# Patient Record
Sex: FEMALE | Race: WHITE | NOT HISPANIC OR LATINO | Employment: FULL TIME | URBAN - METROPOLITAN AREA
[De-identification: names, ages, dates, MRNs, and addresses within clinical notes are randomized per-mention and may not be internally consistent; named-entity substitution may affect disease eponyms.]

---

## 2023-09-24 PROBLEM — M75.102 LEFT ROTATOR CUFF TEAR: Status: ACTIVE | Noted: 2023-09-24

## 2023-09-24 PROBLEM — M54.16 CHRONIC LUMBAR RADICULOPATHY: Status: ACTIVE | Noted: 2023-09-24

## 2023-09-24 PROBLEM — M75.102 ROTATOR CUFF TEAR, LEFT: Status: ACTIVE | Noted: 2023-09-24

## 2023-09-24 PROBLEM — E78.5 HYPERLIPIDEMIA: Status: ACTIVE | Noted: 2023-09-24

## 2023-09-24 PROBLEM — L30.4 INTERTRIGO: Status: ACTIVE | Noted: 2023-09-24

## 2023-09-24 PROBLEM — M96.1 LUMBAR POSTLAMINECTOMY SYNDROME: Status: ACTIVE | Noted: 2023-09-24

## 2023-09-24 PROBLEM — E11.9 DIABETES MELLITUS (MULTI): Status: ACTIVE | Noted: 2023-09-24

## 2023-09-24 PROBLEM — E04.2 NONTOXIC MULTINODULAR GOITER: Status: ACTIVE | Noted: 2023-09-24

## 2023-09-24 RX ORDER — CARVEDILOL 6.25 MG/1
6.25 TABLET ORAL 2 TIMES DAILY
COMMUNITY
End: 2023-12-04

## 2023-09-24 RX ORDER — MELOXICAM 15 MG/1
TABLET ORAL
COMMUNITY
Start: 2021-02-04 | End: 2023-10-24 | Stop reason: WASHOUT

## 2023-09-24 RX ORDER — ROSUVASTATIN CALCIUM 20 MG/1
1 TABLET, COATED ORAL DAILY
COMMUNITY
Start: 2019-08-16 | End: 2024-05-19 | Stop reason: SDUPTHER

## 2023-09-24 RX ORDER — DOCUSATE SODIUM 100 MG/1
CAPSULE, LIQUID FILLED ORAL 2 TIMES DAILY
COMMUNITY
Start: 2021-02-04

## 2023-09-24 RX ORDER — ACETAMINOPHEN 500 MG
500 TABLET ORAL EVERY 8 HOURS
COMMUNITY
Start: 2021-02-04

## 2023-09-24 RX ORDER — NAPROXEN 500 MG/1
500 TABLET ORAL EVERY 12 HOURS
COMMUNITY
Start: 2022-04-13

## 2023-09-24 RX ORDER — NYSTATIN 100000 [USP'U]/G
POWDER TOPICAL
COMMUNITY
Start: 2022-01-26

## 2023-09-24 RX ORDER — SEMAGLUTIDE 1.34 MG/ML
INJECTION, SOLUTION SUBCUTANEOUS
COMMUNITY
End: 2023-10-24 | Stop reason: WASHOUT

## 2023-09-24 RX ORDER — ESCITALOPRAM OXALATE 20 MG/1
TABLET ORAL
COMMUNITY

## 2023-09-24 RX ORDER — BLOOD SUGAR DIAGNOSTIC
STRIP MISCELLANEOUS
COMMUNITY
Start: 2019-05-07 | End: 2023-10-24 | Stop reason: ALTCHOICE

## 2023-10-06 ENCOUNTER — TELEPHONE (OUTPATIENT)
Dept: PRIMARY CARE | Facility: CLINIC | Age: 63
End: 2023-10-06
Payer: COMMERCIAL

## 2023-10-06 DIAGNOSIS — M54.50 ACUTE LOW BACK PAIN, UNSPECIFIED BACK PAIN LATERALITY, UNSPECIFIED WHETHER SCIATICA PRESENT: Primary | ICD-10-CM

## 2023-10-06 RX ORDER — METHYLPREDNISOLONE 4 MG/1
TABLET ORAL
Qty: 21 TABLET | Refills: 0 | Status: SHIPPED | OUTPATIENT
Start: 2023-10-06 | End: 2023-10-24 | Stop reason: WASHOUT

## 2023-10-06 NOTE — TELEPHONE ENCOUNTER
Was given Dosepak last week for back was helping woke up Tuesday and it was out again.  Can she get refill on Dosepak.    [facility] [place]

## 2023-10-11 ENCOUNTER — ANCILLARY PROCEDURE (OUTPATIENT)
Dept: RADIOLOGY | Facility: HOSPITAL | Age: 63
End: 2023-10-11
Payer: COMMERCIAL

## 2023-10-11 DIAGNOSIS — E04.2 NONTOXIC MULTINODULAR GOITER: ICD-10-CM

## 2023-10-11 PROCEDURE — 76536 US EXAM OF HEAD AND NECK: CPT | Performed by: RADIOLOGY

## 2023-10-11 PROCEDURE — 76536 US EXAM OF HEAD AND NECK: CPT

## 2023-10-16 ENCOUNTER — LAB (OUTPATIENT)
Dept: LAB | Facility: LAB | Age: 63
End: 2023-10-16
Payer: COMMERCIAL

## 2023-10-16 DIAGNOSIS — E11.9 TYPE 2 DIABETES MELLITUS WITHOUT COMPLICATIONS (MULTI): Primary | ICD-10-CM

## 2023-10-16 DIAGNOSIS — E04.2 NONTOXIC MULTINODULAR GOITER: ICD-10-CM

## 2023-10-16 LAB
ALBUMIN SERPL BCP-MCNC: 3.8 G/DL (ref 3.4–5)
ALP SERPL-CCNC: 74 U/L (ref 33–136)
ALT SERPL W P-5'-P-CCNC: 18 U/L (ref 7–45)
ANION GAP SERPL CALC-SCNC: 13 MMOL/L (ref 10–20)
AST SERPL W P-5'-P-CCNC: 16 U/L (ref 9–39)
BILIRUB SERPL-MCNC: 0.6 MG/DL (ref 0–1.2)
BUN SERPL-MCNC: 17 MG/DL (ref 6–23)
CALCIUM SERPL-MCNC: 9 MG/DL (ref 8.6–10.3)
CHLORIDE SERPL-SCNC: 104 MMOL/L (ref 98–107)
CHOLEST SERPL-MCNC: 153 MG/DL (ref 0–199)
CHOLESTEROL/HDL RATIO: 4.6
CO2 SERPL-SCNC: 28 MMOL/L (ref 21–32)
CREAT SERPL-MCNC: 0.83 MG/DL (ref 0.5–1.05)
CREAT UR-MCNC: 125.9 MG/DL (ref 20–320)
ERYTHROCYTE [DISTWIDTH] IN BLOOD BY AUTOMATED COUNT: 13.9 % (ref 11.5–14.5)
EST. AVERAGE GLUCOSE BLD GHB EST-MCNC: 143 MG/DL
GFR SERPL CREATININE-BSD FRML MDRD: 79 ML/MIN/1.73M*2
GLUCOSE SERPL-MCNC: 192 MG/DL (ref 74–99)
HBA1C MFR BLD: 6.6 %
HCT VFR BLD AUTO: 44.9 % (ref 36–46)
HDLC SERPL-MCNC: 33.5 MG/DL
HGB BLD-MCNC: 13.5 G/DL (ref 12–16)
LDLC SERPL CALC-MCNC: 96 MG/DL
MCH RBC QN AUTO: 26.7 PG (ref 26–34)
MCHC RBC AUTO-ENTMCNC: 30.1 G/DL (ref 32–36)
MCV RBC AUTO: 89 FL (ref 80–100)
MICROALBUMIN UR-MCNC: <7 MG/L
MICROALBUMIN/CREAT UR: NORMAL MG/G{CREAT}
NON HDL CHOLESTEROL: 120 MG/DL (ref 0–149)
NRBC BLD-RTO: 0 /100 WBCS (ref 0–0)
PLATELET # BLD AUTO: 207 X10*3/UL (ref 150–450)
PMV BLD AUTO: 11.5 FL (ref 7.5–11.5)
POTASSIUM SERPL-SCNC: 4.7 MMOL/L (ref 3.5–5.3)
PROT SERPL-MCNC: 6.6 G/DL (ref 6.4–8.2)
RBC # BLD AUTO: 5.06 X10*6/UL (ref 4–5.2)
SODIUM SERPL-SCNC: 140 MMOL/L (ref 136–145)
TRIGL SERPL-MCNC: 118 MG/DL (ref 0–149)
TSH SERPL-ACNC: 0.99 MIU/L (ref 0.44–3.98)
VLDL: 24 MG/DL (ref 0–40)
WBC # BLD AUTO: 5.8 X10*3/UL (ref 4.4–11.3)

## 2023-10-16 PROCEDURE — 80053 COMPREHEN METABOLIC PANEL: CPT

## 2023-10-16 PROCEDURE — 80061 LIPID PANEL: CPT

## 2023-10-16 PROCEDURE — 82043 UR ALBUMIN QUANTITATIVE: CPT

## 2023-10-16 PROCEDURE — 36415 COLL VENOUS BLD VENIPUNCTURE: CPT

## 2023-10-16 PROCEDURE — 84443 ASSAY THYROID STIM HORMONE: CPT

## 2023-10-16 PROCEDURE — 83036 HEMOGLOBIN GLYCOSYLATED A1C: CPT

## 2023-10-16 PROCEDURE — 82570 ASSAY OF URINE CREATININE: CPT

## 2023-10-16 PROCEDURE — 85027 COMPLETE CBC AUTOMATED: CPT

## 2023-10-24 ENCOUNTER — OFFICE VISIT (OUTPATIENT)
Dept: ENDOCRINOLOGY | Facility: CLINIC | Age: 63
End: 2023-10-24
Payer: COMMERCIAL

## 2023-10-24 VITALS
DIASTOLIC BLOOD PRESSURE: 87 MMHG | HEART RATE: 76 BPM | SYSTOLIC BLOOD PRESSURE: 143 MMHG | WEIGHT: 258 LBS | BODY MASS INDEX: 39.23 KG/M2

## 2023-10-24 DIAGNOSIS — E11.9 TYPE 2 DIABETES MELLITUS WITHOUT COMPLICATION, WITHOUT LONG-TERM CURRENT USE OF INSULIN (MULTI): Primary | ICD-10-CM

## 2023-10-24 PROCEDURE — 99214 OFFICE O/P EST MOD 30 MIN: CPT | Performed by: INTERNAL MEDICINE

## 2023-10-24 PROCEDURE — 3079F DIAST BP 80-89 MM HG: CPT | Performed by: INTERNAL MEDICINE

## 2023-10-24 PROCEDURE — 3044F HG A1C LEVEL LT 7.0%: CPT | Performed by: INTERNAL MEDICINE

## 2023-10-24 PROCEDURE — 3062F POS MACROALBUMINURIA REV: CPT | Performed by: INTERNAL MEDICINE

## 2023-10-24 PROCEDURE — 3048F LDL-C <100 MG/DL: CPT | Performed by: INTERNAL MEDICINE

## 2023-10-24 PROCEDURE — 3077F SYST BP >= 140 MM HG: CPT | Performed by: INTERNAL MEDICINE

## 2023-10-24 RX ORDER — TIRZEPATIDE 2.5 MG/.5ML
2.5 INJECTION, SOLUTION SUBCUTANEOUS
Qty: 2 ML | Refills: 0 | Status: SHIPPED | OUTPATIENT
Start: 2023-10-24 | End: 2023-11-23

## 2023-10-24 RX ORDER — BLOOD-GLUCOSE METER
EACH MISCELLANEOUS
Qty: 100 STRIP | Refills: 3 | Status: SHIPPED | OUTPATIENT
Start: 2023-10-24 | End: 2024-04-25 | Stop reason: SDUPTHER

## 2023-10-24 RX ORDER — LANCETS 33 GAUGE
EACH MISCELLANEOUS
Qty: 100 EACH | Refills: 3 | Status: SHIPPED | OUTPATIENT
Start: 2023-10-24

## 2023-10-24 RX ORDER — DEXTROSE 4 G
TABLET,CHEWABLE ORAL
Qty: 1 EACH | Refills: 0 | Status: SHIPPED | OUTPATIENT
Start: 2023-10-24

## 2023-10-24 NOTE — PATIENT INSTRUCTIONS
RECOMMENDATIONS  Trial of Mounjaro, inject 2.5 mg once every 7 days for 4 weeks.  After 4 weeks, increase to 5 mg every 7 days.  Discussed GI side effects.  Discussed association with pancreatitis and, in rodents, with thyroid c-cell tumors.    Follow up 6 months  A1c at next appointment if not already done.

## 2023-10-24 NOTE — PROGRESS NOTES
History Of Present Illness  Brigida Phillips is a 63 y.o. female     Duration of type 2 diabetes mellitus:  5 years  Complications:  none    Back pain, recent steroid treatment    Ozempic stopped due to pruritus and retching.     Metformin discontinued due to diarrhea.  She does not recall taking Jardiance    Patient is testing glucose 1 time daily  Records reviewed, on file    Last eye exam:  2021    Multinodular goiter  Right lobe nodule FNAB 9/24/20  Bilateral nodule FNAB 11/1/22    Past Medical History  She has a past medical history of Encounter for full-term uncomplicated delivery and Personal history of other infectious and parasitic diseases.    Surgical History  She has a past surgical history that includes Knee arthroscopy w/ debridement (08/14/2014); Cataract extraction (08/14/2014); Back surgery (08/14/2014); Other surgical history (08/14/2014); Other surgical history (05/07/2019); and Other surgical history (05/07/2019).     Social History  She reports that she has quit smoking. Her smoking use included cigarettes. She does not have any smokeless tobacco history on file. No history on file for alcohol use and drug use.    Family History  Family History   Problem Relation Name Age of Onset    Diabetes Mother      Hypertension Mother      Cervical cancer Mother      Uterine cancer Mother      Kidney failure Mother      Hyperlipidemia Mother      Diabetes Father      Leukemia Father      Prostate cancer Father      Hyperlipidemia Father         Allergies  Patient has no known allergies.    Review of Systems   Constitutional:  Negative for unexpected weight change.   Gastrointestinal:  Negative for diarrhea, nausea and vomiting.   Endocrine:        As above   Musculoskeletal:  Positive for back pain.         Last Recorded Vitals  Blood pressure 143/87, pulse 76, weight 117 kg (258 lb).    Physical Exam  Neurological:      Mental Status: She is alert.          Relevant Results  Glucose (mg/dL)   Date Value  "  10/16/2023 192 (H)   10/04/2022 81   09/14/2021 110 (H)   01/28/2021 77     Hemoglobin A1C (%)   Date Value   10/16/2023 6.6 (H)   10/04/2022 5.3   09/14/2021 5.2   08/06/2020 5.5     Bicarbonate (mmol/L)   Date Value   10/16/2023 28   10/04/2022 31   09/14/2021 30   01/28/2021 32     Urea Nitrogen (mg/dL)   Date Value   10/16/2023 17   10/04/2022 21   09/14/2021 12   01/28/2021 15     Creatinine (mg/dL)   Date Value   10/16/2023 0.83   10/04/2022 0.81   09/14/2021 0.74   01/28/2021 0.82     No components found for: \"[id number]\"  Lab Results   Component Value Date    CHOL 153 10/16/2023    CHOL 135 10/04/2022    CHOL 146 09/14/2021     Lab Results   Component Value Date    HDL 33.5 10/16/2023    HDL 21.8 (A) 10/04/2022    HDL 23.8 (A) 09/14/2021     Lab Results   Component Value Date    LDLCALC 96 10/16/2023     Lab Results   Component Value Date    TRIG 118 10/16/2023    TRIG 152 (H) 10/04/2022    TRIG 184 (H) 09/14/2021     No components found for: \"CHOLHDL\"   Lab Results   Component Value Date    TSH 0.99 10/16/2023     US THYROID (10/11/23)  RIGHT LOBE:  5.9 x 1.7 x 2 cm. Nodule with internal calcifications in the mid zone  measuring 11 x 15 x 11 mm, previously 13 x 15 X 13 mm, similar in  size when compared to prior exam allowing for technical differences  in measurement variability. Additional subcentimeter nodule  incidentally noted.      LEFT LOBE:  4.9 x 1.8 x 2.1 cm. Midzone upper pole nodule measuring 14 x 9 x 14  mm, previously 19 x 9 x 16 mm, slightly decreased in size when  compared to prior exam.      ISTHMUS:  0.4 cm.      IMPRESSION:  Stable bilateral thyroid nodules. Continued follow-up is recommended  based on morphology. Correlation with interval pathology results of  the left-sided thyroid nodule is recommended.      IMPRESSION  TYPE 2 DIABETES MELLITUS    MULTINODULAR GOITER    RECOMMENDATIONS  Trial of Mounjaro, inject 2.5 mg once every 7 days for 4 weeks.  After 4 weeks, increase to 5 " mg every 7 days.  Discussed GI side effects.  Discussed association with pancreatitis and, in rodents, with thyroid c-cell tumors.    Follow up 6 months  A1c at next appointment if not already done.

## 2023-11-02 ASSESSMENT — ENCOUNTER SYMPTOMS
BACK PAIN: 1
VOMITING: 0
UNEXPECTED WEIGHT CHANGE: 0
ENDOCRINE COMMENTS: AS ABOVE
DIARRHEA: 0
NAUSEA: 0

## 2023-11-08 ENCOUNTER — OFFICE VISIT (OUTPATIENT)
Dept: ORTHOPEDIC SURGERY | Facility: CLINIC | Age: 63
End: 2023-11-08
Payer: COMMERCIAL

## 2023-11-08 ENCOUNTER — HOSPITAL ENCOUNTER (OUTPATIENT)
Dept: RADIOLOGY | Facility: HOSPITAL | Age: 63
Discharge: HOME | End: 2023-11-08
Payer: COMMERCIAL

## 2023-11-08 DIAGNOSIS — M77.11 RIGHT LATERAL EPICONDYLITIS: ICD-10-CM

## 2023-11-08 DIAGNOSIS — M25.511 RIGHT SHOULDER PAIN, UNSPECIFIED CHRONICITY: ICD-10-CM

## 2023-11-08 DIAGNOSIS — M25.811 IMPINGEMENT OF RIGHT SHOULDER: ICD-10-CM

## 2023-11-08 DIAGNOSIS — M75.21 BICEPS TENDINITIS OF RIGHT SHOULDER: ICD-10-CM

## 2023-11-08 DIAGNOSIS — M75.121 COMPLETE TEAR OF RIGHT ROTATOR CUFF, UNSPECIFIED WHETHER TRAUMATIC: Primary | ICD-10-CM

## 2023-11-08 PROCEDURE — 3048F LDL-C <100 MG/DL: CPT | Performed by: ORTHOPAEDIC SURGERY

## 2023-11-08 PROCEDURE — 73030 X-RAY EXAM OF SHOULDER: CPT | Mod: RIGHT SIDE | Performed by: RADIOLOGY

## 2023-11-08 PROCEDURE — 20610 DRAIN/INJ JOINT/BURSA W/O US: CPT | Performed by: ORTHOPAEDIC SURGERY

## 2023-11-08 PROCEDURE — 3079F DIAST BP 80-89 MM HG: CPT | Performed by: ORTHOPAEDIC SURGERY

## 2023-11-08 PROCEDURE — 3062F POS MACROALBUMINURIA REV: CPT | Performed by: ORTHOPAEDIC SURGERY

## 2023-11-08 PROCEDURE — 99214 OFFICE O/P EST MOD 30 MIN: CPT | Performed by: ORTHOPAEDIC SURGERY

## 2023-11-08 PROCEDURE — 3044F HG A1C LEVEL LT 7.0%: CPT | Performed by: ORTHOPAEDIC SURGERY

## 2023-11-08 PROCEDURE — 20605 DRAIN/INJ JOINT/BURSA W/O US: CPT | Performed by: ORTHOPAEDIC SURGERY

## 2023-11-08 PROCEDURE — 3077F SYST BP >= 140 MM HG: CPT | Performed by: ORTHOPAEDIC SURGERY

## 2023-11-08 PROCEDURE — 73030 X-RAY EXAM OF SHOULDER: CPT | Mod: RT,FY

## 2023-11-08 RX ADMIN — TRIAMCINOLONE ACETONIDE 40 MG: 40 INJECTION, SUSPENSION INTRA-ARTICULAR; INTRAMUSCULAR at 21:24

## 2023-11-08 RX ADMIN — LIDOCAINE HYDROCHLORIDE 3 ML: 10 INJECTION INFILTRATION; PERINEURAL at 21:24

## 2023-11-08 RX ADMIN — LIDOCAINE HYDROCHLORIDE 4 ML: 5 INJECTION, SOLUTION INFILTRATION; PERINEURAL at 21:24

## 2023-11-08 ASSESSMENT — PAIN - FUNCTIONAL ASSESSMENT: PAIN_FUNCTIONAL_ASSESSMENT: NO/DENIES PAIN

## 2023-11-09 NOTE — PROGRESS NOTES
Patient ID: Brigida Phillips is a 63 y.o. female.    L Inj/Asp: R subacromial bursa on 11/8/2023 9:24 PM  Indications: pain  Details: 22 G needle, posterior approach  Medications: 40 mg triamcinolone acetonide 40 mg/mL; 4 mL lidocaine 5 mg/mL (0.5 %)  Outcome: tolerated well, no immediate complications  Procedure, treatment alternatives, risks and benefits explained, specific risks discussed. Consent was given by the patient. Immediately prior to procedure a time out was called to verify the correct patient, procedure, equipment, support staff and site/side marked as required. Patient was prepped and draped in the usual sterile fashion.       M Inj/Asp: R elbow on 11/8/2023 9:24 PM  Indications: pain  Details: 22 G needle, lateral approach  Medications: 40 mg triamcinolone acetonide 10 mg/mL; 3 mL lidocaine 10 mg/mL (1 %)  Outcome: tolerated well, no immediate complications  Procedure, treatment alternatives, risks and benefits explained, specific risks discussed. Consent was given by the patient. Immediately prior to procedure a time out was called to verify the correct patient, procedure, equipment, support staff and site/side marked as required. Patient was prepped and draped in the usual sterile fashion.     63-year-old female with continued right shoulder and right elbow pain.  Patient states her right elbow continues hurting over the lateral aspect the elbow.  Steroid injections have helped in the past but the pain keeps coming back again.  Her right shoulder is also hurting worse.  She gets pain primarily over the anterior and lateral aspect of the right shoulder.  Worse with activity better with rest    Patients' self reported past medical history, medications, allergies, surgical history, family and social history as well as a 10 point review of systems has been documented in the new patient intake form and scanned into the patient's electronic medical record.  The intake form was reviewed by Dr Sanchez during  the office visit and signed by Dr. Sanchez and the patient.  Pertinent findings are documented in the HPI.    General Multi-System Physical Exam:  Constitutional  General appearance:  Alert, oriented, and in no acute distress.  Well developed, well nourished.  Head and Face  Head and face:  Normocephalic and atraumatic.  Ears, Nose, Mouth, and Throat  External inspection of ears and nose: Normal.  Eyes:  Pupils are equal and round.  Neck  Neck:  no neck mass was observed.  Pulmonary  Respiratory effort:  no respiratory distress.  Cardiovascular  Intact distal pulses.  Lymphatic  Palpation of lymph nodes in the affected extremity:  Normal.  Skin  Skin and subcutaneous tissue:  Normal skin color and pigmentation.  Normal skin turgor.  No rashes.  Neurologic  Sensation:  normal to light touch.  Psychiatric  Judgement and insight:  Intact.  Mood and affect:  Normal.  Musculoskeletal  Right shoulder is 120 degrees of abduction forward flexion 50 degrees of external rotation internal rotates to L5 she has positive biceps tenderness positive Neer positive Jacob positive Jobes with pain and weakness neurovascular tact right upper extremity  Right elbow is tender over the lateral epicondyle pain with resisted extension of the wrist neurovascular tact right upper extremity    X-rays of the patient were ordered by Dr Sanchez and obtained today.  Dr Sanchez personally reviewed the results of the x-rays.    In addition, Dr Sanchez independently interpreted the patient's x-rays (performed by the Radiology department) by viewing the x-ray images and this is Dr. Sanchez's personal interpretation:     Normal x-rays right shoulder    I explained to the patient that there are some rare risks of steroid injections.      Rare risks of steroid injections into the shoulder include pain at the site of the injection, local swelling, irritation from the injection or the skin spray, local discoloration of the skin, risk of bleeding, and a risk  of shoulder infection.  If the patient does have a flareup of pain in the evening following the injection, they should ice the shoulder 15 minutes at a time 3 times a day for up to 3 days.  If the pain gets too severe, they should go to a local Emergency Room right away.      The patient decided to proceed with the injection.  After sterilely prepping the posterior aspect of the shoulder joint with Betadyne, 5 mL of 0.25% Marcaine and 1 mL of Kenalog 40 mg were injected into the subacromial bursa from a posterior approach.  There were no complications.  A bandage was applied over the site of the injection. The patient tolerated the procedure well.        We discussed the diagnosis of tennis elbow (aka lateral epicondylitis) and the benefit of steroid injections.  We discussed the risks of these injections, including but not limited to, a risk of bleeding or infection and local skin irritation from the freeze spray, needle stick or steroid injection.  We discussed the risks of skin discoloration as well as possibility of fatty atrophy.  We also discussed the fact that this injection might not help take away the pain.  The patient understood all these and wanted to proceed with the injection.  We did explain the fact that if the patient had any history of diabetes, they should check their sugars carefully after the injection due to the fact that steroid can sometimes affect glucose levels.  We sterilely prepped the lateral aspect of the elbow with Betadyne and injected the common extensor origin on the lateral epicondyle in a peppering technique with 2 mL of 0.25% Marcaine and 1mL of Kenalog (40 mg).  The patient tolerated the procedure well.  A bandage was applied over the site of the injection.  There were no complications.      We talked about her shoulder.  I am concerned the patient may have rotator cuff tearing.  She wants another steroid injection which we provided for her today.  In regards the elbow she also  wants a steroid injection there which we provided for her.  If the patient continues having pain in the right shoulder I would then need to get an MRI to evaluate for rotator cuff tear.  She is already failed extensive conservative treatment with anti-inflammatories physical therapy and steroid injections.        This patient has had longstanding pain and weakness in their affected extremity which has gotten worse over the last few months.  Non-operative treatment has failed to help this patient and the pain is worsening.  That would classify this problem as a chronic illness with exacerbation and progression.    Due to this patient's condition, they are at a moderate risk of morbidity from additional diagnostic testing / treatment.      To help them with their pain, I wrote them a prescription for prescription strength anti-inflammatories.  The patient was informed that there are risks of using nonsteroidal antiinflammatory (NSAID) medications.    Risks of NSAIDS include, but are not limited to, upset stomach, ulcers in the stomach and other places in the gastrointestinal tract, and a mild increase in cardiovascular risk as a result of the antiinflammatory medications.  In addition, there is an increased risk in bleeding as a result of the medications.    The patient was advised to stop taking the NSAIDs if they cause them to have an upset stomach.  NSAIDs are not supposed to be taken every day for more than a few weeks.  If they have any questions or problems with the antiinflammatory medications, they should stop taking the medication immediately and call the office.

## 2023-11-14 ENCOUNTER — TELEMEDICINE (OUTPATIENT)
Dept: PRIMARY CARE | Facility: CLINIC | Age: 63
End: 2023-11-14
Payer: COMMERCIAL

## 2023-11-14 DIAGNOSIS — J06.9 UPPER RESPIRATORY TRACT INFECTION, UNSPECIFIED TYPE: Primary | ICD-10-CM

## 2023-11-14 DIAGNOSIS — R05.9 COUGH, UNSPECIFIED TYPE: ICD-10-CM

## 2023-11-14 PROCEDURE — 99213 OFFICE O/P EST LOW 20 MIN: CPT | Performed by: FAMILY MEDICINE

## 2023-11-14 RX ORDER — FLUTICASONE PROPIONATE 50 MCG
1 SPRAY, SUSPENSION (ML) NASAL DAILY
Qty: 16 G | Refills: 11 | Status: SHIPPED | OUTPATIENT
Start: 2023-11-14 | End: 2024-11-13

## 2023-11-14 RX ORDER — BENZONATATE 100 MG/1
100 CAPSULE ORAL 3 TIMES DAILY PRN
Qty: 42 CAPSULE | Refills: 0 | Status: SHIPPED | OUTPATIENT
Start: 2023-11-14 | End: 2023-12-14

## 2023-11-14 RX ORDER — DOXYCYCLINE 100 MG/1
100 CAPSULE ORAL 2 TIMES DAILY
Qty: 14 CAPSULE | Refills: 0 | Status: SHIPPED | OUTPATIENT
Start: 2023-11-14 | End: 2023-11-21

## 2023-11-14 ASSESSMENT — ENCOUNTER SYMPTOMS
HEADACHES: 1
RHINORRHEA: 0
SORE THROAT: 1
HEMOPTYSIS: 0
SWEATS: 0
FEVER: 0
COUGH: 1
SHORTNESS OF BREATH: 0
WHEEZING: 1
MYALGIAS: 0
CHILLS: 0
WEIGHT LOSS: 0
HEARTBURN: 0

## 2023-11-14 NOTE — PROGRESS NOTES
Subjective   Patient ID: Brigida Phillips is a 63 y.o. female who presents for No chief complaint on file..      Cough  This is a new problem. The current episode started 1 to 4 weeks ago. The problem has been waxing and waning. The problem occurs every few minutes. The cough is Productive of sputum. Associated symptoms include headaches, nasal congestion, postnasal drip, a sore throat and wheezing. Pertinent negatives include no chest pain, chills, ear congestion, ear pain, fever, heartburn, hemoptysis, myalgias, rash, rhinorrhea, shortness of breath, sweats or weight loss. The symptoms are aggravated by lying down.     Patient endorses a cough, chest and sinus congestion, fatigue, chills that have lasted for 2 weeks  Patient denies sore throat, fever, shortness of breath  Patient has tried Mucinex and Robitussin.  Her house has been has also been sick.    Denies new onset headaches, fever, chills, n/v/d, chest pain, SOB, abdominal pain, urinary symptoms, and lower extremity edema.     Review of Systems   Constitutional:  Negative for chills, fever and weight loss.   HENT:  Positive for postnasal drip and sore throat. Negative for ear pain and rhinorrhea.    Respiratory:  Positive for cough and wheezing. Negative for hemoptysis and shortness of breath.    Cardiovascular:  Negative for chest pain.   Gastrointestinal:  Negative for heartburn.   Musculoskeletal:  Negative for myalgias.   Skin:  Negative for rash.   Neurological:  Positive for headaches.     All other systems have been reviewed and are negative.    Visit Vitals  Smoking Status Former   No vitals taken due to virtual nature of appointment    Objective   Physical Exam -PE Limited due to virtual nature of appointment  CONSTITUTIONAL - well nourished, well developed, looks like stated age, in no acute distress, not ill-appearing, and not tired appearing  SKIN - normal skin color and pigmentation, normal skin turgor without rash, lesions, or nodules  visualized  HEAD - no trauma, normocephalic  EYES - normal external exam  CHEST -no distressed breathing, good effort  PSYCHIATRIC - alert, pleasant and cordial, age-appropriate      Assessment/Plan   Uri/cough  Tessalon Perles  Doxycycline 100 mg twice daily for 7 days  Flonase nasal spray    No red flags. Follow up in 5 days if your symptoms worsen or do not resolve.    Problem List Items Addressed This Visit    None  Visit Diagnoses       Upper respiratory tract infection, unspecified type    -  Primary    Relevant Medications    doxycycline (Vibramycin) 100 mg capsule    benzonatate (Tessalon) 100 mg capsule    fluticasone (Flonase) 50 mcg/actuation nasal spray    Cough, unspecified type        Relevant Medications    doxycycline (Vibramycin) 100 mg capsule    benzonatate (Tessalon) 100 mg capsule    fluticasone (Flonase) 50 mcg/actuation nasal spray            I have personally reviewed all available pertinent labs, imaging, and consult notes with the patient.     All questions and concerns were addressed. Patient verbalizes understanding instructions and agrees with established plan of care.     Staffed with Dr. Campbell Whitaker MD MPH  Family Medicine PGY-1  [facility]    Answers submitted by the patient for this visit:  Cough Questionnaire (Submitted on 11/14/2023)  Chief Complaint: Cough  Chronicity: new  Onset: 1 to 4 weeks ago  Progression since onset: waxing and waning  Frequency: every few minutes  Cough characteristics: productive of sputum  chest pain: No  chills: No  ear congestion: No  ear pain: No  fever: No  headaches: Yes  heartburn: No  hemoptysis: No  myalgias: No  nasal congestion: Yes  postnasal drip: Yes  rash: No  rhinorrhea: No  shortness of breath: No  sore throat: Yes  sweats: No  weight loss: No  wheezing: Yes  Aggravated by: lying down

## 2023-12-03 DIAGNOSIS — I10 HYPERTENSION, UNSPECIFIED TYPE: Primary | ICD-10-CM

## 2023-12-04 RX ORDER — CARVEDILOL 6.25 MG/1
6.25 TABLET ORAL 2 TIMES DAILY
Qty: 60 TABLET | Refills: 2 | Status: SHIPPED | OUTPATIENT
Start: 2023-12-04 | End: 2024-05-20 | Stop reason: SDUPTHER

## 2023-12-12 RX ORDER — TRIAMCINOLONE ACETONIDE 40 MG/ML
40 INJECTION, SUSPENSION INTRA-ARTICULAR; INTRAMUSCULAR
Status: COMPLETED | OUTPATIENT
Start: 2023-11-08 | End: 2023-11-08

## 2023-12-12 RX ORDER — LIDOCAINE HYDROCHLORIDE 10 MG/ML
3 INJECTION INFILTRATION; PERINEURAL
Status: COMPLETED | OUTPATIENT
Start: 2023-11-08 | End: 2023-11-08

## 2023-12-12 RX ORDER — LIDOCAINE HYDROCHLORIDE 5 MG/ML
4 INJECTION, SOLUTION INFILTRATION; PERINEURAL
Status: COMPLETED | OUTPATIENT
Start: 2023-11-08 | End: 2023-11-08

## 2024-01-10 ENCOUNTER — APPOINTMENT (OUTPATIENT)
Dept: ORTHOPEDIC SURGERY | Facility: CLINIC | Age: 64
End: 2024-01-10
Payer: COMMERCIAL

## 2024-04-25 ENCOUNTER — OFFICE VISIT (OUTPATIENT)
Dept: ENDOCRINOLOGY | Facility: CLINIC | Age: 64
End: 2024-04-25
Payer: COMMERCIAL

## 2024-04-25 VITALS
BODY MASS INDEX: 39.23 KG/M2 | HEART RATE: 75 BPM | SYSTOLIC BLOOD PRESSURE: 112 MMHG | DIASTOLIC BLOOD PRESSURE: 70 MMHG | WEIGHT: 258 LBS

## 2024-04-25 DIAGNOSIS — E11.65 TYPE 2 DIABETES MELLITUS WITH HYPERGLYCEMIA, WITHOUT LONG-TERM CURRENT USE OF INSULIN (MULTI): Primary | ICD-10-CM

## 2024-04-25 LAB — POC HEMOGLOBIN A1C: 8.8 % (ref 4.2–6.5)

## 2024-04-25 PROCEDURE — 83036 HEMOGLOBIN GLYCOSYLATED A1C: CPT | Performed by: INTERNAL MEDICINE

## 2024-04-25 PROCEDURE — 3074F SYST BP LT 130 MM HG: CPT | Performed by: INTERNAL MEDICINE

## 2024-04-25 PROCEDURE — 99214 OFFICE O/P EST MOD 30 MIN: CPT | Performed by: INTERNAL MEDICINE

## 2024-04-25 PROCEDURE — 3078F DIAST BP <80 MM HG: CPT | Performed by: INTERNAL MEDICINE

## 2024-04-25 RX ORDER — INSULIN GLARGINE 100 [IU]/ML
12 INJECTION, SOLUTION SUBCUTANEOUS NIGHTLY
Qty: 15 ML | Refills: 1 | Status: SHIPPED | OUTPATIENT
Start: 2024-04-25

## 2024-04-25 RX ORDER — PEN NEEDLE, DIABETIC 32GX 5/32"
NEEDLE, DISPOSABLE MISCELLANEOUS
Qty: 100 EACH | Refills: 1 | Status: SHIPPED | OUTPATIENT
Start: 2024-04-25

## 2024-04-25 RX ORDER — BLOOD-GLUCOSE METER
EACH MISCELLANEOUS
Qty: 300 STRIP | Refills: 3 | Status: SHIPPED | OUTPATIENT
Start: 2024-04-25

## 2024-04-25 RX ORDER — TIRZEPATIDE 2.5 MG/.5ML
2.5 INJECTION, SOLUTION SUBCUTANEOUS
Qty: 2 ML | Refills: 0 | Status: SHIPPED | OUTPATIENT
Start: 2024-04-25 | End: 2024-05-24 | Stop reason: DRUGHIGH

## 2024-04-25 ASSESSMENT — ENCOUNTER SYMPTOMS
FREQUENCY: 1
POLYDIPSIA: 0
APPETITE CHANGE: 0
NAUSEA: 0
VOMITING: 0
ABDOMINAL PAIN: 0
UNEXPECTED WEIGHT CHANGE: 1
DIARRHEA: 0
DIZZINESS: 1
HEADACHES: 0
SHORTNESS OF BREATH: 0
FEVER: 0
TROUBLE SWALLOWING: 1
CONSTIPATION: 0
COUGH: 0

## 2024-04-25 NOTE — PROGRESS NOTES
History Of Present Illness  Brigida Phillips is a 63 y.o. female     Duration of type 2 diabetes mellitus:  5 years  Complications:  none    Dizziness in the past week, veering to the left.   History of vertigo.     Excess thirst  Increased fatigue, need for sleep    COVID-19 infection 3 months ago, treated with Paxlovid.   Upper respiratory infection 2 months ago, treated with Augmentin.     Patient is testing glucose 1 times daily  Records reviewed, on file    Last eye exam:      Past Medical History  She has a past medical history of Encounter for full-term uncomplicated delivery ([facility]) and Personal history of other infectious and parasitic diseases.    Surgical History  She has a past surgical history that includes Knee arthroscopy w/ debridement (08/14/2014); Cataract extraction (08/14/2014); Back surgery (08/14/2014); Other surgical history (08/14/2014); Other surgical history (05/07/2019); and Other surgical history (05/07/2019).     Social History  She reports that she has quit smoking. Her smoking use included cigarettes. She does not have any smokeless tobacco history on file. No history on file for alcohol use and drug use.    Family History  Family History   Problem Relation Name Age of Onset    Diabetes Mother      Hypertension Mother      Cervical cancer Mother      Uterine cancer Mother      Kidney failure Mother      Hyperlipidemia Mother      Diabetes Father      Leukemia Father      Prostate cancer Father      Hyperlipidemia Father         Medications  Current Outpatient Medications   Medication Instructions    acetaminophen (TYLENOL) 500 mg, oral, Every 8 hours    blood sugar diagnostic (OneTouch Verio test strips) strip For glucose testing once daily    blood-glucose meter (OneTouch Verio Flex meter) misc For glucose testing once daily    carvedilol (COREG) 6.25 mg, oral, 2 times daily    docusate sodium (Colace) 100 mg capsule oral, 2 times daily    escitalopram (Lexapro) 20 mg tablet oral     fluticasone (Flonase) 50 mcg/actuation nasal spray 1 spray, Each Nostril, Daily, Shake gently. Before first use, prime pump. After use, clean tip and replace cap.    lancets (OneTouch Delica Plus Lancet) 33 gauge misc For glucose testing once daily    naproxen (NAPROSYN) 500 mg, oral, Every 12 hours    nystatin (Mycostatin) 100,000 unit/gram powder Apply to affected area 2-3 times a day as needed    rosuvastatin (Crestor) 20 mg tablet 1 tablet, oral, Daily       Allergies  Ozempic [semaglutide]    Review of Systems   Constitutional:  Positive for unexpected weight change (gain). Negative for appetite change and fever.   HENT:  Positive for trouble swallowing.         Dry mouth   Eyes:  Negative for visual disturbance.   Respiratory:  Negative for cough and shortness of breath.    Cardiovascular:  Negative for chest pain.   Gastrointestinal:  Negative for abdominal pain, constipation, diarrhea, nausea and vomiting.   Endocrine: Negative for polydipsia and polyuria.   Genitourinary:  Positive for frequency.   Neurological:  Positive for dizziness. Negative for headaches.         Last Recorded Vitals  Blood pressure 112/70, pulse 75, weight 117 kg (258 lb).    Physical Exam  Constitutional:       General: She is not in acute distress.  HENT:      Head: Normocephalic.      Mouth/Throat:      Mouth: Mucous membranes are moist.   Eyes:      Extraocular Movements: Extraocular movements intact.   Neck:      Thyroid: No thyromegaly.   Cardiovascular:      Pulses:           Radial pulses are 2+ on the right side and 2+ on the left side.        Dorsalis pedis pulses are 2+ on the right side and 2+ on the left side.   Musculoskeletal:      Right lower leg: No edema.      Left lower leg: No edema.   Feet:      Comments: Flat feet  Lymphadenopathy:      Cervical: No cervical adenopathy.   Skin:     Comments: No foot sores   Neurological:      Mental Status: She is alert.      Motor: No tremor.   Psychiatric:         Mood and  Affect: Affect normal.          Relevant Results  Glucose (mg/dL)   Date Value   10/16/2023 192 (H)   10/04/2022 81   09/14/2021 110 (H)   01/28/2021 77     Hemoglobin A1C (%)   Date Value   10/16/2023 6.6 (H)   10/04/2022 5.3   09/14/2021 5.2   08/06/2020 5.5     Bicarbonate (mmol/L)   Date Value   10/16/2023 28   10/04/2022 31   09/14/2021 30   01/28/2021 32     Urea Nitrogen (mg/dL)   Date Value   10/16/2023 17   10/04/2022 21   09/14/2021 12   01/28/2021 15     Creatinine (mg/dL)   Date Value   10/16/2023 0.83   10/04/2022 0.81   09/14/2021 0.74   01/28/2021 0.82     Lab Results   Component Value Date    CHOL 153 10/16/2023    CHOL 135 10/04/2022    CHOL 146 09/14/2021     Lab Results   Component Value Date    HDL 33.5 10/16/2023    HDL 21.8 (A) 10/04/2022    HDL 23.8 (A) 09/14/2021     Lab Results   Component Value Date    LDLCALC 96 10/16/2023     Lab Results   Component Value Date    TRIG 118 10/16/2023    TRIG 152 (H) 10/04/2022    TRIG 184 (H) 09/14/2021     Lab Results   Component Value Date    TSH 0.99 10/16/2023       IMPRESSION  TYPE 2 DIABETES MELLITUS WITH HYPERGLYCEMIA  Rapid A1c 8.8%  Loss of glucose control  Polyuria likely due to hyperglycemia  Dizziness is more reminiscent of vertigo, which she has had in the past  Intolerant to metformin  History of side effects on Ozempic  Mounjaro not provided despite last Rx  Prior use of insulin, stopped 5 years ago      RECOMMENDATIONS  Lantus 12 units at bedtime    Mounjaro, inject 2.5 mg once every 7 days for 4 weeks.  After 4 weeks, increase to 5 mg every 7 days.  Discussed GI side effects.  Discussed association with pancreatitis and, in rodents, with thyroid c-cell tumors.    Follow up 6-8 weeks  Bring written glucose record (2 weeks' worth) to all appointments.   Test glucose 3 times daily

## 2024-04-25 NOTE — LETTER
April 25, 2024     Jean Paul Hightower DO  [address] [place] [id number]    Patient: Brigida Phillips   YOB: 1960   Date of Visit: 4/25/2024       Dear Dr. Jean Paul Hightower DO:    Thank you for referring Brigida Phillips to me for evaluation. Below are my notes for this consultation.  If you have questions, please do not hesitate to call me. I look forward to following your patient along with you.       Sincerely,     Darwin Gutierres MD      CC: No Recipients  ______________________________________________________________________________________    History Of Present Illness  Brigida Phillips is a 63 y.o. female     Duration of type 2 diabetes mellitus:  5 years  Complications:  none    Dizziness in the past week, veering to the left.   History of vertigo.     Excess thirst  Increased fatigue, need for sleep    COVID-19 infection 3 months ago, treated with Paxlovid.   Upper respiratory infection 2 months ago, treated with Augmentin.     Patient is testing glucose 1 times daily  Records reviewed, on file    Last eye exam:      Past Medical History  She has a past medical history of Encounter for full-term uncomplicated delivery ([facility]) and Personal history of other infectious and parasitic diseases.    Surgical History  She has a past surgical history that includes Knee arthroscopy w/ debridement (08/14/2014); Cataract extraction (08/14/2014); Back surgery (08/14/2014); Other surgical history (08/14/2014); Other surgical history (05/07/2019); and Other surgical history (05/07/2019).     Social History  She reports that she has quit smoking. Her smoking use included cigarettes. She does not have any smokeless tobacco history on file. No history on file for alcohol use and drug use.    Family History  Family History   Problem Relation Name Age of Onset   • Diabetes Mother     • Hypertension Mother     • Cervical cancer Mother     • Uterine cancer Mother     • Kidney failure Mother     • Hyperlipidemia  Mother     • Diabetes Father     • Leukemia Father     • Prostate cancer Father     • Hyperlipidemia Father         Medications  Current Outpatient Medications   Medication Instructions   • acetaminophen (TYLENOL) 500 mg, oral, Every 8 hours   • blood sugar diagnostic (OneTouch Verio test strips) strip For glucose testing once daily   • blood-glucose meter (OneTouch Verio Flex meter) misc For glucose testing once daily   • carvedilol (COREG) 6.25 mg, oral, 2 times daily   • docusate sodium (Colace) 100 mg capsule oral, 2 times daily   • escitalopram (Lexapro) 20 mg tablet oral   • fluticasone (Flonase) 50 mcg/actuation nasal spray 1 spray, Each Nostril, Daily, Shake gently. Before first use, prime pump. After use, clean tip and replace cap.   • lancets (OneTouch Delica Plus Lancet) 33 gauge misc For glucose testing once daily   • naproxen (NAPROSYN) 500 mg, oral, Every 12 hours   • nystatin (Mycostatin) 100,000 unit/gram powder Apply to affected area 2-3 times a day as needed   • rosuvastatin (Crestor) 20 mg tablet 1 tablet, oral, Daily       Allergies  Ozempic [semaglutide]    Review of Systems   Constitutional:  Positive for unexpected weight change (gain). Negative for appetite change and fever.   HENT:  Positive for trouble swallowing.         Dry mouth   Eyes:  Negative for visual disturbance.   Respiratory:  Negative for cough and shortness of breath.    Cardiovascular:  Negative for chest pain.   Gastrointestinal:  Negative for abdominal pain, constipation, diarrhea, nausea and vomiting.   Endocrine: Negative for polydipsia and polyuria.   Genitourinary:  Positive for frequency.   Neurological:  Positive for dizziness. Negative for headaches.         Last Recorded Vitals  Blood pressure 112/70, pulse 75, weight 117 kg (258 lb).    Physical Exam  Constitutional:       General: She is not in acute distress.  HENT:      Head: Normocephalic.      Mouth/Throat:      Mouth: Mucous membranes are moist.   Eyes:       Extraocular Movements: Extraocular movements intact.   Neck:      Thyroid: No thyromegaly.   Cardiovascular:      Pulses:           Radial pulses are 2+ on the right side and 2+ on the left side.        Dorsalis pedis pulses are 2+ on the right side and 2+ on the left side.   Musculoskeletal:      Right lower leg: No edema.      Left lower leg: No edema.   Feet:      Comments: Flat feet  Lymphadenopathy:      Cervical: No cervical adenopathy.   Skin:     Comments: No foot sores   Neurological:      Mental Status: She is alert.      Motor: No tremor.   Psychiatric:         Mood and Affect: Affect normal.          Relevant Results  Glucose (mg/dL)   Date Value   10/16/2023 192 (H)   10/04/2022 81   09/14/2021 110 (H)   01/28/2021 77     Hemoglobin A1C (%)   Date Value   10/16/2023 6.6 (H)   10/04/2022 5.3   09/14/2021 5.2   08/06/2020 5.5     Bicarbonate (mmol/L)   Date Value   10/16/2023 28   10/04/2022 31   09/14/2021 30   01/28/2021 32     Urea Nitrogen (mg/dL)   Date Value   10/16/2023 17   10/04/2022 21   09/14/2021 12   01/28/2021 15     Creatinine (mg/dL)   Date Value   10/16/2023 0.83   10/04/2022 0.81   09/14/2021 0.74   01/28/2021 0.82     Lab Results   Component Value Date    CHOL 153 10/16/2023    CHOL 135 10/04/2022    CHOL 146 09/14/2021     Lab Results   Component Value Date    HDL 33.5 10/16/2023    HDL 21.8 (A) 10/04/2022    HDL 23.8 (A) 09/14/2021     Lab Results   Component Value Date    LDLCALC 96 10/16/2023     Lab Results   Component Value Date    TRIG 118 10/16/2023    TRIG 152 (H) 10/04/2022    TRIG 184 (H) 09/14/2021     Lab Results   Component Value Date    TSH 0.99 10/16/2023       IMPRESSION  TYPE 2 DIABETES MELLITUS WITH HYPERGLYCEMIA  Rapid A1c 8.8%  Loss of glucose control  Polyuria likely due to hyperglycemia  Dizziness is more reminiscent of vertigo, which she has had in the past  Intolerant to metformin  History of side effects on Ozempic  Mounjaro not provided despite last Rx  Prior  use of insulin, stopped 5 years ago      RECOMMENDATIONS  Lantus 12 units at bedtime    Mounjaro, inject 2.5 mg once every 7 days for 4 weeks.  After 4 weeks, increase to 5 mg every 7 days.  Discussed GI side effects.  Discussed association with pancreatitis and, in rodents, with thyroid c-cell tumors.    Follow up 6-8 weeks  Bring written glucose record (2 weeks' worth) to all appointments.   Test glucose 3 times daily

## 2024-04-25 NOTE — PATIENT INSTRUCTIONS
RECOMMENDATIONS  Lantus 12 units at bedtime    Mounjaro, inject 2.5 mg once every 7 days for 4 weeks.  After 4 weeks, increase to 5 mg every 7 days.  Discussed GI side effects.  Discussed association with pancreatitis and, in rodents, with thyroid c-cell tumors.    Follow up 6-8 weeks  Bring written glucose record (2 weeks' worth) to all appointments.   Test glucose 3 times daily

## 2024-05-01 ENCOUNTER — OFFICE VISIT (OUTPATIENT)
Dept: PULMONOLOGY | Facility: CLINIC | Age: 64
End: 2024-05-01
Payer: COMMERCIAL

## 2024-05-01 VITALS
OXYGEN SATURATION: 96 % | SYSTOLIC BLOOD PRESSURE: 111 MMHG | DIASTOLIC BLOOD PRESSURE: 72 MMHG | BODY MASS INDEX: 40.45 KG/M2 | WEIGHT: 266 LBS | RESPIRATION RATE: 16 BRPM | HEART RATE: 75 BPM

## 2024-05-01 DIAGNOSIS — R06.02 SOB (SHORTNESS OF BREATH): ICD-10-CM

## 2024-05-01 DIAGNOSIS — R06.09 DYSPNEA ON EXERTION: Primary | ICD-10-CM

## 2024-05-01 DIAGNOSIS — R40.0 SLEEPINESS: ICD-10-CM

## 2024-05-01 DIAGNOSIS — R06.83 SNORES: ICD-10-CM

## 2024-05-01 PROCEDURE — 3078F DIAST BP <80 MM HG: CPT | Performed by: INTERNAL MEDICINE

## 2024-05-01 PROCEDURE — 99204 OFFICE O/P NEW MOD 45 MIN: CPT | Performed by: INTERNAL MEDICINE

## 2024-05-01 PROCEDURE — 99214 OFFICE O/P EST MOD 30 MIN: CPT | Performed by: INTERNAL MEDICINE

## 2024-05-01 PROCEDURE — 3074F SYST BP LT 130 MM HG: CPT | Performed by: INTERNAL MEDICINE

## 2024-05-01 ASSESSMENT — PATIENT HEALTH QUESTIONNAIRE - PHQ9
2. FEELING DOWN, DEPRESSED OR HOPELESS: NOT AT ALL
1. LITTLE INTEREST OR PLEASURE IN DOING THINGS: NOT AT ALL
SUM OF ALL RESPONSES TO PHQ9 QUESTIONS 1 AND 2: 0

## 2024-05-01 ASSESSMENT — ENCOUNTER SYMPTOMS: DEPRESSION: 0

## 2024-05-01 ASSESSMENT — PAIN SCALES - GENERAL: PAINLEVEL: 0-NO PAIN

## 2024-05-01 ASSESSMENT — COLUMBIA-SUICIDE SEVERITY RATING SCALE - C-SSRS
1. IN THE PAST MONTH, HAVE YOU WISHED YOU WERE DEAD OR WISHED YOU COULD GO TO SLEEP AND NOT WAKE UP?: NO
2. HAVE YOU ACTUALLY HAD ANY THOUGHTS OF KILLING YOURSELF?: NO
6. HAVE YOU EVER DONE ANYTHING, STARTED TO DO ANYTHING, OR PREPARED TO DO ANYTHING TO END YOUR LIFE?: NO

## 2024-05-01 NOTE — PROGRESS NOTES
Department of Medicine  Division of Pulmonary, Critical Care, and Sleep Medicine  Consultation  [facility] - [address], [address]       Physician HPI (2024):   Pleasant 63-year-old woman with history of diabetes presenting for dyspnea, cough, fatigue and sleepiness.  Patient had COVID infection mid January, treated for sinus infection in February, since then with dyspnea mainly on exertion, cough, feels congested but cannot expectorate.  Also reported snoring, excessive fatigue and sleepiness during the day even though sleeping about 7 hours a night.  Denied any fever or chills, no night sweats.  All other systems reviewed and negative otherwise.    Quit smoking .    Immunization History   Administered Date(s) Administered    Flu vaccine (IIV4), preservative free *Check age/dose* 10/20/2018, 10/10/2019    Flu vaccine, quadrivalent, no egg protein, age 6 month or greater (FLUCELVAX) 10/09/2021, 2022    Moderna SARS-CoV-2 Vaccination 2021, 2021, 2021    Pneumococcal conjugate vaccine, 13-valent (PREVNAR 13) 10/10/2019    Pneumococcal polysaccharide vaccine, 23-valent, age 2 years and older (PNEUMOVAX 23) 10/20/2018    Zoster vaccine, recombinant, adult (SHINGRIX) 10/09/2021, 2021       Past Medical History:   Diagnosis Date    Encounter for full-term uncomplicated delivery ([facility])      (spontaneous vaginal delivery)    Personal history of other infectious and parasitic diseases     History of chickenpox       Past Surgical History:   Procedure Laterality Date    BACK SURGERY  2014    Back Surgery    CATARACT EXTRACTION  2014    Cataract Surgery    KNEE ARTHROSCOPY W/ DEBRIDEMENT  2014    Knee Arthroscopy (Therapeutic)    OTHER SURGICAL HISTORY  2014    Ankle Arthroscopy    OTHER SURGICAL HISTORY  2019    Eye surgery    OTHER SURGICAL HISTORY  2019    Tubal ligation       Family History   Problem Relation Name Age of Onset  "   Diabetes Mother      Hypertension Mother      Cervical cancer Mother      Uterine cancer Mother      Kidney failure Mother      Hyperlipidemia Mother      Diabetes Father      Leukemia Father      Prostate cancer Father      Hyperlipidemia Father         Social History     Tobacco Use    Smoking status: Former     Types: Cigarettes   Vaping Use    Vaping status: Never Used       Occupational & Environmental History:        Current Medications:  Current Outpatient Medications   Medication Instructions    acetaminophen (TYLENOL) 500 mg, oral, Every 8 hours    BD Prema 2nd Gen Pen Needle 32 gauge x 5/32\" needle Once daily    blood-glucose meter (OneTouch Verio Flex meter) misc For glucose testing once daily    carvedilol (COREG) 6.25 mg, oral, 2 times daily    docusate sodium (Colace) 100 mg capsule oral, 2 times daily    escitalopram (Lexapro) 20 mg tablet oral    fluticasone (Flonase) 50 mcg/actuation nasal spray 1 spray, Each Nostril, Daily, Shake gently. Before first use, prime pump. After use, clean tip and replace cap.    lancets (OneTouch Delica Plus Lancet) 33 gauge misc For glucose testing once daily    Lantus Solostar U-100 Insulin 12 Units, subcutaneous, Nightly    Mounjaro 2.5 mg, subcutaneous, Every 7 days, For 4 weeks    naproxen (NAPROSYN) 500 mg, oral, Every 12 hours    nystatin (Mycostatin) 100,000 unit/gram powder Apply to affected area 2-3 times a day as needed    OneTouch Verio test strips strip For glucose testing three times daily    rosuvastatin (Crestor) 20 mg tablet 1 tablet, oral, Daily        Drug Allergies/Intolerances:  Allergies   Allergen Reactions    Ozempic [Semaglutide] Itching        Visit Vitals  /72   Pulse 75   Resp 16   Wt 121 kg (266 lb)   SpO2 96%   BMI 40.45 kg/m²   Smoking Status Former   BSA 2.41 m²        Physical exam  Constitutional: Normal appearance.  HEENT: Normocephalic and atraumatic.  Cardiovascular: Normal rate and regular rhythm.  Pulmonary: Normal " "respiratory effort, bilateral clear breath sounds, no wheezing or rhonchi.  Musculoskeletal: No edema, no cyanosis.  Neurological: Awake, alert and oriented x3.  Psychiatric: Normal behavior, mood and affect.    Pulmonary Function Test Results     Pulmonary Functions Testing Results:    No results found for: \"FEV1\", \"FVC\", \"AXT6LFY\", \"TLC\", \"DLCO\"     Chest Radiograph     XR CHEST 1 VIEW 06/29/2020    Narrative  MRN: [id number]  Patient Name: CORETTA DOYLE    STUDY:  CHEST 1 VIEW;  6/29/2020 10:27 am    INDICATION:  Chest Pain.    COMPARISON:  None.    ACCESSION NUMBER(S):  [id number]    ORDERING CLINICIAN:  LINCOLN LEE    FINDINGS:  A single AP portable radiograph of the chest was obtained. Artifact  from overlying monitoring leads noted.    No focal infiltrate, pleural effusion or pneumothorax is identified.  The cardiac silhouette is within normal limits for size.    Impression  No acute cardiopulmonary process radiographically.      Echocardiogram     No results found for this or any previous visit from the past 365 days.       Chest CT Scan     No results found for this or any previous visit from the past 365 days.       Laboratory Studies     Lab Results   Component Value Date    WBC 5.8 10/16/2023    HGB 13.5 10/16/2023    HCT 44.9 10/16/2023    MCV 89 10/16/2023     10/16/2023      Lab Results   Component Value Date    GLUCOSE 192 (H) 10/16/2023    CALCIUM 9.0 10/16/2023     10/16/2023    K 4.7 10/16/2023    CO2 28 10/16/2023     10/16/2023    BUN 17 10/16/2023    CREATININE 0.83 10/16/2023      Lab Results   Component Value Date    ALT 18 10/16/2023    AST 16 10/16/2023    ALKPHOS 74 10/16/2023    BILITOT 0.6 10/16/2023      Protime   Date/Time Value Ref Range Status   06/29/2020 10:02 AM 11.6 10.1 - 13.3 sec Final     Comment:      Note new reference range as of 05/26/2020.     INR   Date/Time Value Ref Range Status   06/29/2020 10:02 AM 1.0 0.9 - 1.1 Final     No results found " "for: \"ICIGE\", \"IGE\", \"ICA04\", \"ASPFU\", \"IGG\", \"IGA\", \"IGM\"    Sputum Culture     No results found for: \"AFBCX\"   No results found for: \"RESPCULTCYFI\"  No results found for the last 90 days.          Assessment and Plan / Recommendations   Pleasant 63-year-old woman with history of diabetes presenting for dyspnea, cough, fatigue and sleepiness.  Patient had COVID infection mid January, treated for sinus infection in February.    Further evaluation of dyspnea and cough since COVID with chest x-ray, PFT and 6-minute walk test.  Split sleep study to evaluate for sleep apnea, reported snoring, excessive sleepiness and fatigue during the day.  [place] scale 14.  Return to clinic in 2 months or sooner with any concerns.    This dictation was created using voice recognition software. Phonetic and/or minor grammatical errors may exist.    Jorge Alberto Martinez MD  05/01/2024    "

## 2024-05-19 DIAGNOSIS — E11.65 TYPE 2 DIABETES MELLITUS WITH HYPERGLYCEMIA, WITHOUT LONG-TERM CURRENT USE OF INSULIN (MULTI): Primary | ICD-10-CM

## 2024-05-19 DIAGNOSIS — E78.5 HYPERLIPIDEMIA, UNSPECIFIED HYPERLIPIDEMIA TYPE: ICD-10-CM

## 2024-05-19 RX ORDER — ROSUVASTATIN CALCIUM 20 MG/1
20 TABLET, COATED ORAL DAILY
Qty: 90 TABLET | Refills: 3 | Status: SHIPPED | OUTPATIENT
Start: 2024-05-19 | End: 2025-05-19

## 2024-05-20 DIAGNOSIS — I10 HYPERTENSION, UNSPECIFIED TYPE: ICD-10-CM

## 2024-05-20 RX ORDER — CARVEDILOL 6.25 MG/1
6.25 TABLET ORAL 2 TIMES DAILY
Qty: 180 TABLET | Refills: 1 | Status: SHIPPED | OUTPATIENT
Start: 2024-05-20

## 2024-05-20 NOTE — TELEPHONE ENCOUNTER
Patient needs refill for Carvedilol 6.25mg takes one twice a day #180  Pharmacy [facility].  Appt is October

## 2024-05-22 ENCOUNTER — APPOINTMENT (OUTPATIENT)
Dept: PULMONOLOGY | Facility: CLINIC | Age: 64
End: 2024-05-22
Payer: COMMERCIAL

## 2024-05-24 DIAGNOSIS — E11.65 TYPE 2 DIABETES MELLITUS WITH HYPERGLYCEMIA, WITHOUT LONG-TERM CURRENT USE OF INSULIN (MULTI): Primary | ICD-10-CM

## 2024-05-24 RX ORDER — TIRZEPATIDE 5 MG/.5ML
5 INJECTION, SOLUTION SUBCUTANEOUS
Qty: 2 ML | Refills: 11 | Status: SHIPPED | OUTPATIENT
Start: 2024-05-24 | End: 2025-05-24

## 2024-06-03 ENCOUNTER — PROCEDURE VISIT (OUTPATIENT)
Dept: SLEEP MEDICINE | Facility: CLINIC | Age: 64
End: 2024-06-03
Payer: COMMERCIAL

## 2024-06-03 DIAGNOSIS — R06.83 SNORES: ICD-10-CM

## 2024-06-03 DIAGNOSIS — G47.33 OBSTRUCTIVE SLEEP APNEA (ADULT) (PEDIATRIC): ICD-10-CM

## 2024-06-03 PROCEDURE — 95811 POLYSOM 6/>YRS CPAP 4/> PARM: CPT | Performed by: INTERNAL MEDICINE

## 2024-06-04 VITALS
BODY MASS INDEX: 39.43 KG/M2 | WEIGHT: 260.14 LBS | OXYGEN SATURATION: 99 % | DIASTOLIC BLOOD PRESSURE: 75 MMHG | HEART RATE: 78 BPM | SYSTOLIC BLOOD PRESSURE: 125 MMHG | HEIGHT: 68 IN | RESPIRATION RATE: 14 BRPM

## 2024-06-04 ASSESSMENT — SLEEP AND FATIGUE QUESTIONNAIRES
HOW LIKELY ARE YOU TO NOD OFF OR FALL ASLEEP WHILE SITTING AND TALKING TO SOMEONE: WOULD NEVER DOZE
HOW LIKELY ARE YOU TO NOD OFF OR FALL ASLEEP IN A CAR, WHILE STOPPED FOR A FEW MINUTES IN TRAFFIC: WOULD NEVER DOZE
HOW LIKELY ARE YOU TO NOD OFF OR FALL ASLEEP WHEN YOU ARE A PASSENGER IN A CAR FOR AN HOUR WITHOUT A BREAK: MODERATE CHANCE OF DOZING
ESS-CHAD TOTAL SCORE: 7
HOW LIKELY ARE YOU TO NOD OFF OR FALL ASLEEP WHILE SITTING AND READING: WOULD NEVER DOZE
HOW LIKELY ARE YOU TO NOD OFF OR FALL ASLEEP WHILE WATCHING TV: MODERATE CHANCE OF DOZING
HOW LIKELY ARE YOU TO NOD OFF OR FALL ASLEEP WHILE LYING DOWN TO REST IN THE AFTERNOON WHEN CIRCUMSTANCES PERMIT: HIGH CHANCE OF DOZING
SITING INACTIVE IN A PUBLIC PLACE LIKE A CLASS ROOM OR A MOVIE THEATER: WOULD NEVER DOZE
HOW LIKELY ARE YOU TO NOD OFF OR FALL ASLEEP WHILE SITTING QUIETLY AFTER LUNCH WITHOUT ALCOHOL: WOULD NEVER DOZE

## 2024-06-04 NOTE — PROGRESS NOTES
[facility] TECH NOTE:     Patient: Brigida Phillips   MRN//AGE: [id number]  1960  64 y.o.   Technologist: Hugo Bose   Room: 4   Service Date: 2024        Sleep Testing Location: [facility]  Neck Cir 41 cm  [place]: 7    TECHNOLOGIST SLEEP STUDY PROCEDURE NOTE:   This sleep study is being conducted according to the policies and procedures outlined by the AAS accreditation standards.  The sleep study procedure and processes involved during this appointment was explained to the patient/patient’s family, questions were answered. The patient/family verbalized understanding.      The patient is a 64 y.o. year old female scheduled for a Diagnostic PSG Split night     The study that was ultimately completed was a Diagnostic PSG Split night .    The full study Was completed.  Patient questionnaires completed?: yes   Consents signed? yes      Initial Fall Risk Screening:     Brigida has not fallen in the last 6 months.  Brigida does not have a fear of falling. He does not need assistance with sitting, standing, or walking. she does not need assistance walking in her home. she does not need assistance in an unfamiliar setting. The patient is notusing an assistive device.     Brief Study observations: This is a 64 year old female here for a PSG-Split Night Study.  The patient did meet split-night criteria AHI > 15 in at least two hours of sleep prior to 0300.      Deviation to order/protocol and reason: None      If PAP, which was preferred mask/pressure/mode: Reza Beal under the nose nasal mask size medium       Other:None    After the procedure, the patient/family was informed to ensure followup with ordering clinician for testing results.      Technologist: Hugo Bose

## 2024-06-17 ENCOUNTER — APPOINTMENT (OUTPATIENT)
Dept: PULMONOLOGY | Facility: CLINIC | Age: 64
End: 2024-06-17
Payer: COMMERCIAL

## 2024-06-18 DIAGNOSIS — G47.33 OSA (OBSTRUCTIVE SLEEP APNEA): ICD-10-CM

## 2024-06-21 ENCOUNTER — HOSPITAL ENCOUNTER (OUTPATIENT)
Dept: RESPIRATORY THERAPY | Facility: HOSPITAL | Age: 64
Discharge: HOME | End: 2024-06-21
Payer: COMMERCIAL

## 2024-06-21 ENCOUNTER — HOSPITAL ENCOUNTER (OUTPATIENT)
Dept: RADIOLOGY | Facility: HOSPITAL | Age: 64
Discharge: HOME | End: 2024-06-21
Payer: COMMERCIAL

## 2024-06-21 DIAGNOSIS — R06.02 SOB (SHORTNESS OF BREATH): ICD-10-CM

## 2024-06-21 LAB
MGC ASCENT PFT - FEV1 - POST: 2.11
MGC ASCENT PFT - FEV1 - PRE: 2.15
MGC ASCENT PFT - FEV1 - PREDICTED: 2.63
MGC ASCENT PFT - FVC - POST: 2.44
MGC ASCENT PFT - FVC - PRE: 2.54
MGC ASCENT PFT - FVC - PREDICTED: 3.38

## 2024-06-21 PROCEDURE — 94726 PLETHYSMOGRAPHY LUNG VOLUMES: CPT

## 2024-06-21 PROCEDURE — 94664 DEMO&/EVAL PT USE INHALER: CPT

## 2024-06-21 PROCEDURE — 71046 X-RAY EXAM CHEST 2 VIEWS: CPT

## 2024-06-21 PROCEDURE — 71046 X-RAY EXAM CHEST 2 VIEWS: CPT | Performed by: RADIOLOGY

## 2024-06-21 PROCEDURE — 94618 PULMONARY STRESS TESTING: CPT

## 2024-06-21 PROCEDURE — 94060 EVALUATION OF WHEEZING: CPT

## 2024-06-21 PROCEDURE — 94760 N-INVAS EAR/PLS OXIMETRY 1: CPT

## 2024-06-21 PROCEDURE — 94729 DIFFUSING CAPACITY: CPT

## 2024-06-21 PROCEDURE — 98960 EDU&TRN PT SELF-MGMT NQHP 1: CPT

## 2024-06-27 ENCOUNTER — APPOINTMENT (OUTPATIENT)
Dept: ENDOCRINOLOGY | Facility: CLINIC | Age: 64
End: 2024-06-27
Payer: COMMERCIAL

## 2024-06-27 VITALS
WEIGHT: 257 LBS | HEART RATE: 73 BPM | DIASTOLIC BLOOD PRESSURE: 75 MMHG | BODY MASS INDEX: 39.08 KG/M2 | SYSTOLIC BLOOD PRESSURE: 108 MMHG

## 2024-06-27 DIAGNOSIS — E11.65 TYPE 2 DIABETES MELLITUS WITH HYPERGLYCEMIA, WITHOUT LONG-TERM CURRENT USE OF INSULIN (MULTI): Primary | ICD-10-CM

## 2024-06-27 DIAGNOSIS — E78.5 HYPERLIPIDEMIA, UNSPECIFIED HYPERLIPIDEMIA TYPE: ICD-10-CM

## 2024-06-27 PROCEDURE — 3074F SYST BP LT 130 MM HG: CPT | Performed by: INTERNAL MEDICINE

## 2024-06-27 PROCEDURE — 99213 OFFICE O/P EST LOW 20 MIN: CPT | Performed by: INTERNAL MEDICINE

## 2024-06-27 PROCEDURE — 3078F DIAST BP <80 MM HG: CPT | Performed by: INTERNAL MEDICINE

## 2024-06-27 RX ORDER — ROSUVASTATIN CALCIUM 20 MG/1
20 TABLET, COATED ORAL DAILY
Qty: 90 TABLET | Refills: 3 | Status: SHIPPED | OUTPATIENT
Start: 2024-06-27 | End: 2025-06-27

## 2024-06-27 ASSESSMENT — ENCOUNTER SYMPTOMS
VOMITING: 0
CONSTIPATION: 0
POLYDIPSIA: 0
FREQUENCY: 0
ABDOMINAL PAIN: 0
ARTHRALGIAS: 1
DIARRHEA: 0
BACK PAIN: 1
NAUSEA: 0
SHORTNESS OF BREATH: 0
SORE THROAT: 0
APPETITE CHANGE: 0
APNEA: 1
MYALGIAS: 1
NERVOUS/ANXIOUS: 0
HEADACHES: 0
COUGH: 0
FATIGUE: 1
FEVER: 0

## 2024-06-27 NOTE — PROGRESS NOTES
"History Of Present Illness  Brigida Phillips is a 64 y.o. female     Duration of type 2 diabetes mellitus:  5 years  Complications:  none    Interval diagnosis of sleep apnea, currently being fitted for CPAP.    Symptomatic hyperglycemia 2 months ago  Added  Lantus 12 units/day  Mounjaro 5 mg/week    Patient is testing glucose 1 time daily  Records reviewed, on file    Last eye exam:  over 1 year    Past Medical History  She has a past medical history of Encounter for full-term uncomplicated delivery ([facility]) and Personal history of other infectious and parasitic diseases.    Surgical History  She has a past surgical history that includes Knee arthroscopy w/ debridement (08/14/2014); Cataract extraction (08/14/2014); Back surgery (08/14/2014); Other surgical history (08/14/2014); Other surgical history (05/07/2019); and Other surgical history (05/07/2019).     Social History  She reports that she has quit smoking. Her smoking use included cigarettes. She does not have any smokeless tobacco history on file. No history on file for alcohol use and drug use.    Family History  Family History   Problem Relation Name Age of Onset    Diabetes Mother      Hypertension Mother      Cervical cancer Mother      Uterine cancer Mother      Kidney failure Mother      Hyperlipidemia Mother      Diabetes Father      Leukemia Father      Prostate cancer Father      Hyperlipidemia Father         Medications  Current Outpatient Medications   Medication Instructions    acetaminophen (TYLENOL) 500 mg, oral, Every 8 hours    BD Prema 2nd Gen Pen Needle 32 gauge x 5/32\" needle Once daily    blood-glucose meter (OneTouch Verio Flex meter) misc For glucose testing once daily    carvedilol (COREG) 6.25 mg, oral, 2 times daily    docusate sodium (Colace) 100 mg capsule oral, 2 times daily    escitalopram (Lexapro) 20 mg tablet oral    fluticasone (Flonase) 50 mcg/actuation nasal spray 1 spray, Each Nostril, Daily, Shake gently. Before first use, " prime pump. After use, clean tip and replace cap.    lancets (OneTouch Delica Plus Lancet) 33 gauge misc For glucose testing once daily    Lantus Solostar U-100 Insulin 12 Units, subcutaneous, Nightly    Mounjaro 5 mg, subcutaneous, Every 7 days    naproxen (NAPROSYN) 500 mg, oral, Every 12 hours    nystatin (Mycostatin) 100,000 unit/gram powder Apply to affected area 2-3 times a day as needed    OneTouch Verio test strips strip For glucose testing three times daily    rosuvastatin (CRESTOR) 20 mg, oral, Daily       Allergies  Ozempic [semaglutide]    Review of Systems   Constitutional:  Positive for fatigue. Negative for appetite change and fever.   HENT:  Negative for sore throat.         Denies dry mouth   Eyes:  Negative for visual disturbance.   Respiratory:  Positive for apnea (sleep). Negative for cough and shortness of breath.    Cardiovascular:  Negative for chest pain.   Gastrointestinal:  Negative for abdominal pain, constipation, diarrhea, nausea and vomiting.   Endocrine: Negative for polydipsia and polyuria.   Genitourinary:  Negative for frequency.   Musculoskeletal:  Positive for arthralgias, back pain and myalgias.   Skin:  Negative for rash.   Neurological:  Negative for headaches.   Psychiatric/Behavioral:  The patient is not nervous/anxious.          Last Recorded Vitals  Blood pressure 108/75, pulse 73, weight 117 kg (257 lb).    Physical Exam  Constitutional:       General: She is not in acute distress.  Neurological:      Mental Status: She is alert.          Relevant Results  Glucose (mg/dL)   Date Value   10/16/2023 192 (H)   10/04/2022 81   09/14/2021 110 (H)   01/28/2021 77     POC HEMOGLOBIN A1c (%)   Date Value   04/25/2024 8.8 (A)     Hemoglobin A1C (%)   Date Value   10/16/2023 6.6 (H)   10/04/2022 5.3   09/14/2021 5.2   08/06/2020 5.5     Bicarbonate (mmol/L)   Date Value   10/16/2023 28   10/04/2022 31   09/14/2021 30   01/28/2021 32     Urea Nitrogen (mg/dL)   Date Value    10/16/2023 17   10/04/2022 21   09/14/2021 12   01/28/2021 15     Creatinine (mg/dL)   Date Value   10/16/2023 0.83   10/04/2022 0.81   09/14/2021 0.74   01/28/2021 0.82     Lab Results   Component Value Date    CHOL 153 10/16/2023    CHOL 135 10/04/2022    CHOL 146 09/14/2021     Lab Results   Component Value Date    HDL 33.5 10/16/2023    HDL 21.8 (A) 10/04/2022    HDL 23.8 (A) 09/14/2021     Lab Results   Component Value Date    LDLCALC 96 10/16/2023     Lab Results   Component Value Date    TRIG 118 10/16/2023    TRIG 152 (H) 10/04/2022    TRIG 184 (H) 09/14/2021     Lab Results   Component Value Date    TSH 0.99 10/16/2023         IMPRESSION  TYPE 2 DIABETES WITH HYPERGLYCEMIA  Glucose much improved with Lantus and Mounjaro.  Diet improved    HYPERLIPIDEMIA  Chronic statin therapy      RECOMMENDATIONS  Continue Lantus 12 units at bedtime  Continue Mounjaro 5 mg every 7 days    Follow up 2-3 months  A1c at next appointment  Bring written glucose record (2 weeks' worth) to all appointments.

## 2024-06-27 NOTE — PATIENT INSTRUCTIONS
RECOMMENDATIONS  Continue Lantus 12 units at bedtime  Continue Mounjaro 5 mg every 7 days    Follow up 2-3 months  A1c at next appointment  Bring written glucose record (2 weeks' worth) to all appointments.

## 2024-06-27 NOTE — LETTER
June 27, 2024     Jean Paul Highotwer DO  [address] [place] [id number]    Patient: Brigida Phillips   YOB: 1960   Date of Visit: 6/27/2024       Dear Dr. Jean Paul Hightower DO:    Thank you for referring Brigida Phillips to me for evaluation. Below are my notes for this consultation.  If you have questions, please do not hesitate to call me. I look forward to following your patient along with you.       Sincerely,     Darwin Gutierres MD      CC: No Recipients  ______________________________________________________________________________________    History Of Present Illness  Brigida Phillips is a 64 y.o. female     Duration of type 2 diabetes mellitus:  5 years  Complications:  none    Interval diagnosis of sleep apnea, currently being fitted for CPAP.    Symptomatic hyperglycemia 2 months ago  Added  Lantus 12 units/day  Mounjaro 5 mg/week    Patient is testing glucose 1 time daily  Records reviewed, on file    Last eye exam:  over 1 year    Past Medical History  She has a past medical history of Encounter for full-term uncomplicated delivery ([facility]) and Personal history of other infectious and parasitic diseases.    Surgical History  She has a past surgical history that includes Knee arthroscopy w/ debridement (08/14/2014); Cataract extraction (08/14/2014); Back surgery (08/14/2014); Other surgical history (08/14/2014); Other surgical history (05/07/2019); and Other surgical history (05/07/2019).     Social History  She reports that she has quit smoking. Her smoking use included cigarettes. She does not have any smokeless tobacco history on file. No history on file for alcohol use and drug use.    Family History  Family History   Problem Relation Name Age of Onset   • Diabetes Mother     • Hypertension Mother     • Cervical cancer Mother     • Uterine cancer Mother     • Kidney failure Mother     • Hyperlipidemia Mother     • Diabetes Father     • Leukemia Father     • Prostate cancer Father     •  "Hyperlipidemia Father         Medications  Current Outpatient Medications   Medication Instructions   • acetaminophen (TYLENOL) 500 mg, oral, Every 8 hours   • BD Prema 2nd Gen Pen Needle 32 gauge x 5/32\" needle Once daily   • blood-glucose meter (OneTouch Verio Flex meter) misc For glucose testing once daily   • carvedilol (COREG) 6.25 mg, oral, 2 times daily   • docusate sodium (Colace) 100 mg capsule oral, 2 times daily   • escitalopram (Lexapro) 20 mg tablet oral   • fluticasone (Flonase) 50 mcg/actuation nasal spray 1 spray, Each Nostril, Daily, Shake gently. Before first use, prime pump. After use, clean tip and replace cap.   • lancets (OneTouch Delica Plus Lancet) 33 gauge misc For glucose testing once daily   • Lantus Solostar U-100 Insulin 12 Units, subcutaneous, Nightly   • Mounjaro 5 mg, subcutaneous, Every 7 days   • naproxen (NAPROSYN) 500 mg, oral, Every 12 hours   • nystatin (Mycostatin) 100,000 unit/gram powder Apply to affected area 2-3 times a day as needed   • OneTouch Verio test strips strip For glucose testing three times daily   • rosuvastatin (CRESTOR) 20 mg, oral, Daily       Allergies  Ozempic [semaglutide]    Review of Systems   Constitutional:  Positive for fatigue. Negative for appetite change and fever.   HENT:  Negative for sore throat.         Denies dry mouth   Eyes:  Negative for visual disturbance.   Respiratory:  Positive for apnea (sleep). Negative for cough and shortness of breath.    Cardiovascular:  Negative for chest pain.   Gastrointestinal:  Negative for abdominal pain, constipation, diarrhea, nausea and vomiting.   Endocrine: Negative for polydipsia and polyuria.   Genitourinary:  Negative for frequency.   Musculoskeletal:  Positive for arthralgias, back pain and myalgias.   Skin:  Negative for rash.   Neurological:  Negative for headaches.   Psychiatric/Behavioral:  The patient is not nervous/anxious.          Last Recorded Vitals  Blood pressure 108/75, pulse 73, weight " 117 kg (257 lb).    Physical Exam  Constitutional:       General: She is not in acute distress.  Neurological:      Mental Status: She is alert.          Relevant Results  Glucose (mg/dL)   Date Value   10/16/2023 192 (H)   10/04/2022 81   09/14/2021 110 (H)   01/28/2021 77     POC HEMOGLOBIN A1c (%)   Date Value   04/25/2024 8.8 (A)     Hemoglobin A1C (%)   Date Value   10/16/2023 6.6 (H)   10/04/2022 5.3   09/14/2021 5.2   08/06/2020 5.5     Bicarbonate (mmol/L)   Date Value   10/16/2023 28   10/04/2022 31   09/14/2021 30   01/28/2021 32     Urea Nitrogen (mg/dL)   Date Value   10/16/2023 17   10/04/2022 21   09/14/2021 12   01/28/2021 15     Creatinine (mg/dL)   Date Value   10/16/2023 0.83   10/04/2022 0.81   09/14/2021 0.74   01/28/2021 0.82     Lab Results   Component Value Date    CHOL 153 10/16/2023    CHOL 135 10/04/2022    CHOL 146 09/14/2021     Lab Results   Component Value Date    HDL 33.5 10/16/2023    HDL 21.8 (A) 10/04/2022    HDL 23.8 (A) 09/14/2021     Lab Results   Component Value Date    LDLCALC 96 10/16/2023     Lab Results   Component Value Date    TRIG 118 10/16/2023    TRIG 152 (H) 10/04/2022    TRIG 184 (H) 09/14/2021     Lab Results   Component Value Date    TSH 0.99 10/16/2023         IMPRESSION  TYPE 2 DIABETES WITH HYPERGLYCEMIA  Glucose much improved with Lantus and Mounjaro.  Diet improved    HYPERLIPIDEMIA  Chronic statin therapy      RECOMMENDATIONS  Continue Lantus 12 units at bedtime  Continue Mounjaro 5 mg every 7 days    Follow up 2-3 months  A1c at next appointment  Bring written glucose record (2 weeks' worth) to all appointments.

## 2024-07-22 ENCOUNTER — OFFICE VISIT (OUTPATIENT)
Dept: PULMONOLOGY | Facility: CLINIC | Age: 64
End: 2024-07-22
Payer: COMMERCIAL

## 2024-07-22 VITALS
HEART RATE: 81 BPM | BODY MASS INDEX: 39.23 KG/M2 | SYSTOLIC BLOOD PRESSURE: 119 MMHG | OXYGEN SATURATION: 98 % | DIASTOLIC BLOOD PRESSURE: 78 MMHG | RESPIRATION RATE: 16 BRPM | WEIGHT: 258 LBS

## 2024-07-22 DIAGNOSIS — J98.4 RESTRICTIVE LUNG DISEASE: ICD-10-CM

## 2024-07-22 DIAGNOSIS — G47.33 OSA ON CPAP: Primary | ICD-10-CM

## 2024-07-22 PROCEDURE — 99214 OFFICE O/P EST MOD 30 MIN: CPT | Performed by: INTERNAL MEDICINE

## 2024-07-22 PROCEDURE — 3078F DIAST BP <80 MM HG: CPT | Performed by: INTERNAL MEDICINE

## 2024-07-22 PROCEDURE — 3074F SYST BP LT 130 MM HG: CPT | Performed by: INTERNAL MEDICINE

## 2024-07-22 ASSESSMENT — PATIENT HEALTH QUESTIONNAIRE - PHQ9
SUM OF ALL RESPONSES TO PHQ9 QUESTIONS 1 AND 2: 0
1. LITTLE INTEREST OR PLEASURE IN DOING THINGS: NOT AT ALL
2. FEELING DOWN, DEPRESSED OR HOPELESS: NOT AT ALL

## 2024-07-22 ASSESSMENT — PAIN SCALES - GENERAL: PAINLEVEL: 0-NO PAIN

## 2024-07-22 ASSESSMENT — ENCOUNTER SYMPTOMS: DEPRESSION: 0

## 2024-07-22 NOTE — PROGRESS NOTES
Department of Medicine  Division of Pulmonary, Critical Care, and Sleep Medicine  Consultation  [facility] - [address], [address]       Physician HPI (2024):   Pleasant 64-year-old woman with history of diabetes presenting for dyspnea, cough, fatigue and sleepiness.   Started on CPAP July 3, reported feeling better since then but still with dyspnea on exertion and low energy.  No productive cough, no fever, no chest pain.  Using CPAP faithfully every night, no complaints.    Quit smoking .    Immunization History   Administered Date(s) Administered    Flu vaccine (IIV4), preservative free *Check age/dose* 10/20/2018, 10/10/2019    Flu vaccine, quadrivalent, no egg protein, age 6 month or greater (FLUCELVAX) 10/09/2021, 2022    Moderna SARS-CoV-2 Vaccination 2021, 2021, 2021    Pneumococcal conjugate vaccine, 13-valent (PREVNAR 13) 10/10/2019    Pneumococcal polysaccharide vaccine, 23-valent, age 2 years and older (PNEUMOVAX 23) 10/20/2018    Zoster vaccine, recombinant, adult (SHINGRIX) 10/09/2021, 2021       Past Medical History:   Diagnosis Date    Encounter for full-term uncomplicated delivery ([facility])      (spontaneous vaginal delivery)    Personal history of other infectious and parasitic diseases     History of chickenpox       Past Surgical History:   Procedure Laterality Date    BACK SURGERY  2014    Back Surgery    CATARACT EXTRACTION  2014    Cataract Surgery    KNEE ARTHROSCOPY W/ DEBRIDEMENT  2014    Knee Arthroscopy (Therapeutic)    OTHER SURGICAL HISTORY  2014    Ankle Arthroscopy    OTHER SURGICAL HISTORY  2019    Eye surgery    OTHER SURGICAL HISTORY  2019    Tubal ligation       Family History   Problem Relation Name Age of Onset    Diabetes Mother      Hypertension Mother      Cervical cancer Mother      Uterine cancer Mother      Kidney failure Mother      Hyperlipidemia Mother      Diabetes Father       "Leukemia Father      Prostate cancer Father      Hyperlipidemia Father         Social History     Tobacco Use    Smoking status: Former     Types: Cigarettes   Vaping Use    Vaping status: Never Used       Occupational & Environmental History:        Current Medications:  Current Outpatient Medications   Medication Instructions    acetaminophen (TYLENOL) 500 mg, oral, Every 8 hours    BD Prema 2nd Gen Pen Needle 32 gauge x 5/32\" needle Once daily    blood-glucose meter (OneTouch Verio Flex meter) misc For glucose testing once daily    carvedilol (COREG) 6.25 mg, oral, 2 times daily    docusate sodium (Colace) 100 mg capsule oral, 2 times daily    escitalopram (Lexapro) 20 mg tablet oral    fluticasone (Flonase) 50 mcg/actuation nasal spray 1 spray, Each Nostril, Daily, Shake gently. Before first use, prime pump. After use, clean tip and replace cap.    lancets (OneTouch Delica Plus Lancet) 33 gauge misc For glucose testing once daily    Lantus Solostar U-100 Insulin 12 Units, subcutaneous, Nightly    Mounjaro 5 mg, subcutaneous, Every 7 days    naproxen (NAPROSYN) 500 mg, oral, Every 12 hours    nystatin (Mycostatin) 100,000 unit/gram powder Apply to affected area 2-3 times a day as needed    OneTouch Verio test strips strip For glucose testing three times daily    rosuvastatin (CRESTOR) 20 mg, oral, Daily        Drug Allergies/Intolerances:  Allergies   Allergen Reactions    Ozempic [Semaglutide] Itching        Visit Vitals  /78   Pulse 81   Resp 16   Wt 117 kg (258 lb)   SpO2 98%   BMI 39.23 kg/m²   Smoking Status Former   BSA 2.37 m²        Physical exam  Constitutional: Normal appearance.  HEENT: Normocephalic and atraumatic.  Cardiovascular: Normal rate and regular rhythm.  Pulmonary: Normal respiratory effort, bilateral clear breath sounds, no wheezing or rhonchi.  Musculoskeletal: No edema, no cyanosis.  Neurological: Awake, alert and oriented x3.  Psychiatric: Normal behavior, mood and " "affect.    Pulmonary Function Test Results     Pulmonary Functions Testing Results:    No results found for: \"FEV1\", \"FVC\", \"FPB7GGQ\", \"TLC\", \"DLCO\"     Chest Radiograph     XR CHEST 1 VIEW 06/29/2020    Narrative  MRN: [id number]  Patient Name: CORETTA DOYLE    STUDY:  CHEST 1 VIEW;  6/29/2020 10:27 am    INDICATION:  Chest Pain.    COMPARISON:  None.    ACCESSION NUMBER(S):  [id number]    ORDERING CLINICIAN:  LINCOLN LEE    FINDINGS:  A single AP portable radiograph of the chest was obtained. Artifact  from overlying monitoring leads noted.    No focal infiltrate, pleural effusion or pneumothorax is identified.  The cardiac silhouette is within normal limits for size.    Impression  No acute cardiopulmonary process radiographically.      Echocardiogram     No results found for this or any previous visit from the past 365 days.       Chest CT Scan     No results found for this or any previous visit from the past 365 days.       Laboratory Studies     Lab Results   Component Value Date    WBC 5.8 10/16/2023    HGB 13.5 10/16/2023    HCT 44.9 10/16/2023    MCV 89 10/16/2023     10/16/2023      Lab Results   Component Value Date    GLUCOSE 192 (H) 10/16/2023    CALCIUM 9.0 10/16/2023     10/16/2023    K 4.7 10/16/2023    CO2 28 10/16/2023     10/16/2023    BUN 17 10/16/2023    CREATININE 0.83 10/16/2023      Lab Results   Component Value Date    ALT 18 10/16/2023    AST 16 10/16/2023    ALKPHOS 74 10/16/2023    BILITOT 0.6 10/16/2023      Protime   Date/Time Value Ref Range Status   06/29/2020 10:02 AM 11.6 10.1 - 13.3 sec Final     Comment:      Note new reference range as of 05/26/2020.     INR   Date/Time Value Ref Range Status   06/29/2020 10:02 AM 1.0 0.9 - 1.1 Final     No results found for: \"ICIGE\", \"IGE\", \"ICA04\", \"ASPFU\", \"IGG\", \"IGA\", \"IGM\"    Sputum Culture     No results found for: \"AFBCX\"   No results found for: \"RESPCULTCYFI\"  No results found for the last 90 " days.          Assessment and Plan / Recommendations   Pleasant 64-year-old woman with history of diabetes presenting for dyspnea, sleep apnea.    1.  Dyspnea-PFT with restrictive pulmonary disease.  6-minute walk test was normal.  -Further evaluation of restriction on PFT with high-resolution CT    2.  BILLIE-started on CPAP 12, using it faithfully every night.  Start using CPAP July 3, her compliance since then is 100%.  AHI 0.5.  Average use 7 hours and 11 minutes.  -continue CPAP current settings, encouraged to exercise and loose weight.      This dictation was created using voice recognition software. Phonetic and/or minor grammatical errors may exist.    Jorge Alberto Martinez MD  07/22/2024

## 2024-08-07 ENCOUNTER — APPOINTMENT (OUTPATIENT)
Dept: RADIOLOGY | Facility: HOSPITAL | Age: 64
End: 2024-08-07
Payer: COMMERCIAL

## 2024-08-15 ENCOUNTER — HOSPITAL ENCOUNTER (OUTPATIENT)
Dept: RADIOLOGY | Facility: HOSPITAL | Age: 64
Discharge: HOME | End: 2024-08-15
Payer: COMMERCIAL

## 2024-08-15 DIAGNOSIS — J98.4 RESTRICTIVE LUNG DISEASE: ICD-10-CM

## 2024-08-15 PROCEDURE — 71250 CT THORAX DX C-: CPT | Performed by: RADIOLOGY

## 2024-08-15 PROCEDURE — 71250 CT THORAX DX C-: CPT

## 2024-08-16 DIAGNOSIS — R06.09 OTHER FORM OF DYSPNEA: ICD-10-CM

## 2024-08-19 DIAGNOSIS — F32.A DEPRESSION, UNSPECIFIED DEPRESSION TYPE: Primary | ICD-10-CM

## 2024-08-19 RX ORDER — ESCITALOPRAM OXALATE 20 MG/1
20 TABLET ORAL DAILY
Qty: 90 TABLET | Refills: 1 | Status: SHIPPED | OUTPATIENT
Start: 2024-08-19 | End: 2025-02-15

## 2024-09-18 ENCOUNTER — HOSPITAL ENCOUNTER (OUTPATIENT)
Dept: CARDIOLOGY | Facility: HOSPITAL | Age: 64
Discharge: HOME | End: 2024-09-18
Payer: COMMERCIAL

## 2024-09-18 ENCOUNTER — HOSPITAL ENCOUNTER (OUTPATIENT)
Dept: RADIOLOGY | Facility: HOSPITAL | Age: 64
Discharge: HOME | End: 2024-09-18
Payer: COMMERCIAL

## 2024-09-18 ENCOUNTER — APPOINTMENT (OUTPATIENT)
Dept: ORTHOPEDIC SURGERY | Facility: CLINIC | Age: 64
End: 2024-09-18
Payer: COMMERCIAL

## 2024-09-18 DIAGNOSIS — M75.21 BICEPS TENDINITIS OF RIGHT SHOULDER: ICD-10-CM

## 2024-09-18 DIAGNOSIS — M75.121 COMPLETE TEAR OF RIGHT ROTATOR CUFF, UNSPECIFIED WHETHER TRAUMATIC: Primary | ICD-10-CM

## 2024-09-18 DIAGNOSIS — M25.511 RIGHT SHOULDER PAIN, UNSPECIFIED CHRONICITY: ICD-10-CM

## 2024-09-18 DIAGNOSIS — M75.41 IMPINGEMENT SYNDROME OF RIGHT SHOULDER: ICD-10-CM

## 2024-09-18 DIAGNOSIS — R06.09 OTHER FORM OF DYSPNEA: ICD-10-CM

## 2024-09-18 DIAGNOSIS — R06.00 DYSPNEA, UNSPECIFIED: ICD-10-CM

## 2024-09-18 DIAGNOSIS — S43.431A LABRAL TEAR OF SHOULDER, RIGHT, INITIAL ENCOUNTER: ICD-10-CM

## 2024-09-18 DIAGNOSIS — M75.101 TEAR OF RIGHT ROTATOR CUFF, UNSPECIFIED TEAR EXTENT, UNSPECIFIED WHETHER TRAUMATIC: ICD-10-CM

## 2024-09-18 LAB
AORTIC VALVE MEAN GRADIENT: 6.9 MMHG
AORTIC VALVE PEAK VELOCITY: 1.63 M/S
AV PEAK GRADIENT: 10.6 MMHG
AVA (PEAK VEL): 1.82 CM2
AVA (VTI): 1.76 CM2
EJECTION FRACTION APICAL 4 CHAMBER: 65.9
EJECTION FRACTION: 63 %
LEFT ATRIUM VOLUME AREA LENGTH INDEX BSA: 20.6 ML/M2
LEFT VENTRICLE INTERNAL DIMENSION DIASTOLE: 4.28 CM (ref 3.5–6)
LEFT VENTRICULAR OUTFLOW TRACT DIAMETER: 2.03 CM
MITRAL VALVE E/A RATIO: 1.17
RIGHT VENTRICLE FREE WALL PEAK S': 14 CM/S
RIGHT VENTRICLE PEAK SYSTOLIC PRESSURE: 19.1 MMHG
TRICUSPID ANNULAR PLANE SYSTOLIC EXCURSION: 2.9 CM

## 2024-09-18 PROCEDURE — 73030 X-RAY EXAM OF SHOULDER: CPT | Mod: RT

## 2024-09-18 PROCEDURE — 93306 TTE W/DOPPLER COMPLETE: CPT | Performed by: STUDENT IN AN ORGANIZED HEALTH CARE EDUCATION/TRAINING PROGRAM

## 2024-09-18 PROCEDURE — 93306 TTE W/DOPPLER COMPLETE: CPT

## 2024-09-18 PROCEDURE — 99214 OFFICE O/P EST MOD 30 MIN: CPT | Performed by: ORTHOPAEDIC SURGERY

## 2024-09-18 PROCEDURE — 73030 X-RAY EXAM OF SHOULDER: CPT | Mod: RIGHT SIDE | Performed by: RADIOLOGY

## 2024-09-18 RX ORDER — NAPROXEN 500 MG/1
500 TABLET ORAL 2 TIMES DAILY PRN
Qty: 60 TABLET | Refills: 0 | Status: SHIPPED | OUTPATIENT
Start: 2024-09-18 | End: 2024-10-18

## 2024-09-18 ASSESSMENT — PAIN SCALES - GENERAL: PAINLEVEL_OUTOF10: 5 - MODERATE PAIN

## 2024-09-18 ASSESSMENT — PAIN - FUNCTIONAL ASSESSMENT: PAIN_FUNCTIONAL_ASSESSMENT: 0-10

## 2024-09-19 NOTE — PROGRESS NOTES
64-year-old female with continued pain in the right shoulder.  Patient stated the steroid injection did not help at all and should be doing home exercises with no improvement with the right shoulder.  She continues having significant pain and weakness in the right shoulder    Patients' self reported past medical history, medications, allergies, surgical history, family and social history as well as a 10 point review of systems has been documented in the new patient intake form and scanned into the patient's electronic medical record.  The intake form was reviewed by Dr Sanchez during the office visit and signed by Dr. Sanchez and the patient.  Pertinent findings are documented in the HPI.    General Multi-System Physical Exam:  Constitutional  General appearance:  Alert, oriented, and in no acute distress.  Well developed, well nourished.  Head and Face  Head and face:  Normocephalic and atraumatic.  Ears, Nose, Mouth, and Throat  External inspection of ears and nose: Normal.  Eyes:  Pupils are equal and round.  Neck  Neck:  no neck mass was observed.  Pulmonary  Respiratory effort:  no respiratory distress.  Cardiovascular  Intact distal pulses.  Lymphatic  Palpation of lymph nodes in the affected extremity:  Normal.  Skin  Skin and subcutaneous tissue:  Normal skin color and pigmentation.  Normal skin turgor.  No rashes.  Neurologic  Sensation:  normal to light touch.  Psychiatric  Judgement and insight:  Intact.  Mood and affect:  Normal.  Musculoskeletal  Right shoulder is 130 degrees of abduction forward flexion 50 degrees of external rotation internal rotates to L1 positive Speed's Test positive Yergason's Test positive biceps tenderness negative acromioclavicular joint tenderness positive Neer positive Jacob positive Jobes with pain and weakness neurovasc intact right upper extremity    64-year-old female with 9 months of right shoulder pain not improved with prior injection and home exercises.  This point we  need an MRI looking for rotator cuff tear since her symptoms are consistent with a rotator cuff tear.  I will see her back after the MRI which we ordered and I gave her prescription for naproxen as well        This patient has had longstanding pain and weakness in their affected extremity which has gotten worse over the last few months.  Non-operative treatment has failed to help this patient and the pain is worsening.  That would classify this problem as a chronic illness with exacerbation and progression.    Due to this patient's condition, they are at a moderate risk of morbidity from additional diagnostic testing / treatment.      To help them with their pain, I wrote them a prescription for prescription strength anti-inflammatories.  The patient was informed that there are risks of using nonsteroidal antiinflammatory (NSAID) medications.    Risks of NSAIDS include, but are not limited to, upset stomach, ulcers in the stomach and other places in the gastrointestinal tract, and a mild increase in cardiovascular risk as a result of the antiinflammatory medications.  In addition, there is an increased risk in bleeding as a result of the medications.    The patient was advised to stop taking the NSAIDs if they cause them to have an upset stomach.  NSAIDs are not supposed to be taken every day for more than a few weeks.  If they have any questions or problems with the antiinflammatory medications, they should stop taking the medication immediately and call the office.

## 2024-09-24 ENCOUNTER — APPOINTMENT (OUTPATIENT)
Dept: ENDOCRINOLOGY | Facility: CLINIC | Age: 64
End: 2024-09-24
Payer: COMMERCIAL

## 2024-09-24 VITALS
WEIGHT: 254 LBS | HEART RATE: 73 BPM | BODY MASS INDEX: 38.62 KG/M2 | DIASTOLIC BLOOD PRESSURE: 77 MMHG | SYSTOLIC BLOOD PRESSURE: 111 MMHG

## 2024-09-24 DIAGNOSIS — Z79.4 TYPE 2 DIABETES MELLITUS WITHOUT COMPLICATION, WITH LONG-TERM CURRENT USE OF INSULIN (MULTI): ICD-10-CM

## 2024-09-24 DIAGNOSIS — E11.9 TYPE 2 DIABETES MELLITUS WITHOUT COMPLICATION, WITH LONG-TERM CURRENT USE OF INSULIN (MULTI): ICD-10-CM

## 2024-09-24 LAB — POC HEMOGLOBIN A1C: 5.4 % (ref 4.2–6.5)

## 2024-09-24 PROCEDURE — 99214 OFFICE O/P EST MOD 30 MIN: CPT | Performed by: INTERNAL MEDICINE

## 2024-09-24 PROCEDURE — 83036 HEMOGLOBIN GLYCOSYLATED A1C: CPT | Performed by: INTERNAL MEDICINE

## 2024-09-24 PROCEDURE — 3074F SYST BP LT 130 MM HG: CPT | Performed by: INTERNAL MEDICINE

## 2024-09-24 PROCEDURE — 3078F DIAST BP <80 MM HG: CPT | Performed by: INTERNAL MEDICINE

## 2024-09-24 RX ORDER — TIRZEPATIDE 7.5 MG/.5ML
7.5 INJECTION, SOLUTION SUBCUTANEOUS
Qty: 2 ML | Refills: 11 | Status: SHIPPED | OUTPATIENT
Start: 2024-09-24

## 2024-09-24 RX ORDER — LANCETS 33 GAUGE
EACH MISCELLANEOUS
Qty: 100 EACH | Refills: 3 | Status: SHIPPED | OUTPATIENT
Start: 2024-09-24

## 2024-09-24 ASSESSMENT — ENCOUNTER SYMPTOMS
NAUSEA: 0
ABDOMINAL PAIN: 0
FATIGUE: 1
COUGH: 0
HEADACHES: 0
APNEA: 1
SORE THROAT: 0
DIARRHEA: 0
NERVOUS/ANXIOUS: 0
CONSTIPATION: 0
SHORTNESS OF BREATH: 0
POLYDIPSIA: 0
FEVER: 0
VOMITING: 0
MYALGIAS: 1
APPETITE CHANGE: 0
FREQUENCY: 0
ARTHRALGIAS: 1
NUMBNESS: 1

## 2024-09-24 NOTE — LETTER
September 24, 2024     Jean Paul Hightower DO  [address] [place] [id number]    Patient: Brigida Phillips   YOB: 1960   Date of Visit: 9/24/2024       Dear Dr. Jean Paul Hightower DO:    Thank you for referring Brigida Phillips to me for evaluation. Below are my notes for this consultation.  If you have questions, please do not hesitate to call me. I look forward to following your patient along with you.       Sincerely,     Darwin Gutierres MD      CC: No Recipients  ______________________________________________________________________________________    History Of Present Illness  Brigida Phillips is a 64 y.o. female     Duration of type 2 diabetes mellitus:  5 years  Complications:  none     Lantus 12 units/day  Mounjaro 5 mg/week     Patient is testing glucose 1 time daily  Records reviewed, on file     Last eye exam:  August 2024  Capsular LASER both eyes    Past Medical History  She has a past medical history of Encounter for full-term uncomplicated delivery ([facility]) and Personal history of other infectious and parasitic diseases.    Surgical History  She has a past surgical history that includes Knee arthroscopy w/ debridement (08/14/2014); Cataract extraction (08/14/2014); Back surgery (08/14/2014); Other surgical history (08/14/2014); Other surgical history (05/07/2019); and Other surgical history (05/07/2019).     Social History  She reports that she has quit smoking. Her smoking use included cigarettes. She does not have any smokeless tobacco history on file. No history on file for alcohol use and drug use.    Family History  Family History   Problem Relation Name Age of Onset   • Diabetes Mother     • Hypertension Mother     • Cervical cancer Mother     • Uterine cancer Mother     • Kidney failure Mother     • Hyperlipidemia Mother     • Diabetes Father     • Leukemia Father     • Prostate cancer Father     • Hyperlipidemia Father         Medications  Current Outpatient Medications   Medication  "Instructions   • acetaminophen (TYLENOL) 500 mg, oral, Every 8 hours   • BD Prema 2nd Gen Pen Needle 32 gauge x 5/32\" needle Once daily   • blood-glucose meter (OneTouch Verio Flex meter) misc For glucose testing once daily   • carvedilol (COREG) 6.25 mg, oral, 2 times daily   • docusate sodium (Colace) 100 mg capsule oral, 2 times daily   • escitalopram (LEXAPRO) 20 mg, oral, Daily   • lancets (OneTouch Delica Plus Lancet) 33 gauge misc For glucose testing once daily   • Lantus Solostar U-100 Insulin 12 Units, subcutaneous, Nightly   • Mounjaro 5 mg, subcutaneous, Every 7 days   • naproxen (NAPROSYN) 500 mg, oral, Every 12 hours   • naproxen (NAPROSYN) 500 mg, oral, 2 times daily PRN   • nystatin (Mycostatin) 100,000 unit/gram powder Apply to affected area 2-3 times a day as needed   • OneTouch Verio test strips strip For glucose testing three times daily   • rosuvastatin (CRESTOR) 20 mg, oral, Daily       Allergies  Ozempic [semaglutide]    Review of Systems   Constitutional:  Positive for fatigue. Negative for appetite change and fever.   HENT:  Negative for sore throat.         Denies dry mouth   Eyes:  Negative for visual disturbance.   Respiratory:  Positive for apnea (sleep). Negative for cough and shortness of breath.    Cardiovascular:  Negative for chest pain.   Gastrointestinal:  Negative for abdominal pain, constipation, diarrhea, nausea and vomiting.   Endocrine: Negative for polydipsia and polyuria.   Genitourinary:  Negative for frequency.   Musculoskeletal:  Positive for arthralgias and myalgias.   Skin:  Negative for rash.   Neurological:  Positive for numbness. Negative for headaches.   Psychiatric/Behavioral:  The patient is not nervous/anxious.          Last Recorded Vitals  Blood pressure 111/77, pulse 73, weight 115 kg (254 lb).    Physical Exam  Constitutional:       General: She is not in acute distress.  HENT:      Head: Normocephalic.      Mouth/Throat:      Mouth: Mucous membranes are " moist.   Eyes:      Extraocular Movements: Extraocular movements intact.   Neck:      Thyroid: No thyroid mass or thyromegaly.   Cardiovascular:      Pulses:           Radial pulses are 2+ on the right side and 2+ on the left side.   Musculoskeletal:      Right lower leg: No edema.      Left lower leg: No edema.      Right foot: No deformity.      Left foot: No deformity.   Feet:      Comments: Callus formation left 1st MTP  Lymphadenopathy:      Cervical: No cervical adenopathy.   Skin:     Comments: Scarred blister base medial left ankle  No active foot sores   Neurological:      Mental Status: She is alert.      Motor: No tremor.   Psychiatric:         Mood and Affect: Affect normal.          Relevant Results  Glucose (mg/dL)   Date Value   10/16/2023 192 (H)   10/04/2022 81   09/14/2021 110 (H)   01/28/2021 77     POC HEMOGLOBIN A1c (%)   Date Value   09/24/2024 5.4   04/25/2024 8.8 (A)     Hemoglobin A1C (%)   Date Value   10/16/2023 6.6 (H)   10/04/2022 5.3   09/14/2021 5.2   08/06/2020 5.5     Bicarbonate (mmol/L)   Date Value   10/16/2023 28   10/04/2022 31   09/14/2021 30   01/28/2021 32     Urea Nitrogen (mg/dL)   Date Value   10/16/2023 17   10/04/2022 21   09/14/2021 12   01/28/2021 15     Creatinine (mg/dL)   Date Value   10/16/2023 0.83   10/04/2022 0.81   09/14/2021 0.74   01/28/2021 0.82     Lab Results   Component Value Date    CHOL 153 10/16/2023    CHOL 135 10/04/2022    CHOL 146 09/14/2021     Lab Results   Component Value Date    HDL 33.5 10/16/2023    HDL 21.8 (A) 10/04/2022    HDL 23.8 (A) 09/14/2021     Lab Results   Component Value Date    LDLCALC 96 10/16/2023     Lab Results   Component Value Date    TRIG 118 10/16/2023    TRIG 152 (H) 10/04/2022    TRIG 184 (H) 09/14/2021     Lab Results   Component Value Date    TSH 0.99 10/16/2023         IMPRESSION  TYPE 2 DIABETES MELLITUS   LONG TERM CURRENT INSULIN USE   Rapid A1c 5.4%  Marked improvement in glucose control  Tolerating  Mounjaro      RECOMMENDATIONS  Increase Mounjaro to 7.5 mg/week  Decrease Lantus to 6 units/day when you increase Mounjaro.    Follow up 3 months  Bring written glucose record (2 weeks' worth) to all appointments.

## 2024-09-24 NOTE — PROGRESS NOTES
"History Of Present Illness  Brigida Phillips is a 64 y.o. female     Duration of type 2 diabetes mellitus:  5 years  Complications:  none     Lantus 12 units/day  Mounjaro 5 mg/week     Patient is testing glucose 1 time daily  Records reviewed, on file     Last eye exam:  August 2024  Capsular LASER both eyes    Past Medical History  She has a past medical history of Encounter for full-term uncomplicated delivery ([facility]) and Personal history of other infectious and parasitic diseases.    Surgical History  She has a past surgical history that includes Knee arthroscopy w/ debridement (08/14/2014); Cataract extraction (08/14/2014); Back surgery (08/14/2014); Other surgical history (08/14/2014); Other surgical history (05/07/2019); and Other surgical history (05/07/2019).     Social History  She reports that she has quit smoking. Her smoking use included cigarettes. She does not have any smokeless tobacco history on file. No history on file for alcohol use and drug use.    Family History  Family History   Problem Relation Name Age of Onset    Diabetes Mother      Hypertension Mother      Cervical cancer Mother      Uterine cancer Mother      Kidney failure Mother      Hyperlipidemia Mother      Diabetes Father      Leukemia Father      Prostate cancer Father      Hyperlipidemia Father         Medications  Current Outpatient Medications   Medication Instructions    acetaminophen (TYLENOL) 500 mg, oral, Every 8 hours    BD Prema 2nd Gen Pen Needle 32 gauge x 5/32\" needle Once daily    blood-glucose meter (OneTouch Verio Flex meter) misc For glucose testing once daily    carvedilol (COREG) 6.25 mg, oral, 2 times daily    docusate sodium (Colace) 100 mg capsule oral, 2 times daily    escitalopram (LEXAPRO) 20 mg, oral, Daily    lancets (OneTouch Delica Plus Lancet) 33 gauge misc For glucose testing once daily    Lantus Solostar U-100 Insulin 12 Units, subcutaneous, Nightly    Mounjaro 5 mg, subcutaneous, Every 7 days    " naproxen (NAPROSYN) 500 mg, oral, Every 12 hours    naproxen (NAPROSYN) 500 mg, oral, 2 times daily PRN    nystatin (Mycostatin) 100,000 unit/gram powder Apply to affected area 2-3 times a day as needed    OneTouch Verio test strips strip For glucose testing three times daily    rosuvastatin (CRESTOR) 20 mg, oral, Daily       Allergies  Ozempic [semaglutide]    Review of Systems   Constitutional:  Positive for fatigue. Negative for appetite change and fever.   HENT:  Negative for sore throat.         Denies dry mouth   Eyes:  Negative for visual disturbance.   Respiratory:  Positive for apnea (sleep). Negative for cough and shortness of breath.    Cardiovascular:  Negative for chest pain.   Gastrointestinal:  Negative for abdominal pain, constipation, diarrhea, nausea and vomiting.   Endocrine: Negative for polydipsia and polyuria.   Genitourinary:  Negative for frequency.   Musculoskeletal:  Positive for arthralgias and myalgias.   Skin:  Negative for rash.   Neurological:  Positive for numbness. Negative for headaches.   Psychiatric/Behavioral:  The patient is not nervous/anxious.          Last Recorded Vitals  Blood pressure 111/77, pulse 73, weight 115 kg (254 lb).    Physical Exam  Constitutional:       General: She is not in acute distress.  HENT:      Head: Normocephalic.      Mouth/Throat:      Mouth: Mucous membranes are moist.   Eyes:      Extraocular Movements: Extraocular movements intact.   Neck:      Thyroid: No thyroid mass or thyromegaly.   Cardiovascular:      Pulses:           Radial pulses are 2+ on the right side and 2+ on the left side.   Musculoskeletal:      Right lower leg: No edema.      Left lower leg: No edema.      Right foot: No deformity.      Left foot: No deformity.   Feet:      Comments: Callus formation left 1st MTP  Lymphadenopathy:      Cervical: No cervical adenopathy.   Skin:     Comments: Scarred blister base medial left ankle  No active foot sores   Neurological:      Mental  Status: She is alert.      Motor: No tremor.   Psychiatric:         Mood and Affect: Affect normal.          Relevant Results  Glucose (mg/dL)   Date Value   10/16/2023 192 (H)   10/04/2022 81   09/14/2021 110 (H)   01/28/2021 77     POC HEMOGLOBIN A1c (%)   Date Value   09/24/2024 5.4   04/25/2024 8.8 (A)     Hemoglobin A1C (%)   Date Value   10/16/2023 6.6 (H)   10/04/2022 5.3   09/14/2021 5.2   08/06/2020 5.5     Bicarbonate (mmol/L)   Date Value   10/16/2023 28   10/04/2022 31   09/14/2021 30   01/28/2021 32     Urea Nitrogen (mg/dL)   Date Value   10/16/2023 17   10/04/2022 21   09/14/2021 12   01/28/2021 15     Creatinine (mg/dL)   Date Value   10/16/2023 0.83   10/04/2022 0.81   09/14/2021 0.74   01/28/2021 0.82     Lab Results   Component Value Date    CHOL 153 10/16/2023    CHOL 135 10/04/2022    CHOL 146 09/14/2021     Lab Results   Component Value Date    HDL 33.5 10/16/2023    HDL 21.8 (A) 10/04/2022    HDL 23.8 (A) 09/14/2021     Lab Results   Component Value Date    LDLCALC 96 10/16/2023     Lab Results   Component Value Date    TRIG 118 10/16/2023    TRIG 152 (H) 10/04/2022    TRIG 184 (H) 09/14/2021     Lab Results   Component Value Date    TSH 0.99 10/16/2023         IMPRESSION  TYPE 2 DIABETES MELLITUS   LONG TERM CURRENT INSULIN USE   Rapid A1c 5.4%  Marked improvement in glucose control  Tolerating Mounjaro      RECOMMENDATIONS  Increase Mounjaro to 7.5 mg/week  Decrease Lantus to 6 units/day when you increase Mounjaro.    Follow up 3 months  Bring written glucose record (2 weeks' worth) to all appointments.

## 2024-09-24 NOTE — PATIENT INSTRUCTIONS
A1c 5.4%    RECOMMENDATIONS  Increase Mounjaro to 7.5 mg/week  Decrease Lantus to 6 units/day when you increase Mounjaro.    Follow up 3 months  Bring written glucose record (2 weeks' worth) to all appointments.

## 2024-10-03 ENCOUNTER — HOSPITAL ENCOUNTER (OUTPATIENT)
Dept: RADIOLOGY | Facility: HOSPITAL | Age: 64
Discharge: HOME | End: 2024-10-03
Payer: COMMERCIAL

## 2024-10-03 DIAGNOSIS — M75.101 TEAR OF RIGHT ROTATOR CUFF, UNSPECIFIED TEAR EXTENT, UNSPECIFIED WHETHER TRAUMATIC: ICD-10-CM

## 2024-10-03 PROCEDURE — 73221 MRI JOINT UPR EXTREM W/O DYE: CPT | Mod: RT

## 2024-10-09 ENCOUNTER — APPOINTMENT (OUTPATIENT)
Dept: ORTHOPEDIC SURGERY | Facility: CLINIC | Age: 64
End: 2024-10-09
Payer: COMMERCIAL

## 2024-10-09 VITALS — HEIGHT: 68 IN | BODY MASS INDEX: 37.89 KG/M2 | WEIGHT: 250 LBS

## 2024-10-09 DIAGNOSIS — M75.41 IMPINGEMENT SYNDROME OF RIGHT SHOULDER: ICD-10-CM

## 2024-10-09 DIAGNOSIS — M75.121 COMPLETE TEAR OF RIGHT ROTATOR CUFF, UNSPECIFIED WHETHER TRAUMATIC: Primary | ICD-10-CM

## 2024-10-09 DIAGNOSIS — M25.511 ARTHRALGIA OF RIGHT ACROMIOCLAVICULAR JOINT: ICD-10-CM

## 2024-10-09 DIAGNOSIS — M75.21 BICEPS TENDINITIS OF RIGHT SHOULDER: ICD-10-CM

## 2024-10-09 DIAGNOSIS — S43.431A LABRAL TEAR OF SHOULDER, RIGHT, INITIAL ENCOUNTER: ICD-10-CM

## 2024-10-09 PROCEDURE — 99215 OFFICE O/P EST HI 40 MIN: CPT | Performed by: ORTHOPAEDIC SURGERY

## 2024-10-09 PROCEDURE — 3008F BODY MASS INDEX DOCD: CPT | Performed by: ORTHOPAEDIC SURGERY

## 2024-10-09 PROCEDURE — L3670 SO ACRO/CLAV CAN WEB PRE OTS: HCPCS | Performed by: ORTHOPAEDIC SURGERY

## 2024-10-09 ASSESSMENT — PAIN - FUNCTIONAL ASSESSMENT: PAIN_FUNCTIONAL_ASSESSMENT: 0-10

## 2024-10-09 ASSESSMENT — PAIN SCALES - GENERAL: PAINLEVEL_OUTOF10: 5 - MODERATE PAIN

## 2024-10-10 NOTE — PROGRESS NOTES
64-year-old female with continued significant pain in the right shoulder.  She states her left shoulder scope but I performed a rotator cuff repair on doing extremely well but the right shoulder still really bothering her.  The steroid injection gave her no relief and she has done a home exercise protocol with no improvements.  She has been taking anti-inflammatories no improvement.  Continues having significant pain in the right shoulder especially at night    Patients' self reported past medical history, medications, allergies, surgical history, family and social history as well as a 10 point review of systems has been documented in the new patient intake form and scanned into the patient's electronic medical record.  The intake form was reviewed by Dr Sanchez during the office visit and signed by Dr. Sanchez and the patient.  Pertinent findings are documented in the HPI.    General Multi-System Physical Exam:  Constitutional  General appearance:  Alert, oriented, and in no acute distress.  Well developed, well nourished.  Head and Face  Head and face:  Normocephalic and atraumatic.  Ears, Nose, Mouth, and Throat  External inspection of ears and nose: Normal.  Eyes:  Pupils are equal and round.  Neck  Neck:  no neck mass was observed.  Pulmonary  Respiratory effort:  no respiratory distress.  Cardiovascular  Intact distal pulses.  Lymphatic  Palpation of lymph nodes in the affected extremity:  Normal.  Skin  Skin and subcutaneous tissue:  Normal skin color and pigmentation.  Normal skin turgor.  No rashes.  Neurologic  Sensation:  normal to light touch.  Psychiatric  Judgement and insight:  Intact.  Mood and affect:  Normal.  Musculoskeletal  Right shoulder is 135 degrees of abduction forward flexion 60 degrees of external rotation internal rotates to L5 she has positive biceps tenderness with positive tenderness in the bicipital groove as well as positive speeds and positive Yergason's test.  She has positive  acromioclavicular joint tenderness with a positive cross arm test.  She has positive Neer positive Jacob positive Jobes with pain and weakness neurovasc intact right upper extremity    Dr Sanchez independently interpreted the patient's MRI (performed by the Radiology department) by viewing the MRI images and this is Dr. Sanchez's personal interpretation:     MRI images of the right shoulder show full-thickness medium retracted rotator cuff tearing of the supraspinatus and subscapularis with biceps partial tearing, labral tearing, impingement, acromioclavicular joint arthrosis    I had a long discussion the patient in regards to her right shoulder.  We talked about the failure of nonoperative treatment.  We talked about rotator cuff repair versus reverse shoulder replacement and we talked about the possible risk of rotator cuff retear with rotator cuff repairs.  After long discussion the patient decided she wanted to proceed with a right shoulder arthroscopy, arthroscopic rotator cuff repair, arthroscopic biceps tenodesis, arthroscopic distal clavicle excision, extensive debridement, subacromial decompression partial acromioplasty.  She signed appropriate surgical consents for surgery on October 24.  See her back for surgery sooner if necessary        I, Dr. Sanchez, had a long discussion with the patient / patient's family regarding the risks versus benefits of surgery and all of the treatment options, including nonoperative treatment and surgical treatment options. We discussed the risks of surgery.      The risks of surgery include, but are not limited to, nerve damage, artery damage, muscle damage, risk of bleeding or infection, risk of bone fracture, risk of post-operative stiffness, risk of failure of the surgery with possible continued pain or possible need for additional surgery.  Other risks include the risk of hardware pain and possible need for removal of the surgical hardware at another time.   The risks  of undergoing anesthesia including, but are not limited to, stroke, heart attack or death.      After a long discussion, the patient / patient's family understood all of the risks versus benefits of surgery and decided that they wanted to proceed with surgery. The patient / guardian signed appropriate surgical consent forms.    Since this patient will be undergoing surgery, I expect the patient to be in significant additional pain in the immediate postoperative period as a result of the surgical procedure. Non-opioid pain medications will not be sufficient to treat this acute, sharp, postoperative pain. Therefore we will be prescribing the patient narcotic pain medications for the immediate postoperative period in addition to numerous non-narcotic pain medications in order to try to help the patient with their post-operative pain.  However, as the pain from surgery subsides, we will work diligently to wean the patient off of all narcotics as quickly as possible.   If the patient requires more narcotic pain medications than we typically see with patients undergoing this surgery, we will refer the patient to a pain management specialist within the first few weeks after their surgical procedure. The patient's OARRS report was reviewed within the last 90 days.        This patient has had longstanding pain and weakness in their affected extremity which has gotten significantly worse over the last few months.  Non-operative treatment has failed to help this patient and the pain is severe enough to warrent surgery as the appropriate treatment at this time.  That would classify this problem as a chronic illness with severe exacerbation and progression.    We discussed their surgery at great length.  This is an elective major surgery which is warranted in this case due to the patient's failure of non-operative treatment and their significant level of pain and progression of the disease.  We discussed identified patient and  procedural risk factors and how these risk factors may increase the risk of the surgery or influence the outcome of the surgical procedure.  With this procedure, procedural risk factors include, but are not limited to, the risk of infection, bleeding, possible nerve or vasculature injury, corie fracture, and the possible risk of continued pain or weakness after the operation.  We also discussed how delaying surgery and continuing non-operative treatment may lead to a progression of the disease and high risk of further morbidity.

## 2024-10-11 ENCOUNTER — APPOINTMENT (OUTPATIENT)
Dept: CARDIOLOGY | Facility: HOSPITAL | Age: 64
End: 2024-10-11
Payer: MEDICARE

## 2024-10-11 ENCOUNTER — HOSPITAL ENCOUNTER (EMERGENCY)
Facility: HOSPITAL | Age: 64
Discharge: HOME | End: 2024-10-11
Payer: MEDICARE

## 2024-10-11 ENCOUNTER — APPOINTMENT (OUTPATIENT)
Dept: RADIOLOGY | Facility: HOSPITAL | Age: 64
End: 2024-10-11
Payer: MEDICARE

## 2024-10-11 VITALS
HEART RATE: 72 BPM | HEIGHT: 68 IN | TEMPERATURE: 97 F | SYSTOLIC BLOOD PRESSURE: 122 MMHG | RESPIRATION RATE: 16 BRPM | OXYGEN SATURATION: 100 % | DIASTOLIC BLOOD PRESSURE: 76 MMHG | WEIGHT: 250 LBS | BODY MASS INDEX: 37.89 KG/M2

## 2024-10-11 DIAGNOSIS — S20.211A HEMATOMA OF RIGHT CHEST WALL, INITIAL ENCOUNTER: ICD-10-CM
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
## 2024-10-11 DIAGNOSIS — S62.91XA CLOSED FRACTURE OF RIGHT HAND, INITIAL ENCOUNTER: ICD-10-CM

## 2024-10-11 LAB
ALBUMIN SERPL BCP-MCNC: 4.2 G/DL (ref 3.4–5)
ALP SERPL-CCNC: 75 U/L (ref 33–136)
ALT SERPL W P-5'-P-CCNC: 19 U/L (ref 7–45)
ANION GAP SERPL CALC-SCNC: 12 MMOL/L (ref 10–20)
AST SERPL W P-5'-P-CCNC: 21 U/L (ref 9–39)
BASOPHILS # BLD AUTO: 0.06 X10*3/UL (ref 0–0.1)
BASOPHILS NFR BLD AUTO: 0.8 %
BILIRUB SERPL-MCNC: 0.6 MG/DL (ref 0–1.2)
BUN SERPL-MCNC: 17 MG/DL (ref 6–23)
CALCIUM SERPL-MCNC: 9.5 MG/DL (ref 8.6–10.3)
CARDIAC TROPONIN I PNL SERPL HS: 4 NG/L (ref 0–13)
CHLORIDE SERPL-SCNC: 107 MMOL/L (ref 98–107)
CO2 SERPL-SCNC: 27 MMOL/L (ref 21–32)
CREAT SERPL-MCNC: 0.75 MG/DL (ref 0.5–1.05)
EGFRCR SERPLBLD CKD-EPI 2021: 89 ML/MIN/1.73M*2
EOSINOPHIL # BLD AUTO: 0.14 X10*3/UL (ref 0–0.7)
EOSINOPHIL NFR BLD AUTO: 1.9 %
ERYTHROCYTE [DISTWIDTH] IN BLOOD BY AUTOMATED COUNT: 14.6 % (ref 11.5–14.5)
GLUCOSE SERPL-MCNC: 134 MG/DL (ref 74–99)
HCT VFR BLD AUTO: 40.3 % (ref 36–46)
HGB BLD-MCNC: 12.9 G/DL (ref 12–16)
IMM GRANULOCYTES # BLD AUTO: 0.02 X10*3/UL (ref 0–0.7)
IMM GRANULOCYTES NFR BLD AUTO: 0.3 % (ref 0–0.9)
INR PPP: 1 (ref 0.9–1.1)
LACTATE SERPL-SCNC: 0.7 MMOL/L (ref 0.4–2)
LIPASE SERPL-CCNC: 20 U/L (ref 9–82)
LYMPHOCYTES # BLD AUTO: 0.91 X10*3/UL (ref 1.2–4.8)
LYMPHOCYTES NFR BLD AUTO: 12.3 %
MAGNESIUM SERPL-MCNC: 2.25 MG/DL (ref 1.6–2.4)
MCH RBC QN AUTO: 27.3 PG (ref 26–34)
MCHC RBC AUTO-ENTMCNC: 32 G/DL (ref 32–36)
MCV RBC AUTO: 85 FL (ref 80–100)
MONOCYTES # BLD AUTO: 0.41 X10*3/UL (ref 0.1–1)
MONOCYTES NFR BLD AUTO: 5.5 %
NEUTROPHILS # BLD AUTO: 5.86 X10*3/UL (ref 1.2–7.7)
NEUTROPHILS NFR BLD AUTO: 79.2 %
NRBC BLD-RTO: 0 /100 WBCS (ref 0–0)
PLATELET # BLD AUTO: 229 X10*3/UL (ref 150–450)
POTASSIUM SERPL-SCNC: 3.9 MMOL/L (ref 3.5–5.3)
PROT SERPL-MCNC: 7.3 G/DL (ref 6.4–8.2)
PROTHROMBIN TIME: 11.2 SECONDS (ref 9.8–12.8)
RBC # BLD AUTO: 4.72 X10*6/UL (ref 4–5.2)
SODIUM SERPL-SCNC: 142 MMOL/L (ref 136–145)
WBC # BLD AUTO: 7.4 X10*3/UL (ref 4.4–11.3)

## 2024-10-11 PROCEDURE — 72128 CT CHEST SPINE W/O DYE: CPT | Mod: RCN

## 2024-10-11 PROCEDURE — 71260 CT THORAX DX C+: CPT | Performed by: RADIOLOGY

## 2024-10-11 PROCEDURE — 83690 ASSAY OF LIPASE: CPT | Performed by: PHYSICIAN ASSISTANT

## 2024-10-11 PROCEDURE — 99285 EMERGENCY DEPT VISIT HI MDM: CPT

## 2024-10-11 PROCEDURE — 83735 ASSAY OF MAGNESIUM: CPT | Performed by: PHYSICIAN ASSISTANT

## 2024-10-11 PROCEDURE — 84484 ASSAY OF TROPONIN QUANT: CPT | Performed by: PHYSICIAN ASSISTANT

## 2024-10-11 PROCEDURE — 96376 TX/PRO/DX INJ SAME DRUG ADON: CPT

## 2024-10-11 PROCEDURE — 72131 CT LUMBAR SPINE W/O DYE: CPT | Mod: RCN

## 2024-10-11 PROCEDURE — 70450 CT HEAD/BRAIN W/O DYE: CPT

## 2024-10-11 PROCEDURE — 85025 COMPLETE CBC W/AUTO DIFF WBC: CPT | Performed by: PHYSICIAN ASSISTANT

## 2024-10-11 PROCEDURE — 72125 CT NECK SPINE W/O DYE: CPT

## 2024-10-11 PROCEDURE — 73130 X-RAY EXAM OF HAND: CPT | Mod: RT

## 2024-10-11 PROCEDURE — 72125 CT NECK SPINE W/O DYE: CPT | Performed by: RADIOLOGY

## 2024-10-11 PROCEDURE — 83605 ASSAY OF LACTIC ACID: CPT | Performed by: PHYSICIAN ASSISTANT

## 2024-10-11 PROCEDURE — 72131 CT LUMBAR SPINE W/O DYE: CPT | Mod: RCN | Performed by: RADIOLOGY

## 2024-10-11 PROCEDURE — 70450 CT HEAD/BRAIN W/O DYE: CPT | Performed by: RADIOLOGY

## 2024-10-11 PROCEDURE — 85610 PROTHROMBIN TIME: CPT | Performed by: PHYSICIAN ASSISTANT

## 2024-10-11 PROCEDURE — 74177 CT ABD & PELVIS W/CONTRAST: CPT | Performed by: RADIOLOGY

## 2024-10-11 PROCEDURE — 80053 COMPREHEN METABOLIC PANEL: CPT | Performed by: PHYSICIAN ASSISTANT

## 2024-10-11 PROCEDURE — 74177 CT ABD & PELVIS W/CONTRAST: CPT

## 2024-10-11 PROCEDURE — 2500000004 HC RX 250 GENERAL PHARMACY W/ HCPCS (ALT 636 FOR OP/ED): Performed by: PHYSICIAN ASSISTANT

## 2024-10-11 PROCEDURE — 36415 COLL VENOUS BLD VENIPUNCTURE: CPT | Performed by: PHYSICIAN ASSISTANT

## 2024-10-11 PROCEDURE — 84295 ASSAY OF SERUM SODIUM: CPT | Performed by: PHYSICIAN ASSISTANT

## 2024-10-11 PROCEDURE — 2550000001 HC RX 255 CONTRASTS: Performed by: PHYSICIAN ASSISTANT

## 2024-10-11 PROCEDURE — 96374 THER/PROPH/DIAG INJ IV PUSH: CPT

## 2024-10-11 PROCEDURE — 72128 CT CHEST SPINE W/O DYE: CPT | Mod: RCN | Performed by: RADIOLOGY

## 2024-10-11 PROCEDURE — 93005 ELECTROCARDIOGRAM TRACING: CPT

## 2024-10-11 PROCEDURE — 73130 X-RAY EXAM OF HAND: CPT | Mod: RIGHT SIDE | Performed by: RADIOLOGY

## 2024-10-11 RX ORDER — FENTANYL CITRATE 50 UG/ML
50 INJECTION, SOLUTION INTRAMUSCULAR; INTRAVENOUS ONCE
Status: COMPLETED | OUTPATIENT
Start: 2024-10-11 | End: 2024-10-11

## 2024-10-11 RX ORDER — OXYCODONE AND ACETAMINOPHEN 5; 325 MG/1; MG/1
1 TABLET ORAL EVERY 6 HOURS PRN
Qty: 12 TABLET | Refills: 0 | Status: SHIPPED | OUTPATIENT
Start: 2024-10-11 | End: 2024-10-16

## 2024-10-11 ASSESSMENT — LIFESTYLE VARIABLES
TOTAL SCORE: 0
HAVE PEOPLE ANNOYED YOU BY CRITICIZING YOUR DRINKING: NO
EVER HAD A DRINK FIRST THING IN THE MORNING TO STEADY YOUR NERVES TO GET RID OF A HANGOVER: NO
EVER FELT BAD OR GUILTY ABOUT YOUR DRINKING: NO
HAVE YOU EVER FELT YOU SHOULD CUT DOWN ON YOUR DRINKING: NO

## 2024-10-11 ASSESSMENT — COLUMBIA-SUICIDE SEVERITY RATING SCALE - C-SSRS
1. IN THE PAST MONTH, HAVE YOU WISHED YOU WERE DEAD OR WISHED YOU COULD GO TO SLEEP AND NOT WAKE UP?: NO
6. HAVE YOU EVER DONE ANYTHING, STARTED TO DO ANYTHING, OR PREPARED TO DO ANYTHING TO END YOUR LIFE?: NO
2. HAVE YOU ACTUALLY HAD ANY THOUGHTS OF KILLING YOURSELF?: NO

## 2024-10-11 ASSESSMENT — PAIN DESCRIPTION - LOCATION: LOCATION: HAND

## 2024-10-11 ASSESSMENT — PAIN DESCRIPTION - ORIENTATION: ORIENTATION: RIGHT

## 2024-10-11 ASSESSMENT — PAIN SCALES - GENERAL: PAINLEVEL_OUTOF10: 8

## 2024-10-11 ASSESSMENT — PAIN - FUNCTIONAL ASSESSMENT: PAIN_FUNCTIONAL_ASSESSMENT: 0-10
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
## 2024-10-14 ENCOUNTER — OFFICE VISIT (OUTPATIENT)
Dept: ORTHOPEDIC SURGERY | Facility: CLINIC | Age: 64
End: 2024-10-14
Payer: COMMERCIAL

## 2024-10-14 DIAGNOSIS — S62.614B DISPLACED FRACTURE OF PROXIMAL PHALANX OF RIGHT RING FINGER, INITIAL ENCOUNTER FOR OPEN FRACTURE: Primary | ICD-10-CM

## 2024-10-14 DIAGNOSIS — Z87.828 HISTORY OF MOTOR VEHICLE ACCIDENT: ICD-10-CM

## 2024-10-14 DIAGNOSIS — S62.344A CLOSED NONDISPLACED FRACTURE OF BASE OF FOURTH METACARPAL BONE OF RIGHT HAND, INITIAL ENCOUNTER: ICD-10-CM

## 2024-10-14 PROCEDURE — 99214 OFFICE O/P EST MOD 30 MIN: CPT

## 2024-10-14 ASSESSMENT — PAIN SCALES - GENERAL: PAINLEVEL_OUTOF10: 1

## 2024-10-14 ASSESSMENT — PAIN - FUNCTIONAL ASSESSMENT: PAIN_FUNCTIONAL_ASSESSMENT: 0-10
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
## 2024-10-15 ENCOUNTER — ANESTHESIA EVENT (OUTPATIENT)
Dept: OPERATING ROOM | Facility: HOSPITAL | Age: 64
End: 2024-10-15

## 2024-10-15 ENCOUNTER — APPOINTMENT (OUTPATIENT)
Dept: PRIMARY CARE | Facility: CLINIC | Age: 64
End: 2024-10-15
Payer: COMMERCIAL

## 2024-10-15 ENCOUNTER — PRE-ADMISSION TESTING (OUTPATIENT)
Dept: PREADMISSION TESTING | Facility: HOSPITAL | Age: 64
End: 2024-10-15
Payer: COMMERCIAL

## 2024-10-15 ENCOUNTER — TELEPHONE (OUTPATIENT)
Dept: ORTHOPEDIC SURGERY | Facility: CLINIC | Age: 64
End: 2024-10-15

## 2024-10-15 VITALS
TEMPERATURE: 97.3 F | DIASTOLIC BLOOD PRESSURE: 75 MMHG | BODY MASS INDEX: 38.42 KG/M2 | OXYGEN SATURATION: 98 % | HEART RATE: 73 BPM | HEIGHT: 68 IN | SYSTOLIC BLOOD PRESSURE: 105 MMHG | RESPIRATION RATE: 18 BRPM | WEIGHT: 253.53 LBS

## 2024-10-15 DIAGNOSIS — S43.431A LABRAL TEAR OF SHOULDER, RIGHT, INITIAL ENCOUNTER: ICD-10-CM

## 2024-10-15 DIAGNOSIS — M75.21 BICEPS TENDINITIS OF RIGHT SHOULDER: ICD-10-CM

## 2024-10-15 DIAGNOSIS — Z01.818 PREOPERATIVE EXAMINATION: Primary | ICD-10-CM

## 2024-10-15 DIAGNOSIS — M25.511 ARTHRALGIA OF RIGHT ACROMIOCLAVICULAR JOINT: ICD-10-CM

## 2024-10-15 DIAGNOSIS — M75.121 COMPLETE TEAR OF RIGHT ROTATOR CUFF, UNSPECIFIED WHETHER TRAUMATIC: ICD-10-CM

## 2024-10-15 DIAGNOSIS — M75.41 IMPINGEMENT SYNDROME OF RIGHT SHOULDER: ICD-10-CM

## 2024-10-15 PROCEDURE — 99204 OFFICE O/P NEW MOD 45 MIN: CPT | Performed by: NURSE PRACTITIONER

## 2024-10-15 ASSESSMENT — DUKE ACTIVITY SCORE INDEX (DASI)
TOTAL_SCORE: 50.7
CAN YOU DO LIGHT WORK AROUND THE HOUSE LIKE DUSTING OR WASHING DISHES: YES
CAN YOU WALK INDOORS, SUCH AS AROUND YOUR HOUSE: YES
CAN YOU TAKE CARE OF YOURSELF (EAT, DRESS, BATHE, OR USE TOILET): YES
CAN YOU HAVE SEXUAL RELATIONS: YES
CAN YOU PARTICIPATE IN MODERATE RECREATIONAL ACTIVITIES LIKE GOLF, BOWLING, DANCING, DOUBLES TENNIS OR THROWING A BASEBALL OR FOOTBALL: YES
CAN YOU CLIMB A FLIGHT OF STAIRS OR WALK UP A HILL: YES
CAN YOU DO HEAVY WORK AROUND THE HOUSE LIKE SCRUBBING FLOORS OR LIFTING AND MOVING HEAVY FURNITURE: YES
CAN YOU PARTICIPATE IN STRENOUS SPORTS LIKE SWIMMING, SINGLES TENNIS, FOOTBALL, BASKETBALL, OR SKIING: NO
CAN YOU DO MODERATE WORK AROUND THE HOUSE LIKE VACUUMING, SWEEPING FLOORS OR CARRYING GROCERIES: YES
CAN YOU RUN A SHORT DISTANCE: YES
CAN YOU DO YARD WORK LIKE RAKING LEAVES, WEEDING OR PUSHING A MOWER: YES
CAN YOU WALK A BLOCK OR TWO ON LEVEL GROUND: YES
DASI METS SCORE: 9

## 2024-10-15 ASSESSMENT — ENCOUNTER SYMPTOMS
EYES NEGATIVE: 1
RESPIRATORY NEGATIVE: 1
CONSTITUTIONAL NEGATIVE: 1
NEUROLOGICAL NEGATIVE: 1
NECK NEGATIVE: 1
GASTROINTESTINAL NEGATIVE: 1
MUSCULOSKELETAL NEGATIVE: 1
CARDIOVASCULAR NEGATIVE: 1

## 2024-10-15 ASSESSMENT — LIFESTYLE VARIABLES: SMOKING_STATUS: NONSMOKER

## 2024-10-15 ASSESSMENT — PAIN SCALES - GENERAL: PAINLEVEL_OUTOF10: 0 - NO PAIN

## 2024-10-15 ASSESSMENT — PAIN - FUNCTIONAL ASSESSMENT: PAIN_FUNCTIONAL_ASSESSMENT: 0-10

## 2024-10-15 NOTE — CPM/PAT H&P
CPM/PAT Evaluation       Name: Brigida Phillips (Brigida Hernandezhart)  /Age: 1960/64 y.o.     Visit Type:   In-Person       Chief Complaint: Complete tear of right rotator cuff    HPI 64 y/o female scheduled for rotator cuff shoulder repair on 10/24/2024 with  Dr. Sanchez secondary to complete tear of right rotator cuff.  PMHX includes BILLIE, T2DM .  PAT is consulted today for perioperative risk stratification and optimization.      Past Medical History:   Diagnosis Date    Body mass index (BMI) 38.0-38.9, adult 10/15/2024    Displaced fracture of proximal phalanx of right ring finger 10/11/2024    Encounter for full-term uncomplicated delivery      (spontaneous vaginal delivery)    GERD (gastroesophageal reflux disease)     History of chickenpox     Hyperlipidemia     Hypertension     BILLIE on CPAP     Type 2 diabetes mellitus        Past Surgical History:   Procedure Laterality Date    BACK SURGERY  2014    Back Surgery    CATARACT EXTRACTION  2014    Cataract Surgery    KNEE ARTHROSCOPY W/ DEBRIDEMENT  2014    Knee Arthroscopy (Therapeutic)    OTHER SURGICAL HISTORY Left 2014    Ankle Arthroscopy    OTHER SURGICAL HISTORY  2019    Eye surgery    OTHER SURGICAL HISTORY  2019    Tubal ligation    SHOULDER ARTHROSCOPY W/ ROTATOR CUFF REPAIR Left     TOTAL KNEE ARTHROPLASTY Bilateral        Patient Sexual activity questions deferred to the physician.    Family History   Problem Relation Name Age of Onset    Diabetes Mother      Hypertension Mother      Cervical cancer Mother      Uterine cancer Mother      Kidney failure Mother      Hyperlipidemia Mother      Diabetes Father      Leukemia Father      Prostate cancer Father      Hyperlipidemia Father         Allergies   Allergen Reactions    Ozempic [Semaglutide] Itching and Other     Dry heaves       Prior to Admission medications    Medication Sig Start Date End Date Taking? Authorizing Provider   acetaminophen (Tylenol) 500 mg  "tablet Take 1 tablet (500 mg) by mouth every 8 hours. 2/4/21   Historical Provider, MD   BD Prema 2nd Gen Pen Needle 32 gauge x 5/32\" needle Once daily 4/25/24   Darwin Gutierres MD   blood-glucose meter (OneTouch Verio Flex meter) misc For glucose testing once daily 10/24/23   Darwin Gutierres MD   carvedilol (Coreg) 6.25 mg tablet Take 1 tablet (6.25 mg) by mouth 2 times a day. 5/20/24   Jean Paul Hightower DO   docusate sodium (Colace) 100 mg capsule Take by mouth twice a day. 2/4/21   Historical Provider, MD   escitalopram (Lexapro) 20 mg tablet Take 1 tablet (20 mg) by mouth once daily. 8/19/24 2/15/25  Jean Paul Hightower DO   Lantus Solostar U-100 Insulin 100 unit/mL (3 mL) pen Inject 12 Units under the skin once daily at bedtime. 4/25/24   Darwin Gutierres MD   Mounjaro 7.5 mg/0.5 mL pen injector Inject 7.5 mg under the skin every 7 days. 9/24/24   Darwin Gutierres MD   naproxen (Naprosyn) 500 mg tablet Take 1 tablet (500 mg) by mouth every 12 hours. 4/13/22   Historical Provider, MD   naproxen (Naprosyn) 500 mg tablet Take 1 tablet (500 mg) by mouth 2 times a day as needed for mild pain (1 - 3). 9/18/24 10/18/24  GRACE Sanchez MD   nystatin (Mycostatin) 100,000 unit/gram powder Apply to affected area 2-3 times a day as needed 1/26/22   Historical Provider, MD   OneTouch Delica Plus Lancet 33 gauge misc For glucose testing once daily 9/24/24   MD Mary Roach Verio test strips strip For glucose testing three times daily 4/25/24   Darwin Gutierres MD   oxyCODONE-acetaminophen (Percocet) 5-325 mg tablet Take 1 tablet by mouth every 6 hours if needed for severe pain (7 - 10) for up to 3 days. 10/11/24 10/14/24  Hong Roa PA-C   rosuvastatin (Crestor) 20 mg tablet Take 1 tablet (20 mg) by mouth once daily. 6/27/24 6/27/25  Darwin Gutierres MD        [facility] ROS:   Constitutional:   neg    Neuro/Psych:   neg    Eyes:   neg    Ears:   neg    Nose:   Mouth:   Throat:   neg    Neck:   neg    Cardio:   neg "    Respiratory:   neg    Endocrine:   GI:   neg    :   neg    Musculoskeletal:    Cast on right arm  neg    Hematologic:   neg    Skin:      Physical Exam  Vitals reviewed.   Constitutional:       Appearance: Normal appearance.   HENT:      Head: Normocephalic and atraumatic.      Mouth/Throat:      Mouth: Mucous membranes are moist.      Pharynx: Oropharynx is clear.   Eyes:      Extraocular Movements: Extraocular movements intact.      Pupils: Pupils are equal, round, and reactive to light.   Cardiovascular:      Rate and Rhythm: Normal rate and regular rhythm.   Pulmonary:      Effort: Pulmonary effort is normal.      Breath sounds: Normal breath sounds.   Abdominal:      General: Abdomen is flat.      Palpations: Abdomen is soft.   Musculoskeletal:         General: Normal range of motion.      Cervical back: Normal range of motion and neck supple.   Skin:     General: Skin is warm and dry.   Neurological:      General: No focal deficit present.      Mental Status: She is alert and oriented to person, place, and time.   Psychiatric:         Mood and Affect: Mood normal.         Behavior: Behavior normal.          Airway    Visit Vitals  /75   Pulse 73   Temp 36.3 °C (97.3 °F) (Temporal)   Resp 18       DASI Risk Score      Flowsheet Row Pre-Admission Testing from 10/15/2024 in [facility]   Can you take care of yourself (eat, dress, bathe, or use toilet)?  2.75 filed at 10/15/2024 1133   Can you walk indoors, such as around your house? 1.75 filed at 10/15/2024 1133   Can you walk a block or two on level ground?  2.75 filed at 10/15/2024 1133   Can you climb a flight of stairs or walk up a hill? 5.5 filed at 10/15/2024 1133   Can you run a short distance? 8 filed at 10/15/2024 1133   Can you do light work around the house like dusting or washing dishes? 2.7 filed at 10/15/2024 1133   Can you do moderate work around the house like vacuuming, sweeping floors or carrying groceries? 3.5 filed at  10/15/2024 1133   Can you do heavy work around the house like scrubbing floors or lifting and moving heavy furniture?  8 filed at 10/15/2024 1133   Can you do yard work like raking leaves, weeding or pushing a mower? 4.5 filed at 10/15/2024 1133   Can you have sexual relations? 5.25 filed at 10/15/2024 1133   Can you participate in moderate recreational activities like golf, bowling, dancing, doubles tennis or throwing a baseball or football? 6 filed at 10/15/2024 1133   Can you participate in strenous sports like swimming, singles tennis, football, basketball, or skiing? 0 filed at 10/15/2024 1133   DASI SCORE 50.7 filed at 10/15/2024 1133   METS Score (Will be calculated only when all the questions are answered) 9 filed at 10/15/2024 1133          Caprini DVT Assessment      Flowsheet Row Pre-Admission Testing from 10/15/2024 in [facility]   DVT Score 10 filed at 10/15/2024 1153   Medical Factors Family history of DVT/PE filed at 10/15/2024 1153   Surgical Factors Major surgery planned, including arthroscopic and laproscopic (1-2 hours) filed at 10/15/2024 1153   BMI 41-50 (Morbid obesity) filed at 10/15/2024 1153          Modified Frailty Index    No data to display       CHADS2 Stroke Risk  Current as of about an hour ago        N/A 3 to 100%: High Risk   2 to < 3%: Medium Risk   0 to < 2%: Low Risk     Last Change: N/A          This score determines the patient's risk of having a stroke if the patient has atrial fibrillation.        This score is not applicable to this patient. Components are not calculated.          Revised Cardiac Risk Index    No data to display       Apfel Simplified Score      Flowsheet Row Pre-Admission Testing from 10/15/2024 in [facility]   Smoking status 1 filed at 10/15/2024 1153   History of motion sickness or PONV  0 filed at 10/15/2024 1153   Use of postoperative opioids 1 filed at 10/15/2024 1153   Gender - Female 1=Yes filed at 10/15/2024 1153   Apfel  Simplified Score Calculator 3 filed at 10/15/2024 1153          Risk Analysis Index Results This Encounter    No data found in the last 10 encounters.       Stop Bang Score      Flowsheet Row Pre-Admission Testing from 10/15/2024 in [facility]   Do you snore loudly? 1 filed at 10/15/2024 1132   Do you often feel tired or fatigued after your sleep? 0 filed at 10/15/2024 1132   Has anyone ever observed you stop breathing in your sleep? 1 filed at 10/15/2024 1132   Do you have or are you being treated for high blood pressure? 1 filed at 10/15/2024 1132   Recent BMI (Calculated) 38 filed at 10/15/2024 1132   Is BMI greater than 35 kg/m2? 1=Yes filed at 10/15/2024 1132   Age older than 50 years old? 1=Yes filed at 10/15/2024 1132   Is your neck circumference greater than 17 inches (Male) or 16 inches (Female)? 0 filed at 10/15/2024 1132   Gender - Male 0=No filed at 10/15/2024 1132   STOP-BANG Total Score 5 filed at 10/15/2024 1132                  Assessment and Plan:     HPI 64 y/o female scheduled for rotator cuff shoulder repair on 10/24/2024 with  Dr. Sanchez secondary to complete tear of right rotator cuff.  PMHX includes BILLIE, T2DM .  PAT is consulted today for perioperative risk stratification and optimization.      Neuro:  No neurologic diagnosis, however, the patient is at increased risk for perioperative delirium secondary to  polypharmacy  Patient is at increased risk for perioperative CVA secondary to  DM    HEENT:  No HEENT diagnosis or significant findings on chart review or clinical presentation and evaluation. No further preoperative testing/intervention indicated at this time.    Cardiovascular:  ECHO 9/2024:  EF 60-65%; mild AVS   METS: 9  RCRI: 0 points, 3.9%  risk for postoperative MACE   ANNE: 0.2% risk for 30 day postoperative MACE  EKG - as above    Pulmonary:  Follows with Pulmonary, Dr. Martinez.  Last seen 7/22/2024  BILLIE - Compliant   Stop Bang score is 5 placing patient at high risk  for BILLIE  ARISCAT: <26 points, 1.6% risk of in-hospital postoperative pulmonary complication  PRODIGY: Low risk for opioid induced respiratory depression  Pumonary education discussed, patient also provided deep breathing exercises with educational handout    Renal:   No renal diagnosis, however patient is at increase risk for perioperative renal complications secondary to Age equal to or greater than 56, BMI equal to or greater than 30, use of an ace, arb, or NSAID      Endocrine:  Follows with Dr. Gutierres.  Last seen 9/24/2024 for T2DM  In office A1c:  5.4 -stable  Hold Mounjaro 1 week prior to procedure    Hematologic:  No hematologic diagnosis, however patient is at an increased risk for DVT  Caprini Score 3, patient at High risk for perioperative DVT.  Patient provided with VTE education/handout.    Gastrointestinal:   No GI diagnosis or significant findings on chart review or clinical presentation and evaluation.   Apfel 3    Infectious disease:   No infectious diagnosis or significant findings on chart review or clinical presentation and evaluation.       Musculoskeletal:   No diagnosis or significant findings on chart review or clinical presentation and evaluation.     Anesthesia/Airway:  No anesthesia complications      Medication instructions and NPO guidelines reviewed with the patient.  All questions or concerns discussed and addressed.      Labs and EKG ordered

## 2024-10-15 NOTE — TELEPHONE ENCOUNTER
Spoke with Dr. Sanchez and he stated him and Dr. Waite spoke and patient is ok to proceed with surgery next week. Patient states she wants to proceed with her surgery as well. She has stopped her ozempic and will see her next week.

## 2024-10-15 NOTE — DISCHARGE INSTRUCTIONS
Dr. Waite Post-Operative Instructions  Hand/Wrist/Elbow    Activity:  Rest on the day of surgery.  Gradually resume your regular diet, beginning with clear liquids and progressing as you feel ready.  No driving if you had anesthesia.    Anesthesia:  You may feel dizzy, sleepy or lightheaded for up to 24 hours after surgery.  If you had general anesthesia you may have a sore throat for 1-2 days.  If you had a nerve block wear a sling until nerve function returns for your safety.  Nerve block may last 18-36 hours.    Post-Operative Medications:  You may resume your regular medications (including blood thinners if you stopped them).  You have been given a prescription for pain medication as needed.  You may also take over-the-counter anti-inflammatories and/or Tylenol for pain relief.  If you are taking the prescribed pain medication you must limit additional Tylenol (acetaminophen) intake to avoid overdose.    Dressings:  Keep your splint clean and dry.  You may cover with a plastic bag or cast cover and seal bag to your skin above the bandage for showering.  If your dressing becomes wet or significantly bloodstained call the office at [phone].    Post-Operative Care:  Keep the surgical site elevated above the level of your heart to limit swelling.  If your fingers are not included in the dressing you are encouraged to move your fingers regularly (full fist and full extension).  Regularly ice the surgical site.  You may also apply ice pack above the level of the dressing and this will cool the blood as it travels towards the surgical site.    Call Surgeon/Office at any time for:           Office Number: [phone]  Excessive bleeding  Loss of feeling (The local numbing medicine from surgery typically lasts 8-12 hours.  Nerve blocks can last 18-36 hours.)  Tight dressing: Make sure that you are elevating the operative site appropriately.  If no relief then call the office.                                                                                                         Circulation issues:  If fingers change to white or blue  Concerns/Problems with your surgery

## 2024-10-15 NOTE — PREPROCEDURE INSTRUCTIONS
Thank you for visiting Preadmission Testing (PAT) today for your pre-procedure evaluation, you were seen by     Lorelei Alejo CNP  Pre Admission Testing  [facility]  [phone]    This summary includes instructions and information to aid you during your perioperative period.  Please read carefully. If you have any questions about your visit today, please call the number listed above.  If you become ill or have any changes to your health before your surgery, please contact your primary care provider and alert your surgeon.      Preparing for your Surgery       Exercises  Preoperative Deep Breathing Exercises  Why it is important to do deep breathing exercises before my surgery?  Deep breathing exercises strengthen your breathing muscles.  This helps you to recover after your surgery and decreases the chance of breathing complications.  How are the deep breathing exercises done?  Sit straight with your back supported.  Breathe in deeply and slowly through your nose. Your lower rib cage should expand and your abdomen may move forward.  Hold that breath for 3 to 5 seconds.  Breathe out through pursed lips, slowly and completely.  Rest and repeat 10 times every hour while awake.  Rest longer if you become dizzy or lightheaded.       Preoperative Brain Exercises    What are brain exercises?  A brain exercise is any activity that engages your thinking (cognitive) skills.    What types of activities are considered brain exercises?  Jigsaw puzzles, crossword puzzles, word jumble, memory games, word search, and many more.  Many can be found free online or on your phone via a mobile noreen.    Why should I do brain exercises before my surgery?  More recent research has shown brain exercise before surgery can lower the risk of postoperative delirium (confusion) which can be especially important for older adults.  Patients who did brain exercises for 5 to 10 hours the days before surgery, cut their risk  of postoperative delirium in half up to 1 week after surgery.    Sit-to-Stand Exercise    What is the sit-to-stand exercise?  The sit-to-stand exercise strengthens the muscles of your lower body and muscles in the center of your body (core muscles for stability) helping to maintain and improve your strength and mobility.  How do I do the sit-to-stand exercise?  The goal is to do this exercise without using your arms or hands.  If this is too difficult, use your arms and hands or a chair with armrests to help slowly push yourself to the standing position and lower yourself back to the sitting position. As the movement becomes easier use your arms and hands less.    Steps to the sit-to-stand exercise  Sit up tall in a sturdy chair, knees bent, feet flat on the floor shoulder-width apart.  Shift your hips/pelvis forward in the chair to correctly position yourself for the next movement.  Lean forward at your hips.  Stand up straight putting equal weight on both feet.  Check to be sure you are properly aligned with the chair, in a slow controlled movement sit back down.  Repeat this exercise 10-15 times.  If needed you can do it fewer times until your strength improves.  Rest for 1 minute.  Do another 10-15 sit-to-stand exercises.  Try to do this in the morning and evening.        Instructions    Preoperative Fasting Guidelines    Why must I stop eating and drinking near surgery time?  With sedation, food or liquid in your stomach can enter your lungs causing serious complications  Food can increase nausea and vomiting  When do I need to stop eating and drinking before my surgery?      Do not eat any food or drink any liquids after midnight the night before your surgery/procedure.  You may have sips of water to take medications.            Simple things you can do to help prevent blood clots     Blood clots are blockages that can form in the body's veins. When a blood clot forms in your deep veins, it may be called a  deep vein thrombosis, or DVT for short. Blood clots can happen in any part of the body where blood flows, but they are most common in the arms and legs. If a piece of a blood clot breaks free and travels to the lungs, it is called a pulmonary embolus (PE). A PE can be a very serious problem.         Being in the hospital or having surgery can raise your chances of getting a blood clot because you may not be well enough to move around as much as you normally do.         Ways you can help prevent blood clots in the hospital       Wearing SCDs  SCDs stands for Sequential Compression Devices.   SCDs are special sleeves that wrap around your legs. They attach to a pump that fills them with air to gently squeeze your legs every few minutes.  This helps return the blood in your legs to your heart.   SCDs should only be taken off when walking or bathing. SCDs may not be comfortable, but they can help save your life.              Pump SCD leg sleeves  Wearing compression stockings - if your doctor orders them. These special snug-fitting stockings gently squeeze your legs to help blood flow.       Walking. Walking helps move the blood in your legs.   If your doctor says it is ok, try walking the halls at least   5 times a day. Ask us to help you get up, so you don't fall.      Taking any blood-thinning medicines your doctor orders.              Ways you can help prevent blood clots at home         Wearing compression stockings - if your doctor orders them.   Walking - to help move the blood in your legs.    Taking any blood-thinning medicines your doctor orders.      Signs of a blood clot or PE    Tell your doctor or nurse right away if you have any of the problems listed below.         If you are at home, seek medical care right away. Call 911 for chest pain or problems breathing.            Signs of a blood clot (DVT) - such as pain, swelling, redness, or warmth in your arm or legs.  Signs of a pulmonary embolism (PE) -  such as chest pain or feeling short of breath      Tobacco and Alcohol;  Do not drink alcohol or smoke within 24 hours of surgery.  It is best to quit smoking for as long as possible before any surgery or procedure.      The Week before Surgery        Seven days before Surgery  Check your PAT medication instructions  Do the exercises provided to you by PAT  Arrange for a responsible, adult licensed  to take you home after surgery and stay with you for 24 hours.  You will not be permitted to drive yourself home if you have received any anesthetic/sedation  Six days before surgery  Check your PAT medication instructions  Do the exercises provided to you by PAT  Start using Chlorhexidene (CHG) body wash if prescribed  Five days before surgery  Check your PAT medication instructions  Do the exercises provided to you by PAT  Continue to use CHG body wash if prescribed  Three days before surgery  Check your PAT medication instructions  Do the exercises provided to you by PAT  Continue to use CHG body wash if prescribed  Two days before surgery  Check your PAT medication instructions  Do the exercises provided to you by PAT  Continue to use CHG body wash if prescribed    The Day before Surgery       Check your PAT medication and all other PAT instructions including when to stop eating and drinking  You will be called with your arrival time for surgery in the late afternoon.  If you do not receive a call please reach out to [facility] Pre-Op. [phone]  Do not smoke or drink 24 hours before surgery  Prepare items to bring with you to the hospital  Shower with your chlorhexidine wash if prescribed  Brush your teeth and use your chlorhexidine dental rinse if prescribed    The Day of Surgery       Check your PAT medication instructions  Ensure you follow the instructions for when to stop eating and drinking  Shower, if prescribed use CHG.  Do not apply any lotions, creams, moisturizers, perfume or deodorant  Brush your  teeth and use your CHG dental rinse if prescribed  Wear loose comfortable clothing  Avoid make-up  Remove  jewelry and piercings, consider professional piercing removal with a plastic spacer if needed  Bring photo ID and Insurance card  Bring an accurate medication list that includes medication dose, frequency and allergies  Bring a copy of your advanced directives (will, health care power of )  Bring any devices and controllers as well as medical devices you have been provided with for surgery (CPAP, slings, braces, etc.)  Dentures, eyeglasses, and contacts will be removed before surgery, please bring cases for contacts or glasses
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
## 2024-10-16 ENCOUNTER — APPOINTMENT (OUTPATIENT)
Dept: RADIOLOGY | Facility: HOSPITAL | Age: 64
End: 2024-10-16
Payer: COMMERCIAL

## 2024-10-16 ENCOUNTER — ANESTHESIA (OUTPATIENT)
Dept: OPERATING ROOM | Facility: HOSPITAL | Age: 64
End: 2024-10-16

## 2024-10-16 ENCOUNTER — HOSPITAL ENCOUNTER (OUTPATIENT)
Facility: HOSPITAL | Age: 64
Setting detail: OUTPATIENT SURGERY
Discharge: HOME | End: 2024-10-16
Attending: ORTHOPAEDIC SURGERY | Admitting: ORTHOPAEDIC SURGERY
Payer: COMMERCIAL

## 2024-10-16 VITALS
SYSTOLIC BLOOD PRESSURE: 120 MMHG | HEART RATE: 73 BPM | RESPIRATION RATE: 16 BRPM | DIASTOLIC BLOOD PRESSURE: 58 MMHG | HEIGHT: 68 IN | BODY MASS INDEX: 38.62 KG/M2 | TEMPERATURE: 97.3 F | OXYGEN SATURATION: 99 % | WEIGHT: 254.85 LBS

## 2024-10-16 DIAGNOSIS — S62.614B DISPLACED FRACTURE OF PROXIMAL PHALANX OF RIGHT RING FINGER, INITIAL ENCOUNTER FOR OPEN FRACTURE: Primary | ICD-10-CM

## 2024-10-16 PROBLEM — G47.33 OSA (OBSTRUCTIVE SLEEP APNEA): Status: ACTIVE | Noted: 2024-10-16

## 2024-10-16 PROBLEM — E11.9 DIABETES MELLITUS, TYPE 2 (MULTI): Status: ACTIVE | Noted: 2024-10-16

## 2024-10-16 PROBLEM — E66.9 OBESITY: Status: ACTIVE | Noted: 2024-10-16

## 2024-10-16 PROBLEM — I10 HTN (HYPERTENSION): Status: ACTIVE | Noted: 2024-10-16

## 2024-10-16 PROBLEM — M19.019 OSTEOARTHRITIS OF SHOULDER: Status: ACTIVE | Noted: 2024-10-16

## 2024-10-16 LAB
ATRIAL RATE: 70 BPM
GLUCOSE BLD MANUAL STRIP-MCNC: 99 MG/DL (ref 74–99)
P AXIS: 68 DEGREES
P OFFSET: 167 MS
P ONSET: 117 MS
PR INTERVAL: 192 MS
Q ONSET: 213 MS
QRS COUNT: 11 BEATS
QRS DURATION: 86 MS
QT INTERVAL: 396 MS
QTC CALCULATION(BAZETT): 427 MS
QTC FREDERICIA: 417 MS
R AXIS: -33 DEGREES
T AXIS: 23 DEGREES
T OFFSET: 411 MS
VENTRICULAR RATE: 70 BPM

## 2024-10-16 PROCEDURE — 3600000008 HC OR TIME - EACH INCREMENTAL 1 MINUTE - PROCEDURE LEVEL THREE: Performed by: ORTHOPAEDIC SURGERY

## 2024-10-16 PROCEDURE — 2500000005 HC RX 250 GENERAL PHARMACY W/O HCPCS: Performed by: ORTHOPAEDIC SURGERY

## 2024-10-16 PROCEDURE — A26746 PR OPEN TX ARTICULAR FRACTURE MCP/IP JOINT EA: Performed by: ANESTHESIOLOGY

## 2024-10-16 PROCEDURE — 2500000004 HC RX 250 GENERAL PHARMACY W/ HCPCS (ALT 636 FOR OP/ED): Performed by: ANESTHESIOLOGY

## 2024-10-16 PROCEDURE — 2500000004 HC RX 250 GENERAL PHARMACY W/ HCPCS (ALT 636 FOR OP/ED): Performed by: ORTHOPAEDIC SURGERY

## 2024-10-16 PROCEDURE — 82947 ASSAY GLUCOSE BLOOD QUANT: CPT

## 2024-10-16 PROCEDURE — 3700000001 HC GENERAL ANESTHESIA TIME - INITIAL BASE CHARGE: Performed by: ORTHOPAEDIC SURGERY

## 2024-10-16 PROCEDURE — A26746 PR OPEN TX ARTICULAR FRACTURE MCP/IP JOINT EA

## 2024-10-16 PROCEDURE — 26615 TREAT METACARPAL FRACTURE: CPT | Performed by: ORTHOPAEDIC SURGERY

## 2024-10-16 PROCEDURE — 2500000001 HC RX 250 WO HCPCS SELF ADMINISTERED DRUGS (ALT 637 FOR MEDICARE OP): Performed by: ORTHOPAEDIC SURGERY

## 2024-10-16 PROCEDURE — 3600000003 HC OR TIME - INITIAL BASE CHARGE - PROCEDURE LEVEL THREE: Performed by: ORTHOPAEDIC SURGERY

## 2024-10-16 PROCEDURE — 76000 FLUOROSCOPY <1 HR PHYS/QHP: CPT

## 2024-10-16 PROCEDURE — 7100000009 HC PHASE TWO TIME - INITIAL BASE CHARGE: Performed by: ORTHOPAEDIC SURGERY

## 2024-10-16 PROCEDURE — C1713 ANCHOR/SCREW BN/BN,TIS/BN: HCPCS | Performed by: ORTHOPAEDIC SURGERY

## 2024-10-16 PROCEDURE — 2780000003 HC OR 278 NO HCPCS: Performed by: ORTHOPAEDIC SURGERY

## 2024-10-16 PROCEDURE — 7100000002 HC RECOVERY ROOM TIME - EACH INCREMENTAL 1 MINUTE: Performed by: ORTHOPAEDIC SURGERY

## 2024-10-16 PROCEDURE — 7100000010 HC PHASE TWO TIME - EACH INCREMENTAL 1 MINUTE: Performed by: ORTHOPAEDIC SURGERY

## 2024-10-16 PROCEDURE — 2720000007 HC OR 272 NO HCPCS: Performed by: ORTHOPAEDIC SURGERY

## 2024-10-16 PROCEDURE — 7100000001 HC RECOVERY ROOM TIME - INITIAL BASE CHARGE: Performed by: ORTHOPAEDIC SURGERY

## 2024-10-16 PROCEDURE — 3700000002 HC GENERAL ANESTHESIA TIME - EACH INCREMENTAL 1 MINUTE: Performed by: ORTHOPAEDIC SURGERY

## 2024-10-16 PROCEDURE — 26746 TREAT FINGER FRACTURE EACH: CPT | Performed by: ORTHOPAEDIC SURGERY

## 2024-10-16 PROCEDURE — 2500000004 HC RX 250 GENERAL PHARMACY W/ HCPCS (ALT 636 FOR OP/ED)

## 2024-10-16 DEVICE — INNATE IS A STAINLESS STEEL BONE SCREW
Type: IMPLANTABLE DEVICE | Site: HAND | Status: FUNCTIONAL
Brand: INNATE IMPLANT

## 2024-10-16 RX ORDER — SODIUM CHLORIDE 0.9 G/100ML
IRRIGANT IRRIGATION AS NEEDED
Status: DISCONTINUED | OUTPATIENT
Start: 2024-10-16 | End: 2024-10-16 | Stop reason: HOSPADM

## 2024-10-16 RX ORDER — MIDAZOLAM HYDROCHLORIDE 1 MG/ML
INJECTION INTRAMUSCULAR; INTRAVENOUS AS NEEDED
Status: DISCONTINUED | OUTPATIENT
Start: 2024-10-16 | End: 2024-10-16

## 2024-10-16 RX ORDER — MEPERIDINE HYDROCHLORIDE 25 MG/ML
12.5 INJECTION INTRAMUSCULAR; INTRAVENOUS; SUBCUTANEOUS EVERY 10 MIN PRN
Status: DISCONTINUED | OUTPATIENT
Start: 2024-10-16 | End: 2024-10-17 | Stop reason: HOSPADM

## 2024-10-16 RX ORDER — PROPOFOL 10 MG/ML
INJECTION, EMULSION INTRAVENOUS AS NEEDED
Status: DISCONTINUED | OUTPATIENT
Start: 2024-10-16 | End: 2024-10-16

## 2024-10-16 RX ORDER — CHLORHEXIDINE GLUCONATE 40 MG/ML
SOLUTION TOPICAL AS NEEDED
Status: DISCONTINUED | OUTPATIENT
Start: 2024-10-16 | End: 2024-10-16 | Stop reason: HOSPADM

## 2024-10-16 RX ORDER — CEFAZOLIN 1 G/1
INJECTION, POWDER, FOR SOLUTION INTRAVENOUS AS NEEDED
Status: DISCONTINUED | OUTPATIENT
Start: 2024-10-16 | End: 2024-10-16

## 2024-10-16 RX ORDER — SODIUM CHLORIDE, SODIUM LACTATE, POTASSIUM CHLORIDE, CALCIUM CHLORIDE 600; 310; 30; 20 MG/100ML; MG/100ML; MG/100ML; MG/100ML
20 INJECTION, SOLUTION INTRAVENOUS CONTINUOUS
Status: DISCONTINUED | OUTPATIENT
Start: 2024-10-16 | End: 2024-10-17 | Stop reason: HOSPADM

## 2024-10-16 RX ORDER — ONDANSETRON HYDROCHLORIDE 2 MG/ML
INJECTION, SOLUTION INTRAVENOUS AS NEEDED
Status: DISCONTINUED | OUTPATIENT
Start: 2024-10-16 | End: 2024-10-16

## 2024-10-16 RX ORDER — LIDOCAINE HCL/PF 100 MG/5ML
SYRINGE (ML) INTRAVENOUS AS NEEDED
Status: DISCONTINUED | OUTPATIENT
Start: 2024-10-16 | End: 2024-10-16

## 2024-10-16 RX ORDER — OXYCODONE HYDROCHLORIDE 5 MG/1
5 TABLET ORAL EVERY 4 HOURS PRN
Status: DISCONTINUED | OUTPATIENT
Start: 2024-10-16 | End: 2024-10-17 | Stop reason: HOSPADM

## 2024-10-16 RX ORDER — SODIUM CHLORIDE, SODIUM LACTATE, POTASSIUM CHLORIDE, CALCIUM CHLORIDE 600; 310; 30; 20 MG/100ML; MG/100ML; MG/100ML; MG/100ML
100 INJECTION, SOLUTION INTRAVENOUS CONTINUOUS
Status: DISCONTINUED | OUTPATIENT
Start: 2024-10-16 | End: 2024-10-17 | Stop reason: HOSPADM

## 2024-10-16 RX ORDER — ALBUTEROL SULFATE 0.83 MG/ML
2.5 SOLUTION RESPIRATORY (INHALATION) ONCE AS NEEDED
Status: DISCONTINUED | OUTPATIENT
Start: 2024-10-16 | End: 2024-10-17 | Stop reason: HOSPADM

## 2024-10-16 RX ORDER — DEXMEDETOMIDINE IN 0.9 % NACL 20 MCG/5ML
SYRINGE (ML) INTRAVENOUS AS NEEDED
Status: DISCONTINUED | OUTPATIENT
Start: 2024-10-16 | End: 2024-10-16

## 2024-10-16 RX ORDER — DIPHENHYDRAMINE HYDROCHLORIDE 50 MG/ML
12.5 INJECTION INTRAMUSCULAR; INTRAVENOUS ONCE AS NEEDED
Status: DISCONTINUED | OUTPATIENT
Start: 2024-10-16 | End: 2024-10-17 | Stop reason: HOSPADM

## 2024-10-16 RX ORDER — DROPERIDOL 2.5 MG/ML
0.62 INJECTION, SOLUTION INTRAMUSCULAR; INTRAVENOUS ONCE AS NEEDED
Status: DISCONTINUED | OUTPATIENT
Start: 2024-10-16 | End: 2024-10-17 | Stop reason: HOSPADM

## 2024-10-16 RX ORDER — OXYCODONE HYDROCHLORIDE 5 MG/1
5 TABLET ORAL EVERY 4 HOURS PRN
Qty: 18 TABLET | Refills: 0 | Status: SHIPPED | OUTPATIENT
Start: 2024-10-16 | End: 2024-10-24 | Stop reason: HOSPADM

## 2024-10-16 RX ORDER — ONDANSETRON HYDROCHLORIDE 2 MG/ML
4 INJECTION, SOLUTION INTRAVENOUS ONCE AS NEEDED
Status: DISCONTINUED | OUTPATIENT
Start: 2024-10-16 | End: 2024-10-17 | Stop reason: HOSPADM

## 2024-10-16 RX ORDER — FENTANYL CITRATE 50 UG/ML
INJECTION, SOLUTION INTRAMUSCULAR; INTRAVENOUS AS NEEDED
Status: DISCONTINUED | OUTPATIENT
Start: 2024-10-16 | End: 2024-10-16

## 2024-10-16 RX ORDER — BUPIVACAINE HYDROCHLORIDE 5 MG/ML
INJECTION, SOLUTION EPIDURAL; INTRACAUDAL AS NEEDED
Status: DISCONTINUED | OUTPATIENT
Start: 2024-10-16 | End: 2024-10-16 | Stop reason: HOSPADM

## 2024-10-16 SDOH — HEALTH STABILITY: MENTAL HEALTH: CURRENT SMOKER: 0

## 2024-10-16 ASSESSMENT — PAIN SCALES - GENERAL
PAINLEVEL_OUTOF10: 8
PAINLEVEL_OUTOF10: 0 - NO PAIN

## 2024-10-16 ASSESSMENT — PAIN DESCRIPTION - LOCATION: LOCATION: HAND

## 2024-10-16 ASSESSMENT — PAIN SCALES - WONG BAKER
WONGBAKER_NUMERICALRESPONSE: HURTS LITTLE BIT
WONGBAKER_NUMERICALRESPONSE: HURTS WHOLE LOT

## 2024-10-16 ASSESSMENT — PAIN - FUNCTIONAL ASSESSMENT: PAIN_FUNCTIONAL_ASSESSMENT: 0-10

## 2024-10-16 ASSESSMENT — PAIN DESCRIPTION - ORIENTATION: ORIENTATION: RIGHT

## 2024-10-16 NOTE — OP NOTE
Pre-Operative Diagnosis: Comminuted right fourth metacarpal shaft fracture and intra-articular ring finger proximal phalanx fracture  Post-Operative Diagnosis: same  Procedure: Operative fixation right fourth metacarpal shaft fracture and operative fixation right ring finger proximal phalanx intra-articular fracture  Surgeon: Ravindra  Assistant: Mona  Anesthesia: General  Complications: none  Estimated blood loss: Minimal  Specimen: None  Implants:  Implant Name Type Inv. Item Serial No.  Lot No. LRB No. Used Action   INNATE OMPLANT 3.6mm X 40mm    [employer]  Right 1 Implanted   18mm, 2.5 MICRO ACUTRAK 3 BONE SCREW    [employer]  Right 1 Implanted   26mm, 2.5 MICRO SCUTRAK 3 BONE SCREW    [employer]  Right 1 Implanted     Findings: See below  Disposition: Good/PACU      Indications: 64-year-old female with right fourth metacarpal shaft fracture and intra-articular proximal phalanx fracture of the ring finger with significant malrotation.  We discussed risks and benefits of nonoperative versus operative treatment options and after thoroughly reviewing patient wished proceed with surgical intervention, as indicated.  Expected operative and postoperative courses reviewed and questions addressed.    Operative course: Patient was greeted in the preoperative holding area and the operative site was marked with indelible marker.  She was brought back to the operating room suite where general anesthetic was induced by the anesthesia team.  Right upper extremity was prepped and draped in standard sterile fashion and timeout procedure was performed as per standard protocol.  Esmarch was used to exsanguinate right upper extremity and upper arm tourniquet was inflated.  Attention was first taken to fixation of the fourth metacarpal.  After obtaining fluoroscopic images it was determined that the metacarpal shaft would be appropriate for intramedullary screw fixation.  While holding the metacarpal in a reduced  position a K wire was introduced and the dorsal third of the metacarpal head along the line of the metacarpal shaft.  Fluoroscopic imaging confirmed positioning of this K wire as well as reduction of the fracture.  Clinical exam also confirmed appropriate rotational alignment.  A 15 blade was then used to make a stab incision immediately around the K wire and K wire was further advanced after being measured.  Drill was then utilized and following this a 3.6 x 40 mm screw was inserted while continuing to hold the finger in appropriate rotational alignment.  Fluoroscopic imaging confirmed implant positioning as well as fracture reduction of the fourth metacarpal.  The K wire was then removed and attention was taken to the intra-articular ring finger proximal phalanx fracture.    K wires were utilized and introduced in an antegrade fashion while holding the fracture in a reduced position.  Fluoroscopic imaging confirms reduction and following this the K wires were then overdrilled and two 2.5 millimeter screws were inserted in an antegrade fashion after appropriate measuring.  Fluoroscopic imaging at this point demonstrated that the proximal fragment had significantly displaced volarly.  The screws were removed and, in order to capture the proximal fracture fragment better, K wires were introduced in a retrograde fashion.  Fluoroscopic imaging again confirmed reduction.  These K wires were then further advanced in a retrograde fashion while holding the finger fully flexed.  This was able to be achieved while keeping the K wires in soft tissues and avoiding the metacarpal head.  The near cortex was then overdrilled and 2.5 mm headless compression screws were again inserted.  Fluoroscopic imaging confirmed implant positioning as well as better alignment of the highly comminuted intra-articular proximal phalanx fracture.  There was still slight volar subluxation, but overall joint appeared to be well reduced.  Final  fluoroscopic imaging confirms positioning of the fourth metacarpal shaft fracture as well as the ring finger proximal phalanx fracture.  The stab wounds were irrigated and held closed while Dermabond was applied to the skin.  Digital block was performed and dry sterile dressings were placed.  Ulnar gutter splint was applied including the small, ring and middle fingers.    Patient was awoken from anesthesia uneventfully taken the recovery for further care.  Plan for transitioning to Thermoplast ulnar gutter splint in 5 days.  She is still tentatively scheduled to undergo arthroscopic rotator cuff surgery.  The hand will be protected during this procedure.  She will remain strictly nonweightbearing to her ulnar 3 digits/ulnar aspect of her hand.      I did discuss with patient and her sister in the PACU the comminuted nature of the proximal phalanx fracture.  We did discuss higher potential for posttraumatic arthritis that may benefit from additional procedures in the future.  Questions addressed.

## 2024-10-16 NOTE — ANESTHESIA PREPROCEDURE EVALUATION
Patient: Brigida Phillips    Procedure Information       Date/Time: 10/16/24 1145    Procedure: ORIF RIGHT HAND/RING FINGER (Right: Hand)    Location: [facility] / [facility]    Surgeons: Geovanny Waite MD            Relevant Problems   Anesthesia (within normal limits)      Cardiac   (+) Arthralgia of right acromioclavicular joint   (+) HTN (hypertension)   (+) Hyperlipidemia      Pulmonary   (+) BILLIE (obstructive sleep apnea) (Home CPAP)      Neuro   (+) Chronic lumbar radiculopathy      /Renal (within normal limits)      Liver (within normal limits)      Endocrine   (+) Diabetes mellitus, type 2 (Multi)   (+) Nontoxic multinodular goiter   (+) Obesity      Hematology (within normal limits)      Musculoskeletal   (+) Osteoarthritis of shoulder     Vitals:    10/16/24 1018   BP: 136/84   Pulse: 64   Resp: 16   Temp: 36.2 °C (97.2 °F)   SpO2: 98%       Past Surgical History:   Procedure Laterality Date    BACK SURGERY  2014    Back Surgery    CATARACT EXTRACTION  2014    Cataract Surgery    KNEE ARTHROSCOPY W/ DEBRIDEMENT  2014    Knee Arthroscopy (Therapeutic)    OTHER SURGICAL HISTORY Left 2014    Ankle Arthroscopy    OTHER SURGICAL HISTORY  2019    Eye surgery    OTHER SURGICAL HISTORY  2019    Tubal ligation    SHOULDER ARTHROSCOPY W/ ROTATOR CUFF REPAIR Left     TOTAL KNEE ARTHROPLASTY Bilateral      Past Medical History:   Diagnosis Date    Body mass index (BMI) 38.0-38.9, adult 10/15/2024    Displaced fracture of proximal phalanx of right ring finger 10/11/2024    Dyspnea     Encounter for full-term uncomplicated delivery      (spontaneous vaginal delivery)    GERD (gastroesophageal reflux disease)     History of chickenpox     Hyperlipidemia     Hypertension     BILLIE on CPAP     Type 2 diabetes mellitus        Current Facility-Administered Medications:     lactated Ringer's infusion, 20 mL/hr, intravenous, Continuous, Randall Rodriguez MD, Last Rate: 20 mL/hr at  "10/16/24 1036, 20 mL/hr at 10/16/24 1036  Prior to Admission medications    Medication Sig Start Date End Date Taking? Authorizing Provider   BD Prema 2nd Gen Pen Needle 32 gauge x 5/32\" needle Once daily 4/25/24  Yes Darwin Gutierres MD   blood-glucose meter (OneTouch Verio Flex meter) misc For glucose testing once daily 10/24/23  Yes Darwin Gutierres MD   carvedilol (Coreg) 6.25 mg tablet Take 1 tablet (6.25 mg) by mouth 2 times a day. 5/20/24  Yes Jean Paul Hightower DO   docusate sodium (Colace) 100 mg capsule Take by mouth twice a day. 2/4/21  Yes Historical Provider, MD   escitalopram (Lexapro) 20 mg tablet Take 1 tablet (20 mg) by mouth once daily.  Patient taking differently: Take 1 tablet (20 mg) by mouth once daily at bedtime. 8/19/24 2/15/25 Yes Jean Paul Hightower DO   Lantus Solostar U-100 Insulin 100 unit/mL (3 mL) pen Inject 12 Units under the skin once daily at bedtime. 4/25/24  Yes Darwin Gutierres MD   Mounjaro 7.5 mg/0.5 mL pen injector Inject 7.5 mg under the skin every 7 days.  Patient taking differently: Inject 7.5 mg under the skin every 7 days. Hasn't started yet 7.5mg yet.  Was on 5mg.  Last dose 10/4 9/24/24  Yes Darwin Gutierres MD   naproxen (Naprosyn) 500 mg tablet Take 1 tablet (500 mg) by mouth every 12 hours. 4/13/22  Yes Historical Provider, MD   naproxen (Naprosyn) 500 mg tablet Take 1 tablet (500 mg) by mouth 2 times a day as needed for mild pain (1 - 3). 9/18/24 10/18/24 Yes GRACE Sanchez MD   nystatin (Mycostatin) 100,000 unit/gram powder Apply to affected area 2-3 times a day as needed 1/26/22  Yes Historical Provider, MD   OneTouch Delica Plus Lancet 33 gauge misc For glucose testing once daily 9/24/24  Yes Darwin Gutierres MD   OneTouch Verio test strips strip For glucose testing three times daily 4/25/24  Yes Darwin Gutierres MD   oxyCODONE-acetaminophen (Percocet) 5-325 mg tablet Take 1 tablet by mouth every 6 hours if needed for severe pain (7 - 10) for up to 3 days. 10/11/24 " 10/16/24 Yes Hong Roa PA-C   rosuvastatin (Crestor) 20 mg tablet Take 1 tablet (20 mg) by mouth once daily. 6/27/24 6/27/25 Yes Darwin Gutierres MD   acetaminophen (Tylenol) 500 mg tablet Take 1 tablet (500 mg) by mouth every 8 hours. 2/4/21 10/15/24  Historical Provider, MD     Allergies   Allergen Reactions    Ozempic [Semaglutide] Itching and Other     Dry heaves     Social History     Tobacco Use    Smoking status: Former     Types: Cigarettes    Smokeless tobacco: Not on file   Substance Use Topics    Alcohol use: Never         Chemistry    Lab Results   Component Value Date/Time     10/11/2024 1407    K 3.9 10/11/2024 1407     10/11/2024 1407    CO2 27 10/11/2024 1407    BUN 17 10/11/2024 1407    CREATININE 0.75 10/11/2024 1407    Lab Results   Component Value Date/Time    CALCIUM 9.5 10/11/2024 1407    ALKPHOS 75 10/11/2024 1407    AST 21 10/11/2024 1407    ALT 19 10/11/2024 1407    BILITOT 0.6 10/11/2024 1407          Lab Results   Component Value Date/Time    WBC 7.4 10/11/2024 1407    HGB 12.9 10/11/2024 1407    HCT 40.3 10/11/2024 1407     10/11/2024 1407     Lab Results   Component Value Date/Time    PROTIME 11.2 10/11/2024 1407    INR 1.0 10/11/2024 1407     Encounter Date: 10/11/24   ECG 12 lead   Result Value    Ventricular Rate 70    Atrial Rate 70    WY Interval 192    QRS Duration 86    QT Interval 396    QTC Calculation(Bazett) 427    P Axis 68    R Axis -33    T Axis 23    QRS Count 11    Q Onset 213    P Onset 117    P Offset 167    T Offset 411    QTC Fredericia 417    Narrative    Normal sinus rhythm  Left axis deviation  Cannot rule out Anterior infarct (cited on or before 29-JUN-2020)  Abnormal ECG  When compared with ECG of 29-JUN-2020 09:57,  No significant change was found     Echo 2024  PHYSICIAN INTERPRETATION:  Left Ventricle: The left ventricular systolic function is normal, with a visually estimated ejection fraction of 60-65%. There are no regional left  ventricular wall motion abnormalities. The left ventricular cavity size is normal. The left ventricular septal wall thickness is normal. Spectral Doppler shows a normal pattern of left ventricular diastolic filling.  Left Atrium: The left atrium is normal in size.  Right Ventricle: The right ventricle is normal in size. There is normal right ventricular global systolic function.  Right Atrium: The right atrium is normal in size.  Aortic Valve: The aortic valve is trileaflet. There is mild aortic valve cusp calcification. There is evidence of mildly elevated transaortic gradients consistent with sclerosis of the aortic valve. The aortic valve dimensionless index is 0.55. There is no evidence of aortic valve regurgitation. The peak instantaneous gradient of the aortic valve is 10.6 mmHg. The mean gradient of the aortic valve is 6.9 mmHg.  Mitral Valve: The mitral valve is normal in structure. There is mild mitral annular calcification. There is trace mitral valve regurgitation.  Tricuspid Valve: The tricuspid valve is structurally normal. There is trace tricuspid regurgitation. The right ventricular systolic pressure is unable to be estimated.  Pulmonic Valve: The pulmonic valve is structurally normal. There is physiologic pulmonic valve regurgitation.  Pericardium: There is no pericardial effusion noted.  Aorta: The aortic root is normal.  Systemic Veins: The inferior vena cava appears normal in size, with IVC inspiratory collapse greater than 50%.  In comparison to the previous echocardiogram(s): There are no prior studies on this patient for comparison purposes.     CONCLUSIONS:   1. The left ventricular systolic function is normal, with a visually estimated ejection fraction of 60-65%.   2. There is normal right ventricular global systolic function.   3. Aortic valve sclerosis.      Clinical information reviewed:   Tobacco  Allergies  Meds   Med Hx  Surg Hx   Fam Hx  Soc Hx        NPO Detail:  NPO/Void  Status  Date of Last Liquid: 10/15/24  Date of Last Solid: 10/15/24         Physical Exam    Airway  Mallampati: III  TM distance: >3 FB  Neck ROM: full     Cardiovascular   Rhythm: regular  Rate: normal     Dental   Comments: Crown (bottom left)   Pulmonary   Breath sounds clear to auscultation     Abdominal            Anesthesia Plan    History of general anesthesia?: yes  History of complications of general anesthesia?: no    ASA 3     general   (ASA monitors)  The patient is not a current smoker.    intravenous induction   Postoperative administration of opioids is intended.  Trial extubation is planned.  Anesthetic plan and risks discussed with patient.  Use of blood products discussed with patient who consented to blood products.    Plan discussed with CRNA.

## 2024-10-16 NOTE — INTERVAL H&P NOTE
H&P reviewed. The patient was examined and there are no changes to the H&P.    Risks and benefits of continued nonoperative versus operative treatment options were reviewed.  The surgical benefits and the potential complications were reviewed with the patient today, as well as the day surgical setting and the likely postoperative course.  Patient understands that the goal of surgery is prevention of worsening symptoms and potential relief of some symptoms.  Risks discussed included, but were not limited to, residual/recurrent/worsening symptoms, pain, infection, bleeding, stiffness, injury to nerve/blood vessels/tendons, wound healing issues and possible need for reoperation.  Questions addressed.

## 2024-10-16 NOTE — ANESTHESIA PROCEDURE NOTES
Airway  Date/Time: 10/16/2024 12:11 PM  Urgency: elective    Airway not difficult    Staffing  Performed: CRNA   Authorized by: Edgar Gambino DO    Performed by: JUDD Han-MYLES  Patient location during procedure: OR    Indications and Patient Condition  Indications for airway management: anesthesia  Spontaneous Ventilation: absent  Sedation level: deep  Preoxygenated: yes  Patient position: sniffing  Mask difficulty assessment: 1 - vent by mask  Planned trial extubation    Final Airway Details  Final airway type: supraglottic airway      Successful airway: Size 4     Number of attempts at approach: 1

## 2024-10-16 NOTE — ANESTHESIA POSTPROCEDURE EVALUATION
Patient: Brigida Phillips    Procedure Summary       Date: 10/16/24 Room / Location: [facility] / [facility]    Anesthesia Start: 1204 Anesthesia Stop: 1353    Procedure: ORIF RIGHT HAND/RING FINGER (Right: Hand) Diagnosis:       Displaced fracture of proximal phalanx of right ring finger, initial encounter for open fracture      (Displaced fracture of proximal phalanx of right ring finger, initial encounter for open fracture [X70.275Q])    Surgeons: Geovanny Waite MD Responsible Provider: Edgar Gambino DO    Anesthesia Type: general ASA Status: 3            Anesthesia Type: general    Vitals Value Taken Time   /56 10/16/24 1440   Temp 36.3 °C (97.3 °F) 10/16/24 1352   Pulse 63 10/16/24 1440   Resp 14 10/16/24 1440   SpO2 100 % 10/16/24 1440       Anesthesia Post Evaluation    Patient location during evaluation: PACU  Patient participation: complete - patient participated  Level of consciousness: awake and alert  Pain management: adequate  Airway patency: patent  Cardiovascular status: acceptable and blood pressure returned to baseline  Respiratory status: acceptable  Hydration status: acceptable  Postoperative Nausea and Vomiting: none        No notable events documented.

## 2024-10-16 NOTE — INTERVAL H&P NOTE
H&P reviewed. The patient was examined and there are no changes to the H&P.    Risks and benefits of continued nonoperative versus operative treatment options were reviewed.  The surgical benefits and the potential complications were reviewed with the patient today, as well as the day surgical setting and the likely postoperative course.  Patient understands that the goal of surgery is prevention of worsening symptoms and potential relief of some symptoms.  Also with goal of improved functional recovery.  Risks discussed included, but were not limited to, residual/recurrent/worsening symptoms, pain, infection, bleeding, stiffness, injury to nerve/blood vessels/tendons, wound healing issues and possible need for reoperation.  I answered the patient's questions and surgical consent reviewed and obtained.     The patient will followup with us in the operating room and then postoperatively.

## 2024-10-17 ENCOUNTER — TELEPHONE (OUTPATIENT)
Dept: ORTHOPEDIC SURGERY | Facility: CLINIC | Age: 64
End: 2024-10-17

## 2024-10-17 DIAGNOSIS — S62.614B DISPLACED FRACTURE OF PROXIMAL PHALANX OF RIGHT RING FINGER, INITIAL ENCOUNTER FOR OPEN FRACTURE: ICD-10-CM

## 2024-10-17 NOTE — TELEPHONE ENCOUNTER
Caller: Jose Shaikh    Relationship to patient: Self    Best call back number: [phone]    Patient is needing: PATIENT CALLED IN AND SAID HE WAS SEEN IN THE EMERGENCY ROOM YESTERDAY AND THEY DID AN X-RAY AND THEY FOUND ARTHRITIS IN HIS HIP AND PRESCRIBED MELOXICAM. HE IS REQUESTING A CALL BACK TO DISCUSS PAIN MEDICATION THAT HE HAD SPOKEN WITH NURSE ABOUT YESTERDAY         Spoke with patient and she was made aware she needs to be here Monday at [facility].

## 2024-10-17 NOTE — TELEPHONE ENCOUNTER
It is very normal to have significant pain and swelling the day after surgery. Patient should ice, elevate, and take pain medication as directed.     I will call patient back when I am able to.

## 2024-10-17 NOTE — TELEPHONE ENCOUNTER
She is to come in on Monday at [facility] to see our occupational therapist, Gayle. I scheduled an appointment for her on Monday with Dr. Waite at 1:15pm. She will first go back to see Gayle and then Dr. Waite will come see her while she is at occupational therapy

## 2024-10-17 NOTE — TELEPHONE ENCOUNTER
I instructed patient the below message. She agreed, she will continue to ice and elevate. She does believe Dr. Waite mentioning wanting to see her before her surgery with Daniel next Thursday and he was going to put her in some sort of apparatus?

## 2024-10-17 NOTE — TELEPHONE ENCOUNTER
10/16/24 rt hand orif  Patient called left a message and has some questions post-operatively. She has quite a bit of swelling and in a great deal of pain. Would like a call back.

## 2024-10-21 ENCOUNTER — APPOINTMENT (OUTPATIENT)
Dept: ORTHOPEDIC SURGERY | Facility: CLINIC | Age: 64
End: 2024-10-21

## 2024-10-21 ENCOUNTER — EVALUATION (OUTPATIENT)
Dept: OCCUPATIONAL THERAPY | Facility: HOSPITAL | Age: 64
End: 2024-10-21
Payer: MEDICARE

## 2024-10-21 DIAGNOSIS — S62.344A CLOSED NONDISPLACED FRACTURE OF BASE OF FOURTH METACARPAL BONE OF RIGHT HAND, INITIAL ENCOUNTER: ICD-10-CM

## 2024-10-21 DIAGNOSIS — S62.614B DISPLACED FRACTURE OF PROXIMAL PHALANX OF RIGHT RING FINGER, INITIAL ENCOUNTER FOR OPEN FRACTURE: ICD-10-CM

## 2024-10-21 DIAGNOSIS — M79.641 HAND PAIN, RIGHT: Primary | ICD-10-CM

## 2024-10-21 PROCEDURE — 99024 POSTOP FOLLOW-UP VISIT: CPT | Performed by: ORTHOPAEDIC SURGERY

## 2024-10-21 PROCEDURE — 97165 OT EVAL LOW COMPLEX 30 MIN: CPT | Mod: GO

## 2024-10-21 PROCEDURE — 97760 ORTHOTIC MGMT&TRAING 1ST ENC: CPT | Mod: GO

## 2024-10-21 PROCEDURE — 97763 ORTHC/PROSTC MGMT SBSQ ENC: CPT | Mod: GO
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
## 2024-10-22 ENCOUNTER — ANESTHESIA EVENT (OUTPATIENT)
Dept: OPERATING ROOM | Facility: HOSPITAL | Age: 64
End: 2024-10-22
Payer: COMMERCIAL

## 2024-10-22 NOTE — PROGRESS NOTES
History of Present Illness  Chief Complaint   Patient presents with    Right Hand - Post-op     10/16/24 right hand ORIF       Pain controlled     Examination  Right hand:  Incisions are well healing. No signs of infection.  Sensation grossly intact distally.  Capillary refill less than 2 seconds.  Good rotational alignment of the ring finger.  No crepitus with MCP motion.     Assessment:  Patient status post right fourth metacarpal and right ring finger proximal phalanx operative fixation     Plan  Patient will begin scar tissue massage.  Begin active mobilization with therapy guided exercises.  While doing exercises she will continue with odilia taping of middle and ring fingers.  Maintain nonweightbearing status.  We discussed expected recovery course as well as likely peak reaction at 4 to 6 weeks postoperatively.  Follow-up in 3 weeks with new right hand radiographs.  Patient is planning on proceeding with right shoulder surgery as previously scheduled.

## 2024-10-23 DIAGNOSIS — E11.65 TYPE 2 DIABETES MELLITUS WITH HYPERGLYCEMIA, WITHOUT LONG-TERM CURRENT USE OF INSULIN: ICD-10-CM

## 2024-10-24 ENCOUNTER — HOSPITAL ENCOUNTER (OUTPATIENT)
Facility: HOSPITAL | Age: 64
Setting detail: OUTPATIENT SURGERY
Discharge: HOME | End: 2024-10-24
Attending: ORTHOPAEDIC SURGERY | Admitting: ORTHOPAEDIC SURGERY
Payer: COMMERCIAL

## 2024-10-24 ENCOUNTER — ANESTHESIA (OUTPATIENT)
Dept: OPERATING ROOM | Facility: HOSPITAL | Age: 64
End: 2024-10-24
Payer: COMMERCIAL

## 2024-10-24 ENCOUNTER — APPOINTMENT (OUTPATIENT)
Dept: ORTHOPEDIC SURGERY | Facility: CLINIC | Age: 64
End: 2024-10-24
Payer: COMMERCIAL

## 2024-10-24 ENCOUNTER — PHARMACY VISIT (OUTPATIENT)
Dept: PHARMACY | Facility: CLINIC | Age: 64
End: 2024-10-24
Payer: MEDICARE

## 2024-10-24 VITALS
DIASTOLIC BLOOD PRESSURE: 57 MMHG | HEART RATE: 66 BPM | RESPIRATION RATE: 23 BRPM | OXYGEN SATURATION: 93 % | HEIGHT: 68 IN | SYSTOLIC BLOOD PRESSURE: 127 MMHG | TEMPERATURE: 96.8 F | BODY MASS INDEX: 37.76 KG/M2 | WEIGHT: 249.12 LBS

## 2024-10-24 DIAGNOSIS — M75.21 BICEPS TENDINITIS OF RIGHT SHOULDER: ICD-10-CM

## 2024-10-24 DIAGNOSIS — S43.431A LABRAL TEAR OF SHOULDER, RIGHT, INITIAL ENCOUNTER: ICD-10-CM

## 2024-10-24 DIAGNOSIS — M75.101 TEAR OF RIGHT ROTATOR CUFF, UNSPECIFIED TEAR EXTENT, UNSPECIFIED WHETHER TRAUMATIC: ICD-10-CM

## 2024-10-24 DIAGNOSIS — M25.511 ARTHRALGIA OF RIGHT ACROMIOCLAVICULAR JOINT: ICD-10-CM

## 2024-10-24 DIAGNOSIS — M75.121 COMPLETE TEAR OF RIGHT ROTATOR CUFF, UNSPECIFIED WHETHER TRAUMATIC: Primary | ICD-10-CM

## 2024-10-24 DIAGNOSIS — M75.41 IMPINGEMENT SYNDROME OF RIGHT SHOULDER: ICD-10-CM

## 2024-10-24 LAB — GLUCOSE BLD MANUAL STRIP-MCNC: 122 MG/DL (ref 74–99)

## 2024-10-24 PROCEDURE — RXMED WILLOW AMBULATORY MEDICATION CHARGE

## 2024-10-24 PROCEDURE — 2500000004 HC RX 250 GENERAL PHARMACY W/ HCPCS (ALT 636 FOR OP/ED): Performed by: NURSE ANESTHETIST, CERTIFIED REGISTERED

## 2024-10-24 PROCEDURE — 29823 SHO ARTHRS SRG XTNSV DBRDMT: CPT | Performed by: ORTHOPAEDIC SURGERY

## 2024-10-24 PROCEDURE — 7100000010 HC PHASE TWO TIME - EACH INCREMENTAL 1 MINUTE: Performed by: ORTHOPAEDIC SURGERY

## 2024-10-24 PROCEDURE — 2500000004 HC RX 250 GENERAL PHARMACY W/ HCPCS (ALT 636 FOR OP/ED): Performed by: ANESTHESIOLOGY

## 2024-10-24 PROCEDURE — 7100000001 HC RECOVERY ROOM TIME - INITIAL BASE CHARGE: Performed by: ORTHOPAEDIC SURGERY

## 2024-10-24 PROCEDURE — 7100000002 HC RECOVERY ROOM TIME - EACH INCREMENTAL 1 MINUTE: Performed by: ORTHOPAEDIC SURGERY

## 2024-10-24 PROCEDURE — 7100000009 HC PHASE TWO TIME - INITIAL BASE CHARGE: Performed by: ORTHOPAEDIC SURGERY

## 2024-10-24 PROCEDURE — 29824 SHO ARTHRS SRG DSTL CLAVICLC: CPT | Performed by: ORTHOPAEDIC SURGERY

## 2024-10-24 PROCEDURE — 2720000007 HC OR 272 NO HCPCS: Performed by: ORTHOPAEDIC SURGERY

## 2024-10-24 PROCEDURE — 2780000003 HC OR 278 NO HCPCS: Performed by: ORTHOPAEDIC SURGERY

## 2024-10-24 PROCEDURE — 29826 SHO ARTHRS SRG DECOMPRESSION: CPT | Performed by: ORTHOPAEDIC SURGERY

## 2024-10-24 PROCEDURE — C1713 ANCHOR/SCREW BN/BN,TIS/BN: HCPCS | Performed by: ORTHOPAEDIC SURGERY

## 2024-10-24 PROCEDURE — 3700000001 HC GENERAL ANESTHESIA TIME - INITIAL BASE CHARGE: Performed by: ORTHOPAEDIC SURGERY

## 2024-10-24 PROCEDURE — 3600000009 HC OR TIME - EACH INCREMENTAL 1 MINUTE - PROCEDURE LEVEL FOUR: Performed by: ORTHOPAEDIC SURGERY

## 2024-10-24 PROCEDURE — C1769 GUIDE WIRE: HCPCS | Performed by: ORTHOPAEDIC SURGERY

## 2024-10-24 PROCEDURE — 2500000004 HC RX 250 GENERAL PHARMACY W/ HCPCS (ALT 636 FOR OP/ED): Performed by: ORTHOPAEDIC SURGERY

## 2024-10-24 PROCEDURE — 29828 SHO ARTHRS SRG BICP TENODSIS: CPT | Performed by: ORTHOPAEDIC SURGERY

## 2024-10-24 PROCEDURE — 82947 ASSAY GLUCOSE BLOOD QUANT: CPT

## 2024-10-24 PROCEDURE — 3700000002 HC GENERAL ANESTHESIA TIME - EACH INCREMENTAL 1 MINUTE: Performed by: ORTHOPAEDIC SURGERY

## 2024-10-24 PROCEDURE — 3600000004 HC OR TIME - INITIAL BASE CHARGE - PROCEDURE LEVEL FOUR: Performed by: ORTHOPAEDIC SURGERY

## 2024-10-24 PROCEDURE — 29827 SHO ARTHRS SRG RT8TR CUF RPR: CPT | Performed by: ORTHOPAEDIC SURGERY

## 2024-10-24 DEVICE — EXPRESSEW III AUTOCAPTURE+ LOADING TOOL/RETENTION PLATE FOR USE WITH E3AC+ FLEXIBLE SUTURE PASSER
Type: IMPLANTABLE DEVICE | Site: SHOULDER | Status: NON-FUNCTIONAL
Brand: EXPRESSEW

## 2024-10-24 DEVICE — HEALIX ADVANCE SP BIOCOMPOSITE ANCHOR TCP/PLLA ABSORBABLE ANCHOR 5.5MM
Type: IMPLANTABLE DEVICE | Site: SHOULDER | Status: FUNCTIONAL
Brand: HEALIX ADVANCE

## 2024-10-24 DEVICE — GRAFTMAX INDICATOR PIN
Type: IMPLANTABLE DEVICE | Site: SHOULDER | Status: NON-FUNCTIONAL
Brand: GRAFTMAX

## 2024-10-24 DEVICE — MILAGRO ADVANCE INTERFERENCE SCREW ABSORBABLE - TCP/PLGA 8 X 23MM
Type: IMPLANTABLE DEVICE | Site: SHOULDER | Status: FUNCTIONAL
Brand: MILAGRO

## 2024-10-24 DEVICE — IMPLANTABLE DEVICE
Type: IMPLANTABLE DEVICE | Site: SHOULDER | Status: FUNCTIONAL
Brand: VERSALOOP™ ANCHOR 2 SUTURE, 2.5MM

## 2024-10-24 DEVICE — HEALIX ADVANCE BR 3 SUTURE ANCHOR W/DYNACORD TCP/PLGA ABSORBABLE ANCHOR (1) BLUE (1) WHITE/BLUE/GREEN STRIPED (1) WHITE/BLACK STRIPED, SIZE 2 (5 METRIC) DYNACORD SUTURE, 36" (91CM) 5.5MM
Type: IMPLANTABLE DEVICE | Site: SHOULDER | Status: FUNCTIONAL
Brand: HEALIX ADVANCE BR 3 SUTURE ANCHOR W/DYNACORD

## 2024-10-24 RX ORDER — OXYCODONE HYDROCHLORIDE 5 MG/1
5 TABLET ORAL EVERY 6 HOURS PRN
Qty: 28 TABLET | Refills: 0 | Status: SHIPPED | OUTPATIENT
Start: 2024-10-24

## 2024-10-24 RX ORDER — ROCURONIUM BROMIDE 10 MG/ML
INJECTION, SOLUTION INTRAVENOUS AS NEEDED
Status: DISCONTINUED | OUTPATIENT
Start: 2024-10-24 | End: 2024-10-24

## 2024-10-24 RX ORDER — OXYCODONE HYDROCHLORIDE 5 MG/1
5 TABLET ORAL EVERY 4 HOURS PRN
Status: DISCONTINUED | OUTPATIENT
Start: 2024-10-24 | End: 2024-10-24 | Stop reason: HOSPADM

## 2024-10-24 RX ORDER — GABAPENTIN 300 MG/1
300 CAPSULE ORAL NIGHTLY
Qty: 5 CAPSULE | Refills: 0 | Status: SHIPPED | OUTPATIENT
Start: 2024-10-24 | End: 2024-10-29

## 2024-10-24 RX ORDER — ONDANSETRON HYDROCHLORIDE 2 MG/ML
4 INJECTION, SOLUTION INTRAVENOUS ONCE AS NEEDED
Status: DISCONTINUED | OUTPATIENT
Start: 2024-10-24 | End: 2024-10-24 | Stop reason: HOSPADM

## 2024-10-24 RX ORDER — ASPIRIN 325 MG
325 TABLET, DELAYED RELEASE (ENTERIC COATED) ORAL 2 TIMES DAILY
Qty: 60 TABLET | Refills: 0 | Status: SHIPPED | OUTPATIENT
Start: 2024-10-24 | End: 2024-11-23

## 2024-10-24 RX ORDER — PHENYLEPHRINE HCL IN 0.9% NACL 0.4MG/10ML
SYRINGE (ML) INTRAVENOUS AS NEEDED
Status: DISCONTINUED | OUTPATIENT
Start: 2024-10-24 | End: 2024-10-24

## 2024-10-24 RX ORDER — PROPOFOL 10 MG/ML
INJECTION, EMULSION INTRAVENOUS AS NEEDED
Status: DISCONTINUED | OUTPATIENT
Start: 2024-10-24 | End: 2024-10-24

## 2024-10-24 RX ORDER — SODIUM CHLORIDE, SODIUM LACTATE, POTASSIUM CHLORIDE, CALCIUM CHLORIDE 600; 310; 30; 20 MG/100ML; MG/100ML; MG/100ML; MG/100ML
100 INJECTION, SOLUTION INTRAVENOUS CONTINUOUS
Status: DISCONTINUED | OUTPATIENT
Start: 2024-10-24 | End: 2024-10-24 | Stop reason: HOSPADM

## 2024-10-24 RX ORDER — NAPROXEN 500 MG/1
500 TABLET ORAL 2 TIMES DAILY PRN
Qty: 60 TABLET | Refills: 0 | Status: SHIPPED | OUTPATIENT
Start: 2024-10-24 | End: 2024-10-24 | Stop reason: HOSPADM

## 2024-10-24 RX ORDER — LIDOCAINE HCL/PF 100 MG/5ML
SYRINGE (ML) INTRAVENOUS AS NEEDED
Status: DISCONTINUED | OUTPATIENT
Start: 2024-10-24 | End: 2024-10-24

## 2024-10-24 RX ORDER — MELOXICAM 15 MG/1
15 TABLET ORAL DAILY PRN
Qty: 30 TABLET | Refills: 0 | Status: SHIPPED | OUTPATIENT
Start: 2024-10-24 | End: 2024-11-23

## 2024-10-24 RX ORDER — SODIUM CHLORIDE, SODIUM LACTATE, POTASSIUM CHLORIDE, CALCIUM CHLORIDE 600; 310; 30; 20 MG/100ML; MG/100ML; MG/100ML; MG/100ML
20 INJECTION, SOLUTION INTRAVENOUS CONTINUOUS
Status: DISCONTINUED | OUTPATIENT
Start: 2024-10-24 | End: 2024-10-24 | Stop reason: HOSPADM

## 2024-10-24 RX ORDER — DIPHENHYDRAMINE HYDROCHLORIDE 50 MG/ML
12.5 INJECTION INTRAMUSCULAR; INTRAVENOUS ONCE AS NEEDED
Status: DISCONTINUED | OUTPATIENT
Start: 2024-10-24 | End: 2024-10-24 | Stop reason: HOSPADM

## 2024-10-24 RX ORDER — FENTANYL CITRATE 50 UG/ML
INJECTION, SOLUTION INTRAMUSCULAR; INTRAVENOUS AS NEEDED
Status: DISCONTINUED | OUTPATIENT
Start: 2024-10-24 | End: 2024-10-24

## 2024-10-24 RX ORDER — ALBUTEROL SULFATE 0.83 MG/ML
2.5 SOLUTION RESPIRATORY (INHALATION) ONCE AS NEEDED
Status: DISCONTINUED | OUTPATIENT
Start: 2024-10-24 | End: 2024-10-24 | Stop reason: HOSPADM

## 2024-10-24 RX ORDER — ONDANSETRON HYDROCHLORIDE 2 MG/ML
INJECTION, SOLUTION INTRAVENOUS AS NEEDED
Status: DISCONTINUED | OUTPATIENT
Start: 2024-10-24 | End: 2024-10-24

## 2024-10-24 RX ORDER — ACETAMINOPHEN 500 MG
1000 TABLET ORAL EVERY 8 HOURS PRN
Qty: 90 TABLET | Refills: 0 | Status: SHIPPED | OUTPATIENT
Start: 2024-10-24

## 2024-10-24 RX ORDER — DROPERIDOL 2.5 MG/ML
0.62 INJECTION, SOLUTION INTRAMUSCULAR; INTRAVENOUS ONCE AS NEEDED
Status: DISCONTINUED | OUTPATIENT
Start: 2024-10-24 | End: 2024-10-24 | Stop reason: HOSPADM

## 2024-10-24 RX ORDER — MEPERIDINE HYDROCHLORIDE 25 MG/ML
12.5 INJECTION INTRAMUSCULAR; INTRAVENOUS; SUBCUTANEOUS EVERY 10 MIN PRN
Status: DISCONTINUED | OUTPATIENT
Start: 2024-10-24 | End: 2024-10-24 | Stop reason: HOSPADM

## 2024-10-24 RX ORDER — CEFAZOLIN 1 G/1
INJECTION, POWDER, FOR SOLUTION INTRAVENOUS AS NEEDED
Status: DISCONTINUED | OUTPATIENT
Start: 2024-10-24 | End: 2024-10-24

## 2024-10-24 RX ORDER — MIDAZOLAM HYDROCHLORIDE 1 MG/ML
INJECTION, SOLUTION INTRAMUSCULAR; INTRAVENOUS AS NEEDED
Status: DISCONTINUED | OUTPATIENT
Start: 2024-10-24 | End: 2024-10-24

## 2024-10-24 RX ADMIN — SODIUM CHLORIDE, POTASSIUM CHLORIDE, SODIUM LACTATE AND CALCIUM CHLORIDE 20 ML/HR: 600; 310; 30; 20 INJECTION, SOLUTION INTRAVENOUS at 09:21

## 2024-10-24 SDOH — HEALTH STABILITY: MENTAL HEALTH: CURRENT SMOKER: 0

## 2024-10-24 ASSESSMENT — COLUMBIA-SUICIDE SEVERITY RATING SCALE - C-SSRS
6. HAVE YOU EVER DONE ANYTHING, STARTED TO DO ANYTHING, OR PREPARED TO DO ANYTHING TO END YOUR LIFE?: NO
2. HAVE YOU ACTUALLY HAD ANY THOUGHTS OF KILLING YOURSELF?: NO
1. IN THE PAST MONTH, HAVE YOU WISHED YOU WERE DEAD OR WISHED YOU COULD GO TO SLEEP AND NOT WAKE UP?: NO

## 2024-10-24 ASSESSMENT — PAIN SCALES - GENERAL: PAIN_LEVEL: 2

## 2024-10-24 NOTE — ANESTHESIA PREPROCEDURE EVALUATION
Patient: Brigida Phillips    Procedure Information       Anesthesia Start Date/Time: 10/24/24 1049    Procedure: REPAIR ARTHROSCOPY ROTATOR CUFF SHOULDER (Right: Shoulder) - shoulder arthroscopy, rotator cuff repair, biceps tenodesis, extensive debridement, subacromial decompression with partial acromioplasty    Location: [facility] / [facility]    Surgeons: GRACE Sanchez MD            Relevant Problems   Cardiac   (+) Arthralgia of right acromioclavicular joint   (+) HTN (hypertension)   (+) Hyperlipidemia      Pulmonary   (+) BILLIE (obstructive sleep apnea)      Neuro   (+) Chronic lumbar radiculopathy      Endocrine   (+) Diabetes mellitus, type 2 (Multi)   (+) Nontoxic multinodular goiter   (+) Obesity      Musculoskeletal   (+) Osteoarthritis of shoulder       Clinical information reviewed:   Tobacco  Allergies  Meds  Problems  Med Hx  Surg Hx   Fam Hx          NPO Detail:  NPO/Void Status  Date of Last Liquid: 10/23/24  Date of Last Solid: 10/23/24         Physical Exam    Airway  Mallampati: III  Neck ROM: full     Cardiovascular - normal exam     Dental - normal exam     Pulmonary - normal exam     Abdominal        Anesthesia Plan    History of general anesthesia?: yes  History of complications of general anesthesia?: no    ASA 3     general and regional     The patient is not a current smoker.  Patient was not previously instructed to abstain from smoking on day of procedure.  Patient did not smoke on day of procedure.    Anesthetic plan and risks discussed with patient.    Plan discussed with attending.

## 2024-10-24 NOTE — ANESTHESIA PROCEDURE NOTES
Airway  Date/Time: 10/24/2024 11:01 AM  Urgency: elective    Airway not difficult    Staffing  Performed: CRNA   Authorized by: aSji Barron MD    Performed by: JUDD Wills-MYLES  Patient location during procedure: OR    Indications and Patient Condition  Indications for airway management: anesthesia  Spontaneous Ventilation: absent  Sedation level: deep  Preoxygenated: yes  Patient position: sniffing  MILS maintained throughout  Mask difficulty assessment: 1 - vent by mask  Planned trial extubation    Final Airway Details  Final airway type: endotracheal airway      Successful airway: ETT  Cuffed: yes   Successful intubation technique: video laryngoscopy  Facilitating devices/methods: cricoid pressure  Blade size: #4  Cormack-Lehane Classification: grade IIa - partial view of glottis  Placement verified by: chest auscultation and capnometry   Cuff volume (mL): 7  Measured from: lips  ETT to lips (cm): 21  Number of attempts at approach: 1  Ventilation between attempts: BVM  Number of other approaches attempted: 0

## 2024-10-24 NOTE — DISCHARGE INSTRUCTIONS
Wound Care     Your dressing should remain intact and dry until post-op visit in the office. No showering until seen in clinic--sitting in the bathtub to sponge bathe is ok. Place a protective cover (large garbage bag) over your shoulder with sling on while bathing. Do NOT submerge your operative shoulder in bath or pool water, or get any of your dressings or sling wet.     Surgical Dressing     If your dressing becomes soiled or damp, you may remove the dressing and replace the bandage. Please do not remove steri-strips (small pieces of tape) covering your incisions. Please be certain to wash hands thoroughly prior to changing dressing, do not place any ointments over incisions.     Sling/Immobilizer     Anesthesia effects can last up until 48 hours after surgery.  Please be aware that you may experience numbness in your arm for up to 48 hours after surgery.  During this time it is extremely important that you keep your sling in place at all times because you will not have control of your arm due to the anesthesia effects.  Your sling with supporting abduction pillow should be worn at all times.  Maintain your elbow position against the pillow and even with your side or in front of this position to minimize stress on the repair.       You will be asked to keep your sling/immobilizer in place at all times during the first 4-6 weeks following surgery.  Please do not discontinue the use of the sling without clearance from Dr. Sanchez.  During the first 4-6 weeks following surgery, it is very important to limit activity with your arm to achieve optimal results from your surgery.  Never remove sling and raise arm up and down as this may cause injury to your surgically repaired shoulder.     Activity     When sleeping or resting, inclined positions (i.e. reclining chair) using a pillow under the forearm for support may provide better comfort  Do not engage in activities which increase pain/swelling over the first 7-10  days following surgery  Avoid long periods of sitting (without arm supported) or long distance traveling for 2 weeks  May return to ONLY sedentary work  3-4 days after surgery, if pain is tolerable     Continuous icing will help to decrease swelling and provide pain relief.  You may use ice packs every 2 hours for 20 minutes daily until your first post-operative visit.  It is very important to always have protection between the ice pad and your skin.  Never place the ice pad directly on your skin; this could lead to an injury to your skin.  If the ice causes increased pain, skin rash or irritation, discontinue its use and call the office.      Driving     Please do not drive until you are evaluated in the office after surgery.  You are considered an impaired  following surgery, and if you choose to drive, your insurance may not cover any damages that may occur.     Post-operative Medication     1.  Aspirin 325mg 1 tablet twice a day for 4 weeks following surgery.  Aspirin helps to reduce the risk of blood clots following surgery.      2. Colace 1 tablet twice a day when needed for constipation while taking pain medication.     3.  Mobic 15 mg take 1 tablet daily for 5 days, after 5 days take 1 tablet daily only as needed for pain.     4.Tylenol 1gm every 8 hours for 5 days, after 5 days take 1gm every 8 hours only as needed for pain.     5.Gabapentin 300mg daily at bedtime for 5 days, after 5 days you should no longer need this medication.     6. Doxycycline 100mg 1 tablet twice daily for 5 days or 30 days as prescription is written     7. Oxy IR 5mg every 6 hours ONLY TO BE TAKEN FOR SEVERE PAIN.  Do NOT take Oxy IR within 3 hours of taking Gabapentin. Do NOT take Gabapentin within 3 hours of taking Oxy IR. If you are having severe pain and taking Oxy IR at night skip the nightly Gabapentin and only resume when you are not taking Oxy IR at night     Signs and Symptoms of Complications     Although  complications are rare, the following are a list of potential symptoms you should be alert for.    Infection - Increased pain not relieved with medication, fever (temperature of 101.5 degrees Fahrenheit or higher), chills, redness, swelling or drainage (yellow/brown/green) from incision.  Blood Clot - If you experience shortness of breath, chest pain, pain in your chest with deep breaths or difficulty breathing, please report to emergency room immediately.   Persistent Pain - Severe sharp pain not relieved by pain medication.  Persistent and increasing swelling and numbness of the arm.        If a follow-up visit after surgery was not scheduled, please call Dr. Sanchez's office at [phone] during office business hours (Monday to Friday, 8:30am to 3:30pm) to arrange a follow-up appointment for 2 weeks after your surgery.     If you have any questions, please call Dr. Sanchez's office at [phone] during office business hours (Monday to Friday, 8:30am to 3:30pm).  Please do not call Dr. Sanchez's office after 3:30pm or on weekends or holidays.  If you have an urgent issue after 3:30pm or on weekends or holidays, please go immediately to a local emergency room to be evaluated.

## 2024-10-24 NOTE — ANESTHESIA PROCEDURE NOTES
Peripheral Block    Patient location during procedure: pre-op  Start time: 10/24/2024 10:25 AM  End time: 10/24/2024 10:35 AM  Reason for block: at surgeon's request and post-op pain management  Staffing  Performed: attending   Authorized by: Saji Barron MD    Performed by: Saji Barron MD  Preanesthetic Checklist  Completed: patient identified, IV checked, site marked, risks and benefits discussed, surgical consent, monitors and equipment checked, pre-op evaluation and timeout performed   Timeout performed at: 10/24/2024 10:00 AM  Peripheral Block  Patient position: laying flat  Prep: ChloraPrep and site prepped and draped  Patient monitoring: continuous pulse ox, heart rate and cardiac monitor  Block type: interscalene  Laterality: right  Injection technique: single-shot  Guidance: nerve stimulator and ultrasound guided  Local infiltration: lidocaine  Infiltration strength: 1 %  Dose: 3 mL  Needle  Needle gauge: 20 G  Needle localization: ultrasound guidance     image stored in chart  Assessment  Injection assessment: negative aspiration for heme, local visualized surrounding nerve on ultrasound, no paresthesia on injection, incremental injection and transient paresthesias  Paresthesia pain: none  Heart rate change: no  Slow fractionated injection: yes  Additional Notes  R Interscalene Block  Time out verbal and written consent  Sterile tech, wide prep w chlorhex  US guided  Lido local   Efrem 0.375% + epi 1:200K+ decadron 5 mg+ tetracaine 20mg  30 ml  No Complications    Versed 3 mg

## 2024-10-24 NOTE — OP NOTE
REPAIR ARTHROSCOPY ROTATOR CUFF SHOULDER (R) Operative Note     Date: 10/24/2024  OR Location: [facility] OR    Name: Brigida Phillips, : 1960, Age: 64 y.o., MRN: [id number], Sex: female    ORTHOPEDIC OPERATIVE REPORT / POST-OP NOTE    SURGEON:  Satinder Sanchez MD  ASSISTANT(S):  Anne-Marie Guzman PA-C, Priyanka Dunn MD  PRE-OPERATIVE DIAGNOSIS: Right shoulder full-thickness rotator cuff tear, biceps tendinopathy, acromioclavicular arthrosis, labral tearing, subacromial impingement  POST-OPERATIVE DIAGNOSIS:  Same  PROCEDURE: Right shoulder arthroscopy, arthroscopic rotator cuff repair of supraspinatus and subscapularis, arthroscopic biceps tenodesis, arthroscopic distal clavicle excision, extensive debridement, subacromial decompression with partial acromioplasty  CPT CODE(S):  00763, 95919, 08230, 54577, 99469  ANESTHESIA:  general + regional  ESTIMATED BLOOD LOSS: Minimal  SPECIMEN:  None  FINDINGS:  Above  COMPLICATIONS:  None  CONDITION:  Stable to the Recovery Room    I, Dr Sanchez, was present and scrubbed for the entire surgical procedure, including wound closure.         INDICATION FOR SURGERY:  64-year-old female with longstanding right shoulder pain and weakness.  We treated the patient's shoulder pain and weakness conservatively with anti-inflammatories, physical therapy, and steroid injections.  The patient failed to have significant relief.  X-rays showed a relatively normal shoulder with no significant arthritis of the glenohumeral joint but the patient did have some acromioclavicular joint arthritis on the xrays and MRI showed a full-thickness rotator cuff tear in the shoulder, as well as proximal biceps tendinopathy, acromioclavicular joint arthrosis, labral tearing and subacromial impingement.  On physical examination, the patient had pain and weakness with rotator cuff testing, positive impingement signs, and pain over their proximal biceps tendon.  The patient also had tenderness to palpation over  the distal clavicle with a positive cross arm test.  We discussed continuing conservative treatment but this had not been helping and was tried already.  We discussed a shoulder arthroscopy with rotator cuff repair, arthroscopic biceps tenodesis, arthroscopic distal clavicle excision, extensive debridement and subacromial decompression with partial acromioplasty.  The patient understood all the risks versus benefits of operative and non-operative treatment options.  The risks of shoulder arthroscopy with rotator cuff repair were discussed, which included but were not limited to: risk of continued pain or re-tear of the rotator cuff, risks of infection, bleeding, nerve, artery, or muscle damage, risk of fracture either intra-operatively or post-operatively, risk of need for additional surgery, risk of anesthesia including risks of heart attack, stroke, or even death.  The patient wanted to proceed with surgery and signed appropriate surgical consents.  On the morning of surgery, I signed the patient's operative shoulder the preoperative holding area.      PROCEDURE:  The patient was brought to the operating room after anesthesia performed a block on the patient's operative upper extremity.  The patient was placed supine on the operating room table and all of their bony prominences were padded.  A operating room huddle was performed and the patient received IV antibiotics.  The patient was placed into a beachchair position with all of their corie prominences padded and SCDs were applied to the lower extremities and used throughout the procedure.   The patient's right upper extremity was prepped and draped in the normal sterile fashion.  A pre-incision timeout was called.  A shoulder arthroscopy was then performed, using the standard posterior viewing portal, anterior, anterolateral, and lateral working portals.  Throughout the shoulder arthroscopy, no loose bodies were identified.   In the glenohumeral joint, there  was grade 3 cartilage damage in the central aspect of the humeral head.  There was some grade 2 cartilage wear of the glenoid.  The patient was found to have extensive labral tearing, with anterior and posterior labral tearing, as well as a SLAP tear.  The biceps tendon was found to be erythematous and significantly torn.  The biceps tendon was then tagged with a spinal needle and cut at its insertion on the labrum.  Scar tissue was seen throughout the anterior capsule.  An extensive debridement was then performed with an arthroscopic shaver, debriding the subacromial bursa as well as the labrum tearing anteriorly and posteriorly.  The biceps anchor complex tearing was debrided down to normal healthy labrum.  The biceps tendon stump was then debrided down to normal healthy labrum.  The glenoid cartilage wear was debrided with the arthroscopic shaver.  The humeral head cartilage wear was debrided with the arthroscopic shaver.  Due to the grade 3 cartilage damage in the central aspect the humeral head the decision was made to proceed with a microfracture into the subchondral bone and the humeral head in order to create bleeding that could hopefully fill the defect with scar tissue.  A microfracture was performed drilling holes 3 to 4 mm deep and 3 to 4 mm apart and the humeral head and good blood flow seen coming from the microfracture holes.  Scarring in the anterior capsule was debrided with the arthroscopic shaver and the anterior interval was opened up with the shaver.      The subscapularis tendon was then examined.  The subscapularis was completely torn from the insertion on the lesser tuberosity and retracted.  The subscapularis tearing was debrided down to healthy bleeding tissue and the footprint of the subscapularis was debrided down to healthy bleeding bone.  An all-suture anchor was impacted in the footprint of the subscapularis.  The sutures were passed through the subscapularis tendon and tied down in a  horizontal mattress configuration, completing the subscapularis rotator cuff repair.  After this was performed, a probe was used and the rotator cuff repair of the subscapularis was found to be extremely stable.      The supraspinatus tendon was then examined.  A full-thickness medium retracted tear of the supraspinatus tendon was identified.  The torn tissue of the supraspinatus tendon was then debrided back to healthy bleeding tissue.    The arthroscope was then put in the subacromial bursa and a subacromial decompression was performed.  A very prominent hook was found on the anterolateral aspect of the acromion, so a partial acromioplasty was performed using a liza in the cutting block technique.  The biceps tendon was pulled in the subacromial space and freed up down to the bottom of the bicipital groove, where an 8 mm tunnel was drilled.  The biceps tendon was pushed into the tunnel and secured with interference screw, completing the arthroscopic biceps tenodesis.      Attention was then turned to the torn supraspinatus tendon.  The unhealthy supraspinatus tendon tissue was debrided back to healthy, bleeding tendon tissue with an arthroscopic shaver and the footprint of the supraspinatus was debrided down to healthy bleeding bone.  A supraspinatus rotator cuff repair was then performed using a double row rotator cuff repair technique with 2 medial row anchors with the sutures being brought up through the rotator cuff in a horizontal mattress configuration and tied down.  All of the suture limbs were then brought laterally through 2 lateral row anchors for a double row repair and a total of 4 anchors for the supraspinatus rotator cuff repair.  After this was performed, a probe was used and the rotator cuff repair of the supraspinatus was found to be extremely stable.  The shoulder was also put through a full range of motion and the rotator cuff repair was found to be extremely stable.    Attention was turned to  the distal clavicle and AC joint.  Significant arthrosis was seen at the acromioclavicular joint so 1 cm of distal clavicle was removed with a liza through the anterior portal.  After this was performed, the scope was put through the anterior portal, confirming that the bone had been removed superiorly as well as posteriorly and that there was 1 cm of space between the distal clavicle and the acromion.  This completed the arthroscopic distal clavicle excision.    After the shoulder arthroscopy was completed, including the rotator cuff repair, biceps tenodesis, arthroscopic distal clavicle excision, extensive debridement, and subacromial decompression with partial acromioplasty, a complete shoulder arthroscopy was then again performed and no further pathology was identified.  The shoulder was drained of fluid and the arthroscopic portals were closed with 3-0 nylon skin sutures.  Adaptic, dry sterile dressing, and a shoulder immobilizer were applied.  The patient was awoken and brought to the recovery room in good condition.  There were no complications.

## 2024-10-25 NOTE — ANESTHESIA POSTPROCEDURE EVALUATION
Patient: Brigida Phillips    Procedure Summary       Date: 10/24/24 Room / Location: [facility] / [facility]    Anesthesia Start: 1049 Anesthesia Stop: 1336    Procedure: REPAIR ARTHROSCOPY ROTATOR CUFF SHOULDER (Right: Shoulder) Diagnosis:       Complete tear of right rotator cuff, unspecified whether traumatic      Biceps tendinitis of right shoulder      Impingement syndrome of right shoulder      Labral tear of shoulder, right, initial encounter      Arthralgia of right acromioclavicular joint      (Complete tear of right rotator cuff, unspecified whether traumatic [M75.121])      (Biceps tendinitis of right shoulder [M75.21])      (Impingement syndrome of right shoulder [M75.41])      (Labral tear of shoulder, right, initial encounter [S43.431A])      (Arthralgia of right acromioclavicular joint [M25.511])    Surgeons: GRACE Sanchez MD Responsible Provider: Saji Barron MD    Anesthesia Type: general, regional ASA Status: 3            Anesthesia Type: general, regional    Vitals Value Taken Time   /71 10/24/24 1405   Temp 36 °C (96.8 °F) 10/24/24 1335   Pulse 65 10/24/24 1405   Resp 20 10/24/24 1405   SpO2 95 % 10/24/24 1405       Anesthesia Post Evaluation    Patient location during evaluation: PACU  Patient participation: complete - patient participated  Level of consciousness: awake  Pain score: 2  Pain management: adequate  Multimodal analgesia pain management approach  Airway patency: patent  Two or more strategies used to mitigate risk of obstructive sleep apnea  Cardiovascular status: acceptable  Respiratory status: acceptable  Hydration status: acceptable  Postoperative Nausea and Vomiting: none    No notable events documented.

## 2024-10-26 RX ORDER — INSULIN GLARGINE 100 [IU]/ML
12 INJECTION, SOLUTION SUBCUTANEOUS NIGHTLY
Qty: 15 ML | Refills: 3 | Status: SHIPPED | OUTPATIENT
Start: 2024-10-26

## 2024-10-26 RX ORDER — PEN NEEDLE, DIABETIC 32GX 5/32"
NEEDLE, DISPOSABLE MISCELLANEOUS
Qty: 100 EACH | Refills: 3 | Status: SHIPPED | OUTPATIENT
Start: 2024-10-26

## 2024-10-28 ENCOUNTER — APPOINTMENT (OUTPATIENT)
Dept: ORTHOPEDIC SURGERY | Facility: CLINIC | Age: 64
End: 2024-10-28

## 2024-11-06 ENCOUNTER — APPOINTMENT (OUTPATIENT)
Dept: ORTHOPEDIC SURGERY | Facility: CLINIC | Age: 64
End: 2024-11-06

## 2024-11-06 VITALS — HEIGHT: 68 IN | WEIGHT: 250 LBS | BODY MASS INDEX: 37.89 KG/M2

## 2024-11-06 DIAGNOSIS — Z98.890 S/P RIGHT ROTATOR CUFF REPAIR: Primary | ICD-10-CM

## 2024-11-06 DIAGNOSIS — M67.813 BICEPS TENDONOSIS OF RIGHT SHOULDER: ICD-10-CM

## 2024-11-06 PROCEDURE — 99024 POSTOP FOLLOW-UP VISIT: CPT

## 2024-11-06 PROCEDURE — 3008F BODY MASS INDEX DOCD: CPT

## 2024-11-06 ASSESSMENT — PAIN - FUNCTIONAL ASSESSMENT: PAIN_FUNCTIONAL_ASSESSMENT: 0-10

## 2024-11-06 ASSESSMENT — PAIN SCALES - GENERAL: PAINLEVEL_OUTOF10: 3

## 2024-11-07 NOTE — PROGRESS NOTES
History of Present Illness  Chief Complaint   Patient presents with    Right Shoulder - Post-op       64 y.o. female is 2 weeks s/p right shoulder scope with rotator cuff repair and biceps tenodesis. The patient has no complaints today and states they are doing well.  Their pain is well controlled on oral pain medications.  They have not had any chest pain, shortness of breath, fevers, chills, or calf tenderness.  The pain from their surgery is gradually improving and they have not had any drainage from the wounds, redness, or any other constitutional symptoms (fevers, chills, feeling 'under the weather', etc.).- Denies any radicular symptoms. No concern with incision. Continues to wear the sling at all times.  They state pain is well controlled and continuing to improve. State they are continuing to progress well.    Review of Systems   GENERAL: Negative for malaise, significant weight loss, fever, chills  MUSCULOSKELETAL: see HPI  NEURO:  Negative    Exam  Right shoulder incisions are clean dry intact well-healed. No evidence of infection today. Stitches were removed in the office today. Axillary nerve intact.  strength is equal bilaterally. No tenderness to palpation on exam today. Mild ecchymosis noted on the anterior aspect that appears to be healing.   SILT. UE is NVI.    Assessment  Patient is 2 weeks s/p right shoulder scope with rotator cuff repair and biceps tenodesis.      Plan  Overall, the patient is doing very well after their surgery and is pleased with their progress. Today we removed the stitches and applied Steri-Strips. It is okay for the patient to shower but avoid soaking the wounds (no pools or bathtubs) for 3-4 more weeks until the wounds are completely healed.  Patient should continue to use the sling until she is 6 weeks postoperative. Can take over the counter tylenol or NSAIDs as needed for pain.  We gave the patient a prescription for physical therapy to work on range of motion and  strengthening of the extremity.  We will plan on seeing the patient back in 8 more weeks. I told the patient to call with any questions or problems.

## 2024-11-11 ENCOUNTER — HOSPITAL ENCOUNTER (OUTPATIENT)
Dept: RADIOLOGY | Facility: HOSPITAL | Age: 64
Discharge: HOME | End: 2024-11-11
Payer: MEDICARE

## 2024-11-11 ENCOUNTER — APPOINTMENT (OUTPATIENT)
Dept: ORTHOPEDIC SURGERY | Facility: CLINIC | Age: 64
End: 2024-11-11
Payer: COMMERCIAL

## 2024-11-11 VITALS — WEIGHT: 250 LBS | HEIGHT: 68 IN | BODY MASS INDEX: 37.89 KG/M2

## 2024-11-11 DIAGNOSIS — S62.614B DISPLACED FRACTURE OF PROXIMAL PHALANX OF RIGHT RING FINGER, INITIAL ENCOUNTER FOR OPEN FRACTURE: ICD-10-CM

## 2024-11-11 PROCEDURE — 99024 POSTOP FOLLOW-UP VISIT: CPT | Performed by: ORTHOPAEDIC SURGERY

## 2024-11-11 PROCEDURE — 3008F BODY MASS INDEX DOCD: CPT | Performed by: ORTHOPAEDIC SURGERY

## 2024-11-11 PROCEDURE — 73130 X-RAY EXAM OF HAND: CPT | Mod: RT

## 2024-11-11 ASSESSMENT — PAIN - FUNCTIONAL ASSESSMENT: PAIN_FUNCTIONAL_ASSESSMENT: 0-10

## 2024-11-11 ASSESSMENT — PAIN SCALES - GENERAL: PAINLEVEL_OUTOF10: 1

## 2024-11-12 NOTE — PROGRESS NOTES
History of Present Illness  Chief Complaint   Patient presents with    Right Wrist - Post-op, Pain     10/16/24 R 4 th MC shaft FX and R R finger prox phalanx fracture \       Patient has continued use of Thermoplast splint.  Doing regular hand exercises.     Examination  Right hand:  Incisions are well healed. Sensation grossly intact distally.  Capillary refill less than 2 seconds.  Good rotational alignment of the ring finger.  No crepitus with MCP motion.  2 cm DPC.    Right hand radiographs ordered and available for my review demonstrate maintained alignment of fourth metacarpal shaft and ring finger proximal phalanx fracture.  Hardware intact.     Assessment:  Patient status post right fourth metacarpal and right ring finger proximal phalanx operative fixation     Plan  Patient will continue scar tissue massage.  Continue active mobilization with therapy guided exercises.  May transition to odilia taping of middle and ring fingers.  Follow-up in 4 weeks with new right hand radiographs and likely advance weightbearing at that time.

## 2024-11-13 ENCOUNTER — EVALUATION (OUTPATIENT)
Dept: OCCUPATIONAL THERAPY | Facility: CLINIC | Age: 64
End: 2024-11-13
Payer: MEDICARE

## 2024-11-13 DIAGNOSIS — S62.614B DISPLACED FRACTURE OF PROXIMAL PHALANX OF RIGHT RING FINGER, INITIAL ENCOUNTER FOR OPEN FRACTURE: ICD-10-CM

## 2024-11-13 DIAGNOSIS — G89.29 CHRONIC RIGHT SHOULDER PAIN: ICD-10-CM

## 2024-11-13 DIAGNOSIS — M25.611 SHOULDER STIFFNESS, RIGHT: Primary | ICD-10-CM

## 2024-11-13 DIAGNOSIS — M25.511 CHRONIC RIGHT SHOULDER PAIN: ICD-10-CM

## 2024-11-13 DIAGNOSIS — M79.641 HAND PAIN, RIGHT: ICD-10-CM

## 2024-11-13 PROCEDURE — 97165 OT EVAL LOW COMPLEX 30 MIN: CPT | Mod: GO

## 2024-11-13 PROCEDURE — 97110 THERAPEUTIC EXERCISES: CPT | Mod: GO

## 2024-11-13 ASSESSMENT — PAIN - FUNCTIONAL ASSESSMENT: PAIN_FUNCTIONAL_ASSESSMENT: 0-10

## 2024-11-13 NOTE — PROGRESS NOTES
Patient Name: Brigida Phillips  MRN: [id number]  Today's Date: 11/13/2024  Time Calculation  Start Time: 0800  Stop Time: 0843  Time Calculation (min): 43 min      Assessment:Pt demonstrates limited ROM and strength. This is resulting in limited participation in pain-free ADLs and inability to perform at the prior level of function. Patient will benefit from skilled Occupational Therapy services to address impairments noted in eval.          Plan: Planned Interventions include: therapeutic exercise, therapeutic activity, self-care home management, manual therapy, therapeutic activities, neuromuscular coordination, electric stimulation, ultrasound, kinesiotaping,and IASTM.     Insurance:  Visit number: 1 of 1  Authorization info: 30 visits for shld; accident related for hand  Insurance Type: [place]    Subjective:   Chief Complaint: R RCR with bicep tenodesis; R MC/ P1 fracture (Ring)  Onset: Sx Shld 10-24-24; Hand 10-16-24  VIVIEN: Chronic shld, accident hand  DOI/DOS: Shld 10-24-24; Hand 10-16-24  Current Problem  1. Shoulder stiffness, right  Follow Up In Occupational Therapy      2. Displaced fracture of proximal phalanx of right ring finger, initial encounter for open fracture  Referral to Occupational Therapy      3. Hand pain, right  Follow Up In Occupational Therapy      4. Chronic right shoulder pain  Follow Up In Occupational Therapy          General:  General  Reason for Referral: R shoulder arthroscopy, rotator cuff repair, biceps tenodesis, extensive debridement, subacromial decompression with partial acromioplasty; R Ring finger Metacarpal and P1 fracture  Referred By: Dr. Waite, Dr sanchez.  General Comment: Visit 1, 30 visits     Pt presented this date without sling and was actively moving her elbow.     Precautions: Limited weight bearing for R hand No passive motion for 2 weeks. R shoulder follow Dr. Sanchez's RCR protocol.   Precautions  Precautions Comment: Limited weightbearing for right hand and  shoulder.    Medical History Form: Reviewed (scanned into chart)    Prior Functional Level    Prior Function Per Pt/Caregiver Report  Level of [place]: Independent with ADLs and functional transfers, Independent with homemaking with ambulation  Hand Dominance: Right     Current Functional level    ADL  ADL Comments: Pt reports compensation with all ADL's. Pain with typing    Home Set-Up  Home Living  Type of Home: House  Lives With: Spouse  Home Access: No concerns    Sensation  Sensation  Sensation Comment: numbness at incisions of R hand.     PAIN  Pain Assessment  Pain Assessment: 0-10  0-10 (Numeric) Pain Score:  (R hand pain 3/10; R shoulder 3/10)     Functional Rating Scale  Quick Dash: 65.91      Shoulder AROM  L Shoulder flexion: (180°): 145  L Shoulder Extension: (60°): 61  L Shoulder abduction: (180°): 135  L shoulder ER: (90°): 57  L shoulder IR: (70°): T10  Shoulder PROM  R shoulder flexion: (180°): 67    Hand Assessment       Right Hand AROM:   R Index  MCP 0-90 (degrees): 46 Degrees  R Index PIP 0-100 (degrees): 86 Degrees  R Index DIP 0-70 (degrees): 40 Degrees  R Long  MCP 0-90 (degrees): 36 Degrees  R Long PIP 0-100 (degrees): 88 Degrees  R Long DIP 0-70 (degrees): 55 Degrees  R Ring  MCP 0-90 (degrees): 30 Degrees  R Ring PIP 0-100 (degrees): 59 Degrees  R Ring DIP 0-70 (degrees): 29 Degrees  R Little  MCP 0-90 (degrees): 46 Degrees  R Little PIP 0-100 (degrees): 61 Degrees  R Little DIP 0-70 (degrees): 39 Degrees    Left Hand AROM:  L Ring  MCP 0-90 (degrees): 75 Degrees  L Ring PIP 0-100 (degrees): 100 Degrees  L Ring DIP 0-70 (degrees): 76 Degrees  L Little  MCP 0-90 (degrees): 89 Degrees  L Little PIP 0-100 (degrees): 61 Degrees  L Little DIP 0-70 (degrees): 80 Degrees            Therapeutic Exercise:   min  24 MINUTES  Therapeutic Exercise  Therapeutic Exercise Activity 1: Reverse blocking at PIP joint with MP flexed to 90 x 10  Therapeutic Exercise Activity 2: R RF DIP/PIP blocking x  10  Therapeutic Exercise Activity 3: Elbow flexion x 10  Therapeutic Exercise Activity 4: Elbow ext x 10  Therapeutic Exercise Activity 5: Scapular retraction x 10  Therapeutic Exercise Activity 6: Shoulder rolls x 10  Therapeutic Exercise Activity 7: AAER x 10  Therapeutic Exercise Activity 8: AA table slide flexion x 10      Therapeutic Activity:        MINUTES  Therapeutic Activity  Therapeutic Activity 1: Pt fitted with odilia loops for R hand    Education Pt educated on newly added HEP this date.   Education  Individual(s) Educated: Patient  Education Provided: Risk and benefits of OT discussed with patient or other, POC discussed and agreed upon  Home Program: Handout issued  Diagnosis and Precautions: Pt presented to evaluation without wearing sling. Pt educated on her Doctors protocol and the need for wearing sling for 6 weeks post op. Pt also educated that she is not to move elbow actively until 6 weeks.  Risk and Benefits Discussed with Patient/Caregiver/Other: yes  Patient/Caregiver Demonstrated Understanding: yes  Plan of Care Discussed and Agreed Upon: yes  Patient Response to Education: Patient/Caregiver Verbalized Understanding of Information, Patient/Caregiver Performed Return Demonstration of Exercises/Activities, Patient/Caregiver Asked Appropriate Questions       Efrain Abarca, OT      Active       OT Goals       Right Shoulder ROM goals:   Flexion 125  Degrees , Abduction 125  Degrees  , Extension  55 Degrees  , Internal Rotation  T12  , External Rotation  50 Degrees        Start:  11/13/24    Expected End:  05/13/25            Patient will improve strengthening in order to perform self-care, household, work and or leisure tasks, Right Shoulder Strength Goal: Flexion /5 , Abduction /5  , Extension /5  ,Internal Rotation  /5 , External Rotation /5   4+/5 globally       Start:  11/13/24    Expected End:  05/13/25            Patient will improve hand strength in order to perform self-care,  household, work and or leisure tasks Right  Hand Strength Goal;     40      #, Key Pinch   15      #, 3 Jaw Jai  15       #         Start:  11/13/24    Expected End:  05/13/25            Patient will improve finger ROM in order to perform self-care, household, work and or leisure tasks, Right Finger ROM Goal (Total Active Motion)  Index 230  Degrees, Ring 230     Degrees, Long  230 Degrees, Small  230  Degrees.        Start:  11/13/24    Expected End:  05/13/25            Pt will demonstrate in improve in function by improving to 20.0 on the quick dash to signify increased independence with ADL'S with decreased pain.         Start:  11/13/24    Expected End:  05/13/25               OT Problem       PATIENT WILL DEMONSTRATE INDEPENDENCE IN HOME PROGRAM FOR SUPPORT OF PROGRESSION       Start:  11/13/24    Expected End:  05/13/25            PATIENT WILL REPORT PAIN OF 0-2/10 DEMONSTRATING A REDUCTION OF OVERALL PAIN       Start:  11/13/24    Expected End:  05/13/25              Active       OT Goals       Right Shoulder ROM goals:   Flexion 125  Degrees , Abduction 125  Degrees  , Extension  55 Degrees  , Internal Rotation  T12  , External Rotation  50 Degrees        Start:  11/13/24    Expected End:  05/13/25            Patient will improve strengthening in order to perform self-care, household, work and or leisure tasks, Right Shoulder Strength Goal: Flexion /5 , Abduction /5  , Extension /5  ,Internal Rotation  /5 , External Rotation /5   4+/5 globally       Start:  11/13/24    Expected End:  05/13/25            Patient will improve hand strength in order to perform self-care, household, work and or leisure tasks Right  Hand Strength Goal;     40      #, Key Pinch   15      #, 3 Jaw Jai  15       #         Start:  11/13/24    Expected End:  05/13/25            Patient will improve finger ROM in order to perform self-care, household, work and or leisure tasks, Right Finger ROM Goal (Total Active Motion)   Index 230  Degrees, Ring 230     Degrees, Long  230 Degrees, Small  230  Degrees.        Start:  11/13/24    Expected End:  05/13/25            Pt will demonstrate in improve in function by improving to 20.0 on the quick dash to signify increased independence with ADL'S with decreased pain.         Start:  11/13/24    Expected End:  05/13/25               OT Problem       PATIENT WILL DEMONSTRATE INDEPENDENCE IN HOME PROGRAM FOR SUPPORT OF PROGRESSION       Start:  11/13/24    Expected End:  05/13/25            PATIENT WILL REPORT PAIN OF 0-2/10 DEMONSTRATING A REDUCTION OF OVERALL PAIN       Start:  11/13/24    Expected End:  05/13/25

## 2024-11-15 ENCOUNTER — TREATMENT (OUTPATIENT)
Dept: OCCUPATIONAL THERAPY | Facility: CLINIC | Age: 64
End: 2024-11-15
Payer: MEDICARE

## 2024-11-15 DIAGNOSIS — G89.29 CHRONIC RIGHT SHOULDER PAIN: ICD-10-CM

## 2024-11-15 DIAGNOSIS — M25.611 SHOULDER STIFFNESS, RIGHT: ICD-10-CM

## 2024-11-15 DIAGNOSIS — M79.641 HAND PAIN, RIGHT: ICD-10-CM

## 2024-11-15 DIAGNOSIS — M25.511 CHRONIC RIGHT SHOULDER PAIN: ICD-10-CM

## 2024-11-15 PROCEDURE — 97035 APP MDLTY 1+ULTRASOUND EA 15: CPT | Mod: GO

## 2024-11-15 PROCEDURE — 97140 MANUAL THERAPY 1/> REGIONS: CPT | Mod: GO

## 2024-11-15 PROCEDURE — 97110 THERAPEUTIC EXERCISES: CPT | Mod: GO

## 2024-11-15 NOTE — PROGRESS NOTES
Occupational Therapy  Occupational Therapy Treatment    Patient Name: Brigida Phillips  MRN: [id number]  Today's Date: 1/17/2025  Time Calculation  Start Time: 1116  Stop Time: 1155  Time Calculation (min): 39 min           General Comment: Visit 2, 30 visits    Assessment: Tissue mobility increased , Joint mobility/ROM increased, flexibility increased, muscle fatigue increased.    Pt completed session with some difficulty.           Plan: Progress with POC, As tolerated and monitor home program.        Current Problem  1. Hand pain, right  Follow Up In Occupational Therapy          Precautions R SHOULDER post op, R hand post op.        Subjective:   Patient reports I am good.     Pain   0/10 hand    Performing HEP?: Yes    Objective:   All exercises held for 10 seconds unless noted otherwise   Treatments:   This therapist instructed and demonstrated interventions to patient, patient completed the following under direct supervision of this therapist:  Modalities:        8MINUTES  3.3 MHz .8 W c/m2 for 8 minutes to in preparation for ROM/strength exercises   To volar and dorsal radial hand/digits.    Therapeutic Exercise:   min  38 MINUTES  Therapeutic Exercise  Therapeutic Exercise Activity 9: PIP blocking to digits 3-5  Therapeutic Exercise Activity 10: DIP blocking to digits 3-5  Therapeutic Exercise Activity 11: Digit abd/add x 10  Therapeutic Exercise Activity 12: Tendon glides x 10  Therapeutic Exercise Activity 13: intrinsic minus towel stretch x 10    Manual Therapy:         MINUTES  Manual Therapy  Manual Therapy Performed: Yes  Manual Therapy Activity 1: Joint mobs to PIP for digits 3-5 x 10 with MP flexed to 90  Manual Therapy Activity 2: Digit distraction x 10 RF/FL        Education: Pt educated to keep the wrist straight with completion of tendon glides. Pt educated on tabletop intrinsic minus stretch.       Efrain Abarca, OT, CHT    Active       OT Goals       Patient will improve hand strength in order  to perform self-care, household, work and or leisure tasks Right  Hand Strength Goal;     40      #, Key Pinch   15      #, 3 Jaw Jai  15       #         Start:  11/13/24    Expected End:  05/13/25            Patient will improve finger ROM in order to perform self-care, household, work and or leisure tasks, Right Finger ROM Goal (Total Active Motion)  Index 230  Degrees, Ring 230     Degrees, Long  230 Degrees, Small  230  Degrees.        Start:  11/13/24    Expected End:  05/13/25            Pt will demonstrate in improve in function by improving to 20.0 on the quick dash to signify increased independence with ADL'S with decreased pain.         Start:  11/13/24    Expected End:  05/13/25               OT Problem       PATIENT WILL DEMONSTRATE INDEPENDENCE IN HOME PROGRAM FOR SUPPORT OF PROGRESSION       Start:  11/13/24    Expected End:  05/13/25            PATIENT WILL REPORT PAIN OF 0-2/10 DEMONSTRATING A REDUCTION OF OVERALL PAIN       Start:  11/13/24    Expected End:  05/13/25

## 2024-11-16 DIAGNOSIS — I10 HYPERTENSION, UNSPECIFIED TYPE: ICD-10-CM

## 2024-11-18 RX ORDER — CARVEDILOL 6.25 MG/1
TABLET ORAL
Qty: 180 TABLET | Refills: 0 | Status: SHIPPED | OUTPATIENT
Start: 2024-11-18

## 2024-11-20 ENCOUNTER — TREATMENT (OUTPATIENT)
Dept: OCCUPATIONAL THERAPY | Facility: CLINIC | Age: 64
End: 2024-11-20
Payer: COMMERCIAL

## 2024-11-20 DIAGNOSIS — G89.29 CHRONIC RIGHT SHOULDER PAIN: ICD-10-CM

## 2024-11-20 DIAGNOSIS — M25.511 CHRONIC RIGHT SHOULDER PAIN: ICD-10-CM

## 2024-11-20 DIAGNOSIS — M25.611 SHOULDER STIFFNESS, RIGHT: ICD-10-CM

## 2024-11-20 PROCEDURE — 97140 MANUAL THERAPY 1/> REGIONS: CPT | Mod: GO

## 2024-11-20 PROCEDURE — 97110 THERAPEUTIC EXERCISES: CPT | Mod: GO

## 2024-11-20 NOTE — PROGRESS NOTES
Occupational Therapy  Occupational Therapy Treatment    Patient Name: Brigida Phillips  MRN: [id number]  Today's Date: 11/20/2024  Time Calculation  Start Time: 1217  Stop Time: 1256  Time Calculation (min): 39 min           General Comment: Visit 2, 30 visits    Assessment: Tissue mobility increased , Joint mobility/ROM increased, flexibility increased.        Pt completed session with some difficulty.           Plan: Progress with POC, As tolerated and monitor home program.        Current Problem  1. Chronic right shoulder pain  Follow Up In Occupational Therapy      2. Shoulder stiffness, right  Follow Up In Occupational Therapy          Precautions EXERCISE PROTOCOL  10 Days- 4 Weeks  Moist Heat.  Joint Mobilization: Grade II-III (out of 5), emphasizing caudal glides and distraction to increase patients pain free PROM.  Pulley exercises in flexion and abduction (scapular plane) with back to door (20 times).  ER with WAND: Shoulder at 0 degrees abduction, elbow at 90 degrees.  Encourage patient to eat and perform other activities of daily living that are primarily elbow ROM with affected arm.  Start Isometric Internal Rotation and External Rotation.    Week Four (4)  Start WAND exercises in supine flexion and standing abduction.    Week Six (6)  Start AROM Flexion and Abduction to 90 degrees.  Start Towel stretch-IR.  Start-UBE machine (Upper Body Ergometer-Arm Bike) at 90 RPM's for five (5) minutes.  Start Rubberband internal/external rotation.  Start gentle hanging stretch if tolerable.  Progress to ER stretch at 90 degrees abduction.    2 Months  Progress UBE machine (Upper Body Ergometer-Arm Bike) to 60 RPM workload levels.  Start PNF's D1 & D2 with yellow theratubing (Neuromuscular patterns)  Start Scapula Stabilization Exercises- Prone Extension & Retraction or in standing with theratubing.    2.5 Months  Start Endurance Repetitions flexion and abduction. Start with thirty (30) reps-progress to 50.    3  Months  Start on Nautilus Training.  Can Start on underhand ground tennis strokes.  Can start gold-chip & putt.  Can start Aerobic Classes- gradually bring arms in at ten (10) minute increments.    4 Months  Can start tossing of a baseball.  Can start driving & gradual return to playing golf-start with only a few holes and add three (3) at a time.         Subjective:   Patient reports Its a little sore.      Pain   3-10  Performing HEP?: Yes    Objective:   All exercises held for 10 seconds unless noted otherwise   Treatments:   This therapist instructed and demonstrated interventions to patient, patient completed the following under direct supervision of this therapist:      Therapeutic Exercise:   min  31 MINUTES  Therapeutic Exercise  Therapeutic Exercise Activity 3: Elbow flexion x 10  Therapeutic Exercise Activity 5: Scapular retraction x 10  Therapeutic Exercise Activity 6: Shoulder rolls x 10  Therapeutic Exercise Activity 7: AAER x 10  Therapeutic Exercise Activity 9: AA  EXT x 10  Therapeutic Exercise Activity 10: Supine punch up clasped hand flexion x 10  Therapeutic Exercise Activity 11: ER self isometric x 10    Manual Therapy:       8 MINUTES  Manual Therapy  Manual Therapy Performed: Yes  Manual Therapy Activity 1: Trigger release to rhomboids, infraspinatus, deltoid and lateral elbow.        Education: Cues to assist with elbow flexion with LUE. Pt educated on supine punch up, AAEXT, and ER isometric.       Efrain Abarca, OT, CHT    Active       OT Goals       Right Shoulder ROM goals:   Flexion 125  Degrees , Abduction 125  Degrees  , Extension  55 Degrees  , Internal Rotation  T12  , External Rotation  50 Degrees        Start:  11/20/24    Expected End:  05/20/25            Patient will improve strengthening in order to perform self-care, household, work and or leisure tasks, Right Shoulder Strength Goal: Flexion /5 , Abduction /5  , Extension /5  ,Internal Rotation  /5 , External Rotation /5    4+/5 globally       Start:  11/20/24    Expected End:  05/20/25            Pt will demonstrate in improve in function by improving to 20.0 on the quick dash to signify increased independence with ADL'S with decreased pain.         Start:  11/20/24    Expected End:  05/20/25               OT Problem       PATIENT WILL DEMONSTRATE INDEPENDENCE IN HOME PROGRAM FOR SUPPORT OF PROGRESSION       Start:  11/20/24    Expected End:  05/20/25            PATIENT WILL REPORT PAIN OF 0-2/10 DEMONSTRATING A REDUCTION OF OVERALL PAIN       Start:  11/20/24    Expected End:  05/20/25

## 2024-11-25 ENCOUNTER — TREATMENT (OUTPATIENT)
Dept: OCCUPATIONAL THERAPY | Facility: CLINIC | Age: 64
End: 2024-11-25
Payer: COMMERCIAL

## 2024-11-25 ENCOUNTER — APPOINTMENT (OUTPATIENT)
Dept: PULMONOLOGY | Facility: CLINIC | Age: 64
End: 2024-11-25
Payer: COMMERCIAL

## 2024-11-25 DIAGNOSIS — M25.511 CHRONIC RIGHT SHOULDER PAIN: ICD-10-CM

## 2024-11-25 DIAGNOSIS — G89.29 CHRONIC RIGHT SHOULDER PAIN: ICD-10-CM

## 2024-11-25 DIAGNOSIS — M25.611 SHOULDER STIFFNESS, RIGHT: ICD-10-CM

## 2024-11-25 PROCEDURE — 97110 THERAPEUTIC EXERCISES: CPT | Mod: GO

## 2024-11-25 NOTE — PROGRESS NOTES
Occupational Therapy  Occupational Therapy Treatment    Patient Name: Brigida Phillips  MRN: [id number]  Today's Date: 11/25/2024  Time Calculation  Start Time: 1533  Stop Time: 1616  Time Calculation (min): 43 min           General Comment: Visit 3, 30 visits    Assessment: increased, flexibility increased, impingement pain decreased,  muscle fatigue increased.    Pt completed session with some difficulty.           Plan: Progress with POC, As tolerated and monitor home program.        Current Problem  1. Chronic right shoulder pain  Follow Up In Occupational Therapy      2. Shoulder stiffness, right  Follow Up In Occupational Therapy          Precautions  R RCR with bicep.       Subjective:   Patient reports I wake up with shoulder pain.     Pain   0/10 at rest.     Performing HEP?: Yes    Objective:   All exercises held for 10 seconds unless noted otherwise   Treatments:   This therapist instructed and demonstrated interventions to patient, patient completed the following under direct supervision of this therapist:      Therapeutic Exercise:   min  43 MINUTES  Therapeutic Exercise  Therapeutic Exercise Activity 1: Extrinsic ext stretch x 10  Therapeutic Exercise Activity 3: Pulley flexion x 10  Therapeutic Exercise Activity 4: Pulley scaption x 10  Therapeutic Exercise Activity 5: Scapular retraction x 10  Therapeutic Exercise Activity 6: Shoulder rolls x 10  Therapeutic Exercise Activity 7: AAER x 10  Therapeutic Exercise Activity 9: AA  EXT x 10  Therapeutic Exercise Activity 10: dowel punch up clasped hand flexion x 10  Therapeutic Exercise Activity 11: ER self isometric x 10          Education: Pt educated on supine dowel flexion with distraction at shoulder to avoid compensation. Pt educated on scaption and pulley flexion.        Efrain Abarca, OT, CHT    Active       OT Goals       Right Shoulder ROM goals:   Flexion 125  Degrees , Abduction 125  Degrees  , Extension  55 Degrees  , Internal Rotation  T12  ,  External Rotation  50 Degrees        Start:  11/20/24    Expected End:  05/20/25            Patient will improve strengthening in order to perform self-care, household, work and or leisure tasks, Right Shoulder Strength Goal: Flexion /5 , Abduction /5  , Extension /5  ,Internal Rotation  /5 , External Rotation /5   4+/5 globally       Start:  11/20/24    Expected End:  05/20/25            Pt will demonstrate in improve in function by improving to 20.0 on the quick dash to signify increased independence with ADL'S with decreased pain.         Start:  11/20/24    Expected End:  05/20/25               OT Problem       PATIENT WILL DEMONSTRATE INDEPENDENCE IN HOME PROGRAM FOR SUPPORT OF PROGRESSION       Start:  11/20/24    Expected End:  05/20/25            PATIENT WILL REPORT PAIN OF 0-2/10 DEMONSTRATING A REDUCTION OF OVERALL PAIN       Start:  11/20/24    Expected End:  05/20/25

## 2024-11-27 ENCOUNTER — APPOINTMENT (OUTPATIENT)
Dept: PRIMARY CARE | Facility: CLINIC | Age: 64
End: 2024-11-27

## 2024-11-27 VITALS
HEIGHT: 68 IN | OXYGEN SATURATION: 100 % | DIASTOLIC BLOOD PRESSURE: 74 MMHG | HEART RATE: 72 BPM | BODY MASS INDEX: 36.68 KG/M2 | WEIGHT: 242 LBS | SYSTOLIC BLOOD PRESSURE: 124 MMHG

## 2024-11-27 DIAGNOSIS — N64.89 HEMATOMA OF RIGHT BREAST: ICD-10-CM

## 2024-11-27 DIAGNOSIS — E11.9 TYPE 2 DIABETES MELLITUS TREATED WITH INSULIN (MULTI): ICD-10-CM

## 2024-11-27 DIAGNOSIS — Z79.4 TYPE 2 DIABETES MELLITUS TREATED WITH INSULIN (MULTI): ICD-10-CM

## 2024-11-27 DIAGNOSIS — E78.49 OTHER HYPERLIPIDEMIA: ICD-10-CM

## 2024-11-27 DIAGNOSIS — Z11.59 ENCOUNTER FOR HEPATITIS C SCREENING TEST FOR LOW RISK PATIENT: ICD-10-CM

## 2024-11-27 DIAGNOSIS — I10 PRIMARY HYPERTENSION: ICD-10-CM

## 2024-11-27 DIAGNOSIS — G47.33 OSA (OBSTRUCTIVE SLEEP APNEA): ICD-10-CM

## 2024-11-27 DIAGNOSIS — Z12.11 COLON CANCER SCREENING: ICD-10-CM

## 2024-11-27 DIAGNOSIS — Z12.39 ENCOUNTER FOR BREAST CANCER SCREENING USING NON-MAMMOGRAM MODALITY: ICD-10-CM

## 2024-11-27 DIAGNOSIS — N28.89 LEFT KIDNEY MASS: ICD-10-CM

## 2024-11-27 DIAGNOSIS — Z23 ENCOUNTER FOR IMMUNIZATION: ICD-10-CM

## 2024-11-27 DIAGNOSIS — Z00.00 ENCOUNTER FOR PREVENTIVE HEALTH EXAMINATION: Primary | ICD-10-CM

## 2024-11-27 DIAGNOSIS — E04.2 MULTIPLE THYROID NODULES: ICD-10-CM

## 2024-11-27 PROCEDURE — 3074F SYST BP LT 130 MM HG: CPT

## 2024-11-27 PROCEDURE — 90656 IIV3 VACC NO PRSV 0.5 ML IM: CPT

## 2024-11-27 PROCEDURE — 99396 PREV VISIT EST AGE 40-64: CPT

## 2024-11-27 PROCEDURE — 3078F DIAST BP <80 MM HG: CPT

## 2024-11-27 PROCEDURE — 3008F BODY MASS INDEX DOCD: CPT

## 2024-11-27 PROCEDURE — 90471 IMMUNIZATION ADMIN: CPT

## 2024-11-27 ASSESSMENT — PATIENT HEALTH QUESTIONNAIRE - PHQ9
1. LITTLE INTEREST OR PLEASURE IN DOING THINGS: NOT AT ALL
2. FEELING DOWN, DEPRESSED OR HOPELESS: NOT AT ALL
SUM OF ALL RESPONSES TO PHQ9 QUESTIONS 1 AND 2: 0
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
## 2024-11-27 NOTE — PROGRESS NOTES
I reviewed and examined the patient. I was present for the key exam elements, and I fully participated in the patient's care. I discussed the management of the care with the resident. I have personally reviewed the pertinent labs and imaging, as well as recent notes, with the patient. I have reviewed the note above and agree with the resident's medical decision making as documented in the resident's note, in addition to the following comments / findings:     Agree with the rest of the plan outlined below by resident physician. No red flags.      The patient understands and agrees to the assessment and plan of care. Patient has also agreed to follow up and comply with the treatment and evaluation as recommended today. Patient was instructed to call the office at [phone] should questions arise regarding their treatment or care.     Jean Paul Hightower DO, FAOASM  Family Medicine   [address], [address]  [place] [address]     Jean Paul Hightower DO

## 2024-12-02 ENCOUNTER — LAB (OUTPATIENT)
Dept: LAB | Facility: LAB | Age: 64
End: 2024-12-02
Payer: COMMERCIAL

## 2024-12-02 DIAGNOSIS — Z00.00 ENCOUNTER FOR PREVENTIVE HEALTH EXAMINATION: ICD-10-CM

## 2024-12-02 DIAGNOSIS — Z11.59 ENCOUNTER FOR HEPATITIS C SCREENING TEST FOR LOW RISK PATIENT: ICD-10-CM

## 2024-12-02 LAB
25(OH)D3 SERPL-MCNC: 38 NG/ML (ref 30–100)
ALBUMIN SERPL BCP-MCNC: 4.3 G/DL (ref 3.4–5)
ALP SERPL-CCNC: 70 U/L (ref 33–136)
ALT SERPL W P-5'-P-CCNC: 19 U/L (ref 7–45)
ANION GAP SERPL CALC-SCNC: 11 MMOL/L (ref 10–20)
AST SERPL W P-5'-P-CCNC: 21 U/L (ref 9–39)
BILIRUB SERPL-MCNC: 0.7 MG/DL (ref 0–1.2)
BUN SERPL-MCNC: 17 MG/DL (ref 6–23)
CALCIUM SERPL-MCNC: 9.3 MG/DL (ref 8.6–10.3)
CHLORIDE SERPL-SCNC: 106 MMOL/L (ref 98–107)
CHOLEST SERPL-MCNC: 147 MG/DL (ref 0–199)
CHOLESTEROL/HDL RATIO: 6.3
CO2 SERPL-SCNC: 29 MMOL/L (ref 21–32)
CREAT SERPL-MCNC: 0.83 MG/DL (ref 0.5–1.05)
EGFRCR SERPLBLD CKD-EPI 2021: 79 ML/MIN/1.73M*2
ERYTHROCYTE [DISTWIDTH] IN BLOOD BY AUTOMATED COUNT: 14.6 % (ref 11.5–14.5)
GLUCOSE SERPL-MCNC: 86 MG/DL (ref 74–99)
HCT VFR BLD AUTO: 44.2 % (ref 36–46)
HCV AB SER QL: NONREACTIVE
HDLC SERPL-MCNC: 23.4 MG/DL
HGB BLD-MCNC: 13.3 G/DL (ref 12–16)
LDLC SERPL CALC-MCNC: 86 MG/DL
MCH RBC QN AUTO: 26.7 PG (ref 26–34)
MCHC RBC AUTO-ENTMCNC: 30.1 G/DL (ref 32–36)
MCV RBC AUTO: 89 FL (ref 80–100)
NON HDL CHOLESTEROL: 124 MG/DL (ref 0–149)
NRBC BLD-RTO: 0 /100 WBCS (ref 0–0)
PLATELET # BLD AUTO: 228 X10*3/UL (ref 150–450)
POTASSIUM SERPL-SCNC: 4.1 MMOL/L (ref 3.5–5.3)
PROT SERPL-MCNC: 7.2 G/DL (ref 6.4–8.2)
RBC # BLD AUTO: 4.98 X10*6/UL (ref 4–5.2)
SODIUM SERPL-SCNC: 142 MMOL/L (ref 136–145)
TRIGL SERPL-MCNC: 188 MG/DL (ref 0–149)
VLDL: 38 MG/DL (ref 0–40)
WBC # BLD AUTO: 5 X10*3/UL (ref 4.4–11.3)

## 2024-12-02 PROCEDURE — 85027 COMPLETE CBC AUTOMATED: CPT

## 2024-12-02 PROCEDURE — 80061 LIPID PANEL: CPT

## 2024-12-02 PROCEDURE — 86803 HEPATITIS C AB TEST: CPT

## 2024-12-02 PROCEDURE — 80053 COMPREHEN METABOLIC PANEL: CPT

## 2024-12-02 PROCEDURE — 36415 COLL VENOUS BLD VENIPUNCTURE: CPT

## 2024-12-02 PROCEDURE — 82306 VITAMIN D 25 HYDROXY: CPT

## 2024-12-03 ENCOUNTER — TREATMENT (OUTPATIENT)
Dept: OCCUPATIONAL THERAPY | Facility: CLINIC | Age: 64
End: 2024-12-03
Payer: COMMERCIAL

## 2024-12-03 DIAGNOSIS — M25.611 SHOULDER STIFFNESS, RIGHT: ICD-10-CM

## 2024-12-03 DIAGNOSIS — G89.29 CHRONIC RIGHT SHOULDER PAIN: ICD-10-CM

## 2024-12-03 DIAGNOSIS — M25.511 CHRONIC RIGHT SHOULDER PAIN: ICD-10-CM

## 2024-12-03 PROCEDURE — 97140 MANUAL THERAPY 1/> REGIONS: CPT | Mod: GO

## 2024-12-03 PROCEDURE — 97110 THERAPEUTIC EXERCISES: CPT | Mod: GO

## 2024-12-03 ASSESSMENT — PAIN DESCRIPTION - DESCRIPTORS: DESCRIPTORS: ACHING

## 2024-12-03 ASSESSMENT — PAIN - FUNCTIONAL ASSESSMENT: PAIN_FUNCTIONAL_ASSESSMENT: 0-10

## 2024-12-03 ASSESSMENT — PAIN SCALES - GENERAL: PAINLEVEL_OUTOF10: 3

## 2024-12-03 NOTE — PROGRESS NOTES
Occupational Therapy    Occupational Therapy Treatment    Patient Name: Brigida Phillips  MRN: [id number]  Today's Date: 1/21/2025  Time Calculation  Start Time: 1449  Stop Time: 1543  Time Calculation (min): 54 min      OT Received On: 11/25/24    General Comment: Visit 4, 30 visits, onset sx, 10/24    Assessment:  Joint mobility/ROM increased, flexibility increased, muscle fatigue increased, muscle guarding.    Pt completed session with some difficulty.           Plan: Progress with POC, As tolerated and monitor home program.        Current Problem  1. Chronic right shoulder pain  Follow Up In Occupational Therapy      2. Shoulder stiffness, right  Follow Up In Occupational Therapy          Precautions Precautions  Precautions Comment:  Pulley exercises in flexion and abduction (scapular plane) with back to door (20 times).   ER AAROM with wand  ROM with affected arm.   Start Isometric Internal Rotation and External Rotation.    Week Four (4)   Start WAND exercises in supine flexion and standing abduction.    Subjective:   Patient reports     Pain  Pain Assessment: 0-10  0-10 (Numeric) Pain Score: 3  Pain Type: Chronic pain  Pain Location: Shoulder  Pain Orientation: Right  Pain Descriptors: Aching  Pain Frequency: Intermittent  Pain Onset: Awakened from sleep*    Performing HEP?: Yes    Objective:   All exercises held for 10 seconds unless noted otherwise   Treatments:   This therapist instructed and demonstrated interventions to patient, patient completed the following under direct supervision of this therapist:      Therapeutic Exercise:   min    MINUTES  Therapeutic Exercise  Therapeutic Exercise Performed: Yes  Therapeutic Exercise Activity 1: self ROM flexion 10 reps  Therapeutic Exercise Activity 2: self ROM Er 10 reps  Therapeutic Exercise Activity 3: AAROM shoulder flexion 10 reps wiht dowel ben  Therapeutic Exercise Activity 4: AAROM supine shoulder ER with dowel ben  Therapeutic Exercise Activity 5: AAROM  sidelying ER 10 reps  Therapeutic Exercise Activity 6: AAROM abduction 10 reps  Therapeutic Exercise Activity 7: AAROM sidelying elbow 90 degrees abduction 10 reps  Therapeutic Exercise Activity 8: pulley with AAROM in flexion 10 reps  Therapeutic Exercise Activity 9: pulley with AAROM wiht abduction 10 reps  Therapeutic Exercise Activity 10: AAROM abduction 10 reps  Therapeutic Exercise Activity 11: self ISO ER 10 reps  Therapeutic Exercise Activity 12: self ISO IR 10 reps    Manual Therapy:         MINUTES  Manual Therapy  Manual Therapy Performed: Yes  Manual Therapy Activity 1: trigger release for supraspinatus, tracey minor,        Education:      Courtney Resendiz, OT,    Active       OT Goals       Right Shoulder ROM goals:   Flexion 125  Degrees , Abduction 125  Degrees  , Extension  55 Degrees  , Internal Rotation  T12  , External Rotation  50 Degrees        Start:  11/20/24    Expected End:  05/20/25            Patient will improve strengthening in order to perform self-care, household, work and or leisure tasks, Right Shoulder Strength Goal: Flexion /5 , Abduction /5  , Extension /5  ,Internal Rotation  /5 , External Rotation /5   4+/5 globally       Start:  11/20/24    Expected End:  05/20/25            Pt will demonstrate in improve in function by improving to 20.0 on the quick dash to signify increased independence with ADL'S with decreased pain.         Start:  11/20/24    Expected End:  05/20/25               OT Problem       PATIENT WILL DEMONSTRATE INDEPENDENCE IN HOME PROGRAM FOR SUPPORT OF PROGRESSION       Start:  11/20/24    Expected End:  05/20/25            PATIENT WILL REPORT PAIN OF 0-2/10 DEMONSTRATING A REDUCTION OF OVERALL PAIN       Start:  11/20/24    Expected End:  05/20/25

## 2024-12-09 ENCOUNTER — APPOINTMENT (OUTPATIENT)
Dept: RADIOLOGY | Facility: HOSPITAL | Age: 64
End: 2024-12-09
Payer: COMMERCIAL

## 2024-12-09 DIAGNOSIS — N64.89 HEMATOMA OF RIGHT BREAST: Primary | ICD-10-CM

## 2024-12-09 DIAGNOSIS — Z12.39 ENCOUNTER FOR BREAST CANCER SCREENING USING NON-MAMMOGRAM MODALITY: ICD-10-CM

## 2024-12-11 ENCOUNTER — HOSPITAL ENCOUNTER (OUTPATIENT)
Dept: RADIOLOGY | Facility: HOSPITAL | Age: 64
Discharge: HOME | End: 2024-12-11
Payer: COMMERCIAL

## 2024-12-11 ENCOUNTER — TREATMENT (OUTPATIENT)
Dept: OCCUPATIONAL THERAPY | Facility: CLINIC | Age: 64
End: 2024-12-11
Payer: MEDICARE

## 2024-12-11 ENCOUNTER — TREATMENT (OUTPATIENT)
Dept: OCCUPATIONAL THERAPY | Facility: CLINIC | Age: 64
End: 2024-12-11
Payer: COMMERCIAL

## 2024-12-11 DIAGNOSIS — M25.511 CHRONIC RIGHT SHOULDER PAIN: ICD-10-CM

## 2024-12-11 DIAGNOSIS — M25.611 SHOULDER STIFFNESS, RIGHT: ICD-10-CM

## 2024-12-11 DIAGNOSIS — N28.89 LEFT KIDNEY MASS: ICD-10-CM

## 2024-12-11 DIAGNOSIS — G89.29 CHRONIC RIGHT SHOULDER PAIN: ICD-10-CM

## 2024-12-11 DIAGNOSIS — M25.611 SHOULDER STIFFNESS, RIGHT: Primary | ICD-10-CM

## 2024-12-11 DIAGNOSIS — M79.641 HAND PAIN, RIGHT: ICD-10-CM

## 2024-12-11 DIAGNOSIS — M79.641 HAND PAIN, RIGHT: Primary | ICD-10-CM

## 2024-12-11 PROCEDURE — 97140 MANUAL THERAPY 1/> REGIONS: CPT | Mod: GO

## 2024-12-11 PROCEDURE — 97110 THERAPEUTIC EXERCISES: CPT | Mod: GO

## 2024-12-11 PROCEDURE — 2550000001 HC RX 255 CONTRASTS

## 2024-12-11 PROCEDURE — 74170 CT ABD WO CNTRST FLWD CNTRST: CPT

## 2024-12-11 PROCEDURE — 74170 CT ABD WO CNTRST FLWD CNTRST: CPT | Performed by: RADIOLOGY

## 2024-12-11 NOTE — PROGRESS NOTES
Occupational Therapy  Occupational Therapy Treatment    Patient Name: Brigida Phillips  MRN: [id number]  Today's Date: 12/11/2024  Time Calculation  Start Time: 0930  Stop Time: 1012  Time Calculation (min): 42 min           General Comment: Visit 5, post op, onset sx, 10/24    Assessment: Joint mobility/ROM increased, flexibility increased, muscle fatigue increased. Pt educated to increase hold with supine flexion and sidelying bent arm abd.     Pt completed session with some difficulty.           Plan: Progress with POC, As tolerated and monitor home program. ER band next session.        Current Problem  1. Shoulder stiffness, right        2. Chronic right shoulder pain            Precautions Post op RCR, 7 weeks tomorrow   EXERCISE PROTOCOL  10 Days- 4 Weeks  Moist Heat.  Joint Mobilization: Grade II-III (out of 5), emphasizing caudal glides and distraction to increase patients pain free PROM.  Pulley exercises in flexion and abduction (scapular plane) with back to door (20 times).  ER with WAND: Shoulder at 0 degrees abduction, elbow at 90 degrees.  Encourage patient to eat and perform other activities of daily living that are primarily elbow ROM with affected arm.  Start Isometric Internal Rotation and External Rotation.    Week Four (4)  Start WAND exercises in supine flexion and standing abduction.    Week Six (6)  Start AROM Flexion and Abduction to 90 degrees.  Start Towel stretch-IR.  Start-UBE machine (Upper Body Ergometer-Arm Bike) at 90 RPM's for five (5) minutes.  Start Rubberband internal/external rotation.  Start gentle hanging stretch if tolerable.  Progress to ER stretch at 90 degrees abduction.    2 Months  Progress UBE machine (Upper Body Ergometer-Arm Bike) to 60 RPM workload levels.  Start PNF's D1 & D2 with yellow theratubing (Neuromuscular patterns)  Start Scapula Stabilization Exercises- Prone Extension & Retraction or in standing with theratubing.    2.5 Months  Start Endurance Repetitions  flexion and abduction. Start with thirty (30) reps-progress to 50.    3 Months  Start on Nautilus Training.  Can Start on underhand ground tennis strokes.  Can start gold-chip & putt.  Can start Aerobic Classes- gradually bring arms in at ten (10) minute increments.    4 Months  Can start tossing of a baseball.  Can start driving & gradual return to playing golf-start with only a few holes and add three (3) at a time.     Subjective:   Patient reports I don't really have any pain.     Pain   0/10    Performing HEP?: Yes    Objective:   All exercises held for 10 seconds unless noted otherwise   Treatments:   This therapist instructed and demonstrated interventions to patient, patient completed the following under direct supervision of this therapist:      Therapeutic Exercise:   min   MINUTES  Therapeutic Exercise  Therapeutic Exercise Activity 1: Shoulder rolls x 10  Therapeutic Exercise Activity 2: Scapular retraction x 10  Therapeutic Exercise Activity 3: AAER x 10  Therapeutic Exercise Activity 4: AROM ER x 10  Therapeutic Exercise Activity 5: Pendulum x 10 3#  Therapeutic Exercise Activity 6: Supine dowel flexion x 10 ( shoulder width)  Therapeutic Exercise Activity 7: Supine flexion straight arm to 90 deg x 10  Therapeutic Exercise Activity 8: sidelying IR x 10  Therapeutic Exercise Activity 9: Sidelying bent arm abd x 10          Education: Pt educated on AROM ER, Ext, supine flexion and sidelying bent arm abd.       Efrain Abarca, OT, CHT    Active       OT Goals       Right Shoulder ROM goals:   Flexion 125  Degrees , Abduction 125  Degrees  , Extension  55 Degrees  , Internal Rotation  T12  , External Rotation  50 Degrees        Start:  11/20/24    Expected End:  05/20/25            Patient will improve strengthening in order to perform self-care, household, work and or leisure tasks, Right Shoulder Strength Goal: Flexion /5 , Abduction /5  , Extension /5  ,Internal Rotation  /5 , External Rotation /5    4+/5 globally       Start:  11/20/24    Expected End:  05/20/25            Pt will demonstrate in improve in function by improving to 20.0 on the quick dash to signify increased independence with ADL'S with decreased pain.         Start:  11/20/24    Expected End:  05/20/25               OT Problem       PATIENT WILL DEMONSTRATE INDEPENDENCE IN HOME PROGRAM FOR SUPPORT OF PROGRESSION       Start:  11/20/24    Expected End:  05/20/25            PATIENT WILL REPORT PAIN OF 0-2/10 DEMONSTRATING A REDUCTION OF OVERALL PAIN       Start:  11/20/24    Expected End:  05/20/25
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
## 2024-12-12 ENCOUNTER — OFFICE VISIT (OUTPATIENT)
Dept: PULMONOLOGY | Facility: CLINIC | Age: 64
End: 2024-12-12
Payer: COMMERCIAL

## 2024-12-12 VITALS
BODY MASS INDEX: 36.49 KG/M2 | WEIGHT: 240 LBS | HEART RATE: 80 BPM | OXYGEN SATURATION: 97 % | DIASTOLIC BLOOD PRESSURE: 80 MMHG | SYSTOLIC BLOOD PRESSURE: 133 MMHG | RESPIRATION RATE: 16 BRPM

## 2024-12-12 DIAGNOSIS — R06.00 DYSPNEA, UNSPECIFIED TYPE: Primary | ICD-10-CM

## 2024-12-12 DIAGNOSIS — G47.33 OSA ON CPAP: ICD-10-CM

## 2024-12-12 PROCEDURE — 3048F LDL-C <100 MG/DL: CPT | Performed by: INTERNAL MEDICINE

## 2024-12-12 PROCEDURE — 99214 OFFICE O/P EST MOD 30 MIN: CPT | Performed by: INTERNAL MEDICINE

## 2024-12-12 PROCEDURE — 3079F DIAST BP 80-89 MM HG: CPT | Performed by: INTERNAL MEDICINE

## 2024-12-12 PROCEDURE — 3075F SYST BP GE 130 - 139MM HG: CPT | Performed by: INTERNAL MEDICINE

## 2024-12-12 ASSESSMENT — PATIENT HEALTH QUESTIONNAIRE - PHQ9
1. LITTLE INTEREST OR PLEASURE IN DOING THINGS: NOT AT ALL
SUM OF ALL RESPONSES TO PHQ9 QUESTIONS 1 AND 2: 0
2. FEELING DOWN, DEPRESSED OR HOPELESS: NOT AT ALL

## 2024-12-12 ASSESSMENT — COLUMBIA-SUICIDE SEVERITY RATING SCALE - C-SSRS
6. HAVE YOU EVER DONE ANYTHING, STARTED TO DO ANYTHING, OR PREPARED TO DO ANYTHING TO END YOUR LIFE?: NO
1. IN THE PAST MONTH, HAVE YOU WISHED YOU WERE DEAD OR WISHED YOU COULD GO TO SLEEP AND NOT WAKE UP?: NO
2. HAVE YOU ACTUALLY HAD ANY THOUGHTS OF KILLING YOURSELF?: NO

## 2024-12-12 ASSESSMENT — ENCOUNTER SYMPTOMS: DEPRESSION: 0

## 2024-12-12 ASSESSMENT — PAIN SCALES - GENERAL: PAINLEVEL_OUTOF10: 0-NO PAIN
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
## 2024-12-16 ENCOUNTER — TREATMENT (OUTPATIENT)
Dept: OCCUPATIONAL THERAPY | Facility: CLINIC | Age: 64
End: 2024-12-16
Payer: COMMERCIAL

## 2024-12-16 DIAGNOSIS — G89.29 CHRONIC RIGHT SHOULDER PAIN: ICD-10-CM

## 2024-12-16 DIAGNOSIS — M79.641 HAND PAIN, RIGHT: ICD-10-CM

## 2024-12-16 DIAGNOSIS — M25.511 CHRONIC RIGHT SHOULDER PAIN: ICD-10-CM

## 2024-12-16 DIAGNOSIS — M25.611 SHOULDER STIFFNESS, RIGHT: Primary | ICD-10-CM

## 2024-12-16 DIAGNOSIS — M25.611 SHOULDER STIFFNESS, RIGHT: ICD-10-CM

## 2024-12-16 PROCEDURE — 97110 THERAPEUTIC EXERCISES: CPT | Mod: GO

## 2024-12-16 PROCEDURE — 97035 APP MDLTY 1+ULTRASOUND EA 15: CPT | Mod: GO

## 2024-12-16 NOTE — PROGRESS NOTES
Occupational Therapy  Occupational Therapy Treatment    Patient Name: Brigida Phillips  MRN: [id number]  Today's Date: 12/16/2024  Time Calculation  Start Time: 1101  Stop Time: 1141  Time Calculation (min): 40 min           General Comment: Visit 6/30    Assessment:  Joint mobility/ROM increased, flexibility increased, muscle fatigue increased.  Pt completed session with some difficulty.           Plan: Progress with POC, As tolerated and monitor home program.        Current Problem  1. Shoulder stiffness, right        2. Chronic right shoulder pain            Precautions  EXERCISE PROTOCOL  10 Days- 4 Weeks  Moist Heat.  Joint Mobilization: Grade II-III (out of 5), emphasizing caudal glides and distraction to increase patients pain free PROM.  Pulley exercises in flexion and abduction (scapular plane) with back to door (20 times).  ER with WAND: Shoulder at 0 degrees abduction, elbow at 90 degrees.  Encourage patient to eat and perform other activities of daily living that are primarily elbow ROM with affected arm.  Start Isometric Internal Rotation and External Rotation.    Week Four (4)  Start WAND exercises in supine flexion and standing abduction.    Week Six (6)  Start AROM Flexion and Abduction to 90 degrees.  Start Towel stretch-IR.  Start-UBE machine (Upper Body Ergometer-Arm Bike) at 90 RPM's for five (5) minutes.  Start Rubberband internal/external rotation.  Start gentle hanging stretch if tolerable.  Progress to ER stretch at 90 degrees abduction.    2 Months  Progress UBE machine (Upper Body Ergometer-Arm Bike) to 60 RPM workload levels.  Start PNF's D1 & D2 with yellow theratubing (Neuromuscular patterns)  Start Scapula Stabilization Exercises- Prone Extension & Retraction or in standing with theratubing.    2.5 Months  Start Endurance Repetitions flexion and abduction. Start with thirty (30) reps-progress to 50.    3 Months  Start on Nautilus Training.  Can Start on underhand ground tennis strokes.  Can  start gold-chip & putt.  Can start Aerobic Classes- gradually bring arms in at ten (10) minute increments.    4 Months  Can start tossing of a baseball.  Can start driving & gradual return to playing golf-start with only a few holes and add three (3) at a time.        Subjective:   Patient reports No discomfort     Pain   0/10  Performing HEP?: Yes    Objective:   All exercises held for 10 seconds unless noted otherwise   Treatments:   This therapist instructed and demonstrated interventions to patient, patient completed the following under direct supervision of this therapist:      Therapeutic Exercise:   min  40 MINUTES  Therapeutic Exercise  Therapeutic Exercise Activity 1: Shoulder rolls x 10  Therapeutic Exercise Activity 2: Scapular retraction x 10  Therapeutic Exercise Activity 3: AAER x 10  Therapeutic Exercise Activity 4: AROM ER x 10  Therapeutic Exercise Activity 5: AAEXT x 10  Therapeutic Exercise Activity 6: AROM Ext x 10  Therapeutic Exercise Activity 7: Supine flexion with dowel x 10, then without x 10  Therapeutic Exercise Activity 8: sidelying IR x 10  Therapeutic Exercise Activity 9: Sidelying bent arm abd x 10  Therapeutic Exercise Activity 10: AAIR up back x 10  Therapeutic Exercise Activity 11: Pendulum 3#  Therapeutic Exercise Activity 12: Standing flexion less than 90 x 10    Manual Therapy:         MINUTES  Manual Therapy  Manual Therapy Activity 1: Trigger release to anterior deltoid/ bicep x 2 spots for decreased pain.  Manual Therapy Activity 2: Scapulohumeral stretch x 5 with supine dowel flexion.  Billed under exercise.       Education: Pt educated on completing T-band ER, AA IR up back, and Standing flexion less than 90 deg.        Efrain Abarca, OT, CHT    Active       OT Goals       Right Shoulder ROM goals:   Flexion 125  Degrees , Abduction 125  Degrees  , Extension  55 Degrees  , Internal Rotation  T12  , External Rotation  50 Degrees        Start:  11/20/24    Expected End:   05/20/25            Patient will improve strengthening in order to perform self-care, household, work and or leisure tasks, Right Shoulder Strength Goal: Flexion /5 , Abduction /5  , Extension /5  ,Internal Rotation  /5 , External Rotation /5   4+/5 globally       Start:  11/20/24    Expected End:  05/20/25            Pt will demonstrate in improve in function by improving to 20.0 on the quick dash to signify increased independence with ADL'S with decreased pain.         Start:  11/20/24    Expected End:  05/20/25               OT Problem       PATIENT WILL DEMONSTRATE INDEPENDENCE IN HOME PROGRAM FOR SUPPORT OF PROGRESSION       Start:  11/20/24    Expected End:  05/20/25            PATIENT WILL REPORT PAIN OF 0-2/10 DEMONSTRATING A REDUCTION OF OVERALL PAIN       Start:  11/20/24    Expected End:  05/20/25
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
## 2024-12-18 ENCOUNTER — TREATMENT (OUTPATIENT)
Dept: OCCUPATIONAL THERAPY | Facility: CLINIC | Age: 64
End: 2024-12-18
Payer: COMMERCIAL

## 2024-12-18 DIAGNOSIS — M25.511 CHRONIC RIGHT SHOULDER PAIN: ICD-10-CM

## 2024-12-18 DIAGNOSIS — M79.641 HAND PAIN, RIGHT: ICD-10-CM

## 2024-12-18 DIAGNOSIS — M25.611 SHOULDER STIFFNESS, RIGHT: ICD-10-CM

## 2024-12-18 DIAGNOSIS — G89.29 CHRONIC RIGHT SHOULDER PAIN: ICD-10-CM

## 2024-12-18 DIAGNOSIS — M25.611 SHOULDER STIFFNESS, RIGHT: Primary | ICD-10-CM

## 2024-12-18 PROCEDURE — 97140 MANUAL THERAPY 1/> REGIONS: CPT | Mod: GO

## 2024-12-18 PROCEDURE — 97110 THERAPEUTIC EXERCISES: CPT | Mod: GO

## 2024-12-18 ASSESSMENT — PAIN SCALES - GENERAL: PAINLEVEL_OUTOF10: 0 - NO PAIN
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
## 2024-12-18 NOTE — PROGRESS NOTES
Occupational Therapy  Occupational Therapy Treatment    Patient Name: Brigida Phillips  MRN: [id number]  Today's Date: 12/18/2024  Time Calculation  Start Time: 0759  Stop Time: 0845  Time Calculation (min): 46 min           General Comment: Visit 7/30    Assessment: Tissue mobility increased , Joint mobility/ROM increased, flexibility increased, muscle fatigue increased.    Pt completed session with some difficulty.           Plan: Progress with POC, As tolerated and monitor home program. Continue with [place]  and STM       Current Problem  1. Shoulder stiffness, right  Follow Up In Occupational Therapy      2. Hand pain, right        3. Chronic right shoulder pain  Follow Up In Occupational Therapy          Precautions Post-op       Subjective:   Patient reports Its really just the bicep. Pt reports improvement following STM/IASTM.    Pain    No number.    Performing HEP?: Yes    Objective:   All exercises held for 10 seconds unless noted otherwise   Treatments:   This therapist instructed and demonstrated interventions to patient, patient completed the following under direct supervision of this therapist:  Modalities:        8MINUTES  3.3 MHz .8 W c/m2 for 8 minutes to in preparation for ROM/strength exercises   To volar elbow     Therapeutic Exercise:   min  26 MINUTES  Therapeutic Exercise  Therapeutic Exercise Activity 3: AAER x 10  Therapeutic Exercise Activity 4: AROM ER x 10  Therapeutic Exercise Activity 5: AAEXT x 10  Therapeutic Exercise Activity 7: Supine flexion with dowel x 10, then without x 10  Therapeutic Exercise Activity 10: AAIR across back x 10  Therapeutic Exercise Activity 12: Standing flexion less than 90 x 10  Therapeutic Exercise Activity 13: ER T-band grn x 10  Therapeutic Exercise Activity 14: Doorway step out stretch x 10    Manual Therapy:       12 MINUTES  Manual Therapy  Manual Therapy Activity 1: Trigger release to anterior deltoid/ bicep x 2 spots for decreased pain.  Manual Therapy  Activity 2: IASTM to volar elbow followed by STM.        Education: Pt educated on doorway step out stretch.      Efrain Abarca, OT, CHT    Active       OT Goals       Right Shoulder ROM goals:   Flexion 125  Degrees , Abduction 125  Degrees  , Extension  55 Degrees  , Internal Rotation  T12  , External Rotation  50 Degrees        Start:  11/20/24    Expected End:  05/20/25            Patient will improve strengthening in order to perform self-care, household, work and or leisure tasks, Right Shoulder Strength Goal: Flexion /5 , Abduction /5  , Extension /5  ,Internal Rotation  /5 , External Rotation /5   4+/5 globally       Start:  11/20/24    Expected End:  05/20/25            Pt will demonstrate in improve in function by improving to 20.0 on the quick dash to signify increased independence with ADL'S with decreased pain.         Start:  11/20/24    Expected End:  05/20/25               OT Problem       PATIENT WILL DEMONSTRATE INDEPENDENCE IN HOME PROGRAM FOR SUPPORT OF PROGRESSION       Start:  11/20/24    Expected End:  05/20/25            PATIENT WILL REPORT PAIN OF 0-2/10 DEMONSTRATING A REDUCTION OF OVERALL PAIN       Start:  11/20/24    Expected End:  05/20/25

## 2024-12-24 ENCOUNTER — APPOINTMENT (OUTPATIENT)
Dept: OCCUPATIONAL THERAPY | Facility: CLINIC | Age: 64
End: 2024-12-24
Payer: COMMERCIAL

## 2024-12-24 ENCOUNTER — HOSPITAL ENCOUNTER (OUTPATIENT)
Dept: RADIOLOGY | Facility: HOSPITAL | Age: 64
Discharge: HOME | End: 2024-12-24
Payer: COMMERCIAL

## 2024-12-24 VITALS — HEIGHT: 68 IN | BODY MASS INDEX: 36.37 KG/M2 | WEIGHT: 240 LBS

## 2024-12-24 DIAGNOSIS — N64.89 HEMATOMA OF RIGHT BREAST: ICD-10-CM

## 2024-12-24 DIAGNOSIS — Z12.39 ENCOUNTER FOR BREAST CANCER SCREENING USING NON-MAMMOGRAM MODALITY: ICD-10-CM

## 2024-12-24 PROCEDURE — 76642 ULTRASOUND BREAST LIMITED: CPT | Mod: RT

## 2024-12-24 PROCEDURE — 77062 BREAST TOMOSYNTHESIS BI: CPT

## 2024-12-24 PROCEDURE — 76983 USE EA ADDL TARGET LESION: CPT | Mod: RT

## 2024-12-27 ENCOUNTER — TREATMENT (OUTPATIENT)
Dept: OCCUPATIONAL THERAPY | Facility: CLINIC | Age: 64
End: 2024-12-27
Payer: COMMERCIAL

## 2024-12-27 DIAGNOSIS — M79.641 HAND PAIN, RIGHT: Primary | ICD-10-CM

## 2024-12-27 DIAGNOSIS — M25.611 SHOULDER STIFFNESS, RIGHT: ICD-10-CM

## 2024-12-27 DIAGNOSIS — M25.611 SHOULDER STIFFNESS, RIGHT: Primary | ICD-10-CM

## 2024-12-27 DIAGNOSIS — M25.511 CHRONIC RIGHT SHOULDER PAIN: ICD-10-CM

## 2024-12-27 DIAGNOSIS — G89.29 CHRONIC RIGHT SHOULDER PAIN: ICD-10-CM

## 2024-12-27 PROCEDURE — 97140 MANUAL THERAPY 1/> REGIONS: CPT | Mod: GO,CO

## 2024-12-27 PROCEDURE — 97110 THERAPEUTIC EXERCISES: CPT | Mod: GO,CO

## 2024-12-27 PROCEDURE — 97140 MANUAL THERAPY 1/> REGIONS: CPT | Mod: GO

## 2024-12-27 PROCEDURE — 97110 THERAPEUTIC EXERCISES: CPT | Mod: GO

## 2024-12-27 ASSESSMENT — PAIN - FUNCTIONAL ASSESSMENT: PAIN_FUNCTIONAL_ASSESSMENT: 0-10

## 2024-12-27 ASSESSMENT — PAIN SCALES - GENERAL: PAINLEVEL_OUTOF10: 0 - NO PAIN
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
## 2025-01-06 ENCOUNTER — APPOINTMENT (OUTPATIENT)
Dept: ORTHOPEDIC SURGERY | Facility: CLINIC | Age: 65
End: 2025-01-06
Payer: COMMERCIAL

## 2025-01-06 ENCOUNTER — HOSPITAL ENCOUNTER (OUTPATIENT)
Dept: RADIOLOGY | Facility: HOSPITAL | Age: 65
Discharge: HOME | End: 2025-01-06
Payer: MEDICARE

## 2025-01-06 DIAGNOSIS — S62.344A CLOSED NONDISPLACED FRACTURE OF BASE OF FOURTH METACARPAL BONE OF RIGHT HAND, INITIAL ENCOUNTER: ICD-10-CM

## 2025-01-06 DIAGNOSIS — S62.614B DISPLACED FRACTURE OF PROXIMAL PHALANX OF RIGHT RING FINGER, INITIAL ENCOUNTER FOR OPEN FRACTURE: ICD-10-CM

## 2025-01-06 PROCEDURE — 73130 X-RAY EXAM OF HAND: CPT | Mod: RIGHT SIDE | Performed by: RADIOLOGY

## 2025-01-06 PROCEDURE — 99024 POSTOP FOLLOW-UP VISIT: CPT | Performed by: ORTHOPAEDIC SURGERY

## 2025-01-06 PROCEDURE — 73130 X-RAY EXAM OF HAND: CPT | Mod: RT

## 2025-01-06 ASSESSMENT — PAIN - FUNCTIONAL ASSESSMENT: PAIN_FUNCTIONAL_ASSESSMENT: NO/DENIES PAIN
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
## 2025-01-08 ENCOUNTER — APPOINTMENT (OUTPATIENT)
Dept: ORTHOPEDIC SURGERY | Facility: CLINIC | Age: 65
End: 2025-01-08
Payer: COMMERCIAL

## 2025-01-08 VITALS — BODY MASS INDEX: 36.49 KG/M2 | WEIGHT: 240 LBS

## 2025-01-08 DIAGNOSIS — Z98.890 S/P RIGHT ROTATOR CUFF REPAIR: Primary | ICD-10-CM

## 2025-01-08 PROCEDURE — 99024 POSTOP FOLLOW-UP VISIT: CPT

## 2025-01-08 RX ORDER — NAPROXEN 500 MG/1
500 TABLET ORAL 2 TIMES DAILY PRN
Qty: 60 TABLET | Refills: 2 | Status: SHIPPED | OUTPATIENT
Start: 2025-01-08 | End: 2025-04-08

## 2025-01-08 ASSESSMENT — PAIN - FUNCTIONAL ASSESSMENT: PAIN_FUNCTIONAL_ASSESSMENT: NO/DENIES PAIN

## 2025-01-08 NOTE — PROGRESS NOTES
History of Present Illness  Chief Complaint   Patient presents with    Right Shoulder - Post-op       64 y.o. female is 11 weeks status post right shoulder scope with rotator cuff repair and biceps tenodesis.  Patient states that she continues to see improvements in physical therapy.  She continues to work on range of motion.  She states that she still has a little bit of anterior shoulder pain at times.  She states it feels like a pulling sensation when she does stretches.  She notes that physical therapy they are using [place] which does seem to help her pain.  She denies any numbness tingling or concerns with her incision.  She is not currently taking anything for pain.  They state pain is well controlled and continuing to improve. State they are continuing to progress well.    Review of Systems   GENERAL: Negative for malaise, significant weight loss, fever, chills  MUSCULOSKELETAL: see HPI  NEURO:  Negative    Exam  right shoulder incisions are clean dry intact well-healed. No evidence of infection today.  There is no tenderness palpation on exam today.  Range of motion of the right shoulder is 120 degrees of forward flexion abduction, 70 degrees of external rotation and internally rotates to L4.  No tenderness palpation along the bicipital groove. SILT. UE is NVI.    Assessment  Patient is 11 weeks status post right shoulder scope with rotator cuff repair and biceps tenodesis.      Plan  Discussed further management with patient today. Patient should continue to work on range of motion and may begin gradual strenthening in PT. Patient was given exercises to do in addition to PT. We also called in a prescription for anti-inflammatories for the patient.  The patient was informed that there are rare risks of using nonsteroidal antiinflammatory (NSAID) medications.  Risks of NSAIDS include, but are not limited to, upset stomach, ulcers in the stomach and other places in the gastrointestinal tract, and a mild  "increase in cardiovascular risk as a result of the antiinflammatory medications.  In addition, there is an increased risk in bleeding as a result of the medications.  The patient was advised to stop taking the NSAIDs if they cause them to have an upset stomach.  The patient was instructed to take the medication on a p.r.n. basis as needed only.  NSAIDs are not supposed to be taken every day for more than a few weeks.  If they have any questions or problems with the antiinflammatory medications, they should stop taking the medication immediately and call the office. Follow up in 6-8 weeks for a recheck of right shoulder. If she continues to do well at that time we will plan on releasing her regarding this issue.     The patient's height and weight were documented today in the vitals tab in the patient's EPIC chart, and the patient's BMI was calculated. A follow up plan was then developed by Anne-Marie Guzman, per  mandated guidelines, based upon the patient's BMI and the follow up plan was documented in the \"Patient Instructions\" section of the chart by including the \"Weight loss treatments\" attachment.     "

## 2025-01-10 ENCOUNTER — APPOINTMENT (OUTPATIENT)
Dept: ENDOCRINOLOGY | Facility: CLINIC | Age: 65
End: 2025-01-10
Payer: COMMERCIAL

## 2025-01-10 VITALS
SYSTOLIC BLOOD PRESSURE: 112 MMHG | DIASTOLIC BLOOD PRESSURE: 74 MMHG | WEIGHT: 238 LBS | HEART RATE: 76 BPM | BODY MASS INDEX: 36.19 KG/M2

## 2025-01-10 DIAGNOSIS — Z79.4 TYPE 2 DIABETES MELLITUS WITHOUT COMPLICATION, WITH LONG-TERM CURRENT USE OF INSULIN (MULTI): ICD-10-CM

## 2025-01-10 DIAGNOSIS — E11.9 TYPE 2 DIABETES MELLITUS WITHOUT COMPLICATION, WITH LONG-TERM CURRENT USE OF INSULIN (MULTI): ICD-10-CM

## 2025-01-10 LAB — POC HEMOGLOBIN A1C: 5.1 % (ref 4.2–6.5)

## 2025-01-10 PROCEDURE — 83036 HEMOGLOBIN GLYCOSYLATED A1C: CPT | Performed by: INTERNAL MEDICINE

## 2025-01-10 PROCEDURE — 99214 OFFICE O/P EST MOD 30 MIN: CPT | Performed by: INTERNAL MEDICINE

## 2025-01-10 PROCEDURE — 3078F DIAST BP <80 MM HG: CPT | Performed by: INTERNAL MEDICINE

## 2025-01-10 PROCEDURE — 3074F SYST BP LT 130 MM HG: CPT | Performed by: INTERNAL MEDICINE

## 2025-01-10 RX ORDER — TIRZEPATIDE 10 MG/.5ML
10 INJECTION, SOLUTION SUBCUTANEOUS
Qty: 2 ML | Refills: 11 | Status: SHIPPED | OUTPATIENT
Start: 2025-01-10 | End: 2026-01-10

## 2025-01-10 ASSESSMENT — ENCOUNTER SYMPTOMS
HEADACHES: 0
ABDOMINAL PAIN: 0
APPETITE CHANGE: 0
CONSTIPATION: 0
SORE THROAT: 0
COUGH: 0
FREQUENCY: 0
POLYDIPSIA: 0
VOMITING: 0
SHORTNESS OF BREATH: 0
FEVER: 0
APNEA: 1
NAUSEA: 0
DIARRHEA: 0
ARTHRALGIAS: 0
NERVOUS/ANXIOUS: 0
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
## 2025-01-10 NOTE — PATIENT INSTRUCTIONS
A1c 5.1%    RECOMMENDATIONS  Decrease Lantus to 10 units/day now  Increase Mounjaro to 10 mg/week at next refill  Decrease Lantus to 5 units when you increase Mounjaro.    Follow up 3-4 months  Bring written glucose record (2 weeks' worth) to all appointments.
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
## 2025-01-16 ENCOUNTER — TREATMENT (OUTPATIENT)
Dept: OCCUPATIONAL THERAPY | Facility: CLINIC | Age: 65
End: 2025-01-16
Payer: COMMERCIAL

## 2025-01-16 DIAGNOSIS — M79.641 HAND PAIN, RIGHT: ICD-10-CM

## 2025-01-16 DIAGNOSIS — M25.511 CHRONIC RIGHT SHOULDER PAIN: Primary | ICD-10-CM

## 2025-01-16 DIAGNOSIS — G89.29 CHRONIC RIGHT SHOULDER PAIN: Primary | ICD-10-CM

## 2025-01-16 PROCEDURE — 97035 APP MDLTY 1+ULTRASOUND EA 15: CPT | Mod: GO

## 2025-01-16 PROCEDURE — 97110 THERAPEUTIC EXERCISES: CPT | Mod: GO

## 2025-01-16 NOTE — PROGRESS NOTES
Occupational Therapy  Occupational Therapy Treatment    Patient Name: Brigida Phillips  MRN: [id number]  Today's Date: 1/16/2025  Time Calculation  Start Time: 1019  Stop Time: 1058  Time Calculation (min): 39 min           General Comment: Visit 9/ 1 for year, onset sx, 10/24, 30 visits    Assessment: Tissue mobility increased , Joint mobility/ROM increased, flexibility increased, muscle fatigue increased , Pain, muscle guarding.    Pt completed session with some difficulty.           Plan: Progress with POC, As tolerated and monitor home program.        Current Problem  1. Chronic right shoulder pain        2. Hand pain, right  Follow Up In Occupational Therapy          Precautions N/A       Subjective:   Patient reports How much more therapy do I need.     Pain    No number given.     Performing HEP?: Yes    Objective:   All exercises held for 10 seconds unless noted otherwise   Treatments:   This therapist instructed and demonstrated interventions to patient, patient completed the following under direct supervision of this therapist:    Therapeutic Exercise:   min  39 MINUTES  Therapeutic Exercise  Therapeutic Exercise Activity 2: Scapular retraction x 10  Therapeutic Exercise Activity 3: AAER x 10  Therapeutic Exercise Activity 4: AROM ER x 10  Therapeutic Exercise Activity 6: AROM Ext x 10  Therapeutic Exercise Activity 7: Door way step out stretch x 10  Therapeutic Exercise Activity 8: AAIR up back x 10  Therapeutic Exercise Activity 9: Supine dowel flexion x 10  Therapeutic Exercise Activity 10: Supine AROM flexion x 10  Therapeutic Exercise Activity 11: Pendulum 4#  Therapeutic Exercise Activity 12: sidelying abd x 10  Therapeutic Exercise Activity 13: ER T-band maroon x 10  Therapeutic Exercise Activity 14: Standing wall slide AA x 10  Therapeutic Exercise Activity 15: Standing scaption x 10  Therapeutic Exercise Activity 16: standing flexion to 90 x 10        Education: Pt educated on doorway step out stretch  and wall slide flexion stretch.       Efrain Abarca, OT, CHT    Active       OT Goals       Right Shoulder ROM goals:   Flexion 125  Degrees , Abduction 125  Degrees  , Extension  55 Degrees  , Internal Rotation  T12  , External Rotation  50 Degrees        Start:  11/20/24    Expected End:  05/20/25            Patient will improve strengthening in order to perform self-care, household, work and or leisure tasks, Right Shoulder Strength Goal: Flexion /5 , Abduction /5  , Extension /5  ,Internal Rotation  /5 , External Rotation /5   4+/5 globally       Start:  11/20/24    Expected End:  05/20/25            Pt will demonstrate in improve in function by improving to 20.0 on the quick dash to signify increased independence with ADL'S with decreased pain.         Start:  11/20/24    Expected End:  05/20/25               OT Problem       PATIENT WILL DEMONSTRATE INDEPENDENCE IN HOME PROGRAM FOR SUPPORT OF PROGRESSION       Start:  11/20/24    Expected End:  05/20/25            PATIENT WILL REPORT PAIN OF 0-2/10 DEMONSTRATING A REDUCTION OF OVERALL PAIN       Start:  11/20/24    Expected End:  05/20/25

## 2025-01-17 ENCOUNTER — TREATMENT (OUTPATIENT)
Dept: OCCUPATIONAL THERAPY | Facility: CLINIC | Age: 65
End: 2025-01-17
Payer: COMMERCIAL

## 2025-01-17 DIAGNOSIS — M79.641 HAND PAIN, RIGHT: ICD-10-CM

## 2025-01-17 PROCEDURE — 97110 THERAPEUTIC EXERCISES: CPT | Mod: GO,CO

## 2025-01-17 ASSESSMENT — PAIN SCALES - GENERAL: PAINLEVEL_OUTOF10: 0 - NO PAIN

## 2025-01-17 ASSESSMENT — PAIN - FUNCTIONAL ASSESSMENT: PAIN_FUNCTIONAL_ASSESSMENT: 0-10
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
## 2025-01-21 DIAGNOSIS — F32.A DEPRESSION, UNSPECIFIED DEPRESSION TYPE: ICD-10-CM

## 2025-01-21 RX ORDER — ESCITALOPRAM OXALATE 20 MG/1
20 TABLET ORAL DAILY
Qty: 90 TABLET | Refills: 2 | Status: SHIPPED | OUTPATIENT
Start: 2025-01-21

## 2025-01-22 ENCOUNTER — CLINICAL SUPPORT (OUTPATIENT)
Dept: OCCUPATIONAL THERAPY | Facility: CLINIC | Age: 65
End: 2025-01-22
Payer: COMMERCIAL

## 2025-01-22 ENCOUNTER — TREATMENT (OUTPATIENT)
Dept: OCCUPATIONAL THERAPY | Facility: CLINIC | Age: 65
End: 2025-01-22
Payer: COMMERCIAL

## 2025-01-22 DIAGNOSIS — M79.641 HAND PAIN, RIGHT: ICD-10-CM

## 2025-01-22 DIAGNOSIS — M25.511 CHRONIC RIGHT SHOULDER PAIN: ICD-10-CM

## 2025-01-22 DIAGNOSIS — M25.611 SHOULDER STIFFNESS, RIGHT: Primary | ICD-10-CM

## 2025-01-22 DIAGNOSIS — G89.29 CHRONIC RIGHT SHOULDER PAIN: ICD-10-CM

## 2025-01-22 DIAGNOSIS — M25.611 SHOULDER STIFFNESS, RIGHT: ICD-10-CM

## 2025-01-22 PROCEDURE — 97530 THERAPEUTIC ACTIVITIES: CPT | Mod: GO

## 2025-01-22 PROCEDURE — 97110 THERAPEUTIC EXERCISES: CPT | Mod: GO

## 2025-01-22 PROCEDURE — 97035 APP MDLTY 1+ULTRASOUND EA 15: CPT | Mod: GO

## 2025-01-22 NOTE — PROGRESS NOTES
Occupational Therapy  Occupational Therapy Treatment    Patient Name: Brigida Phillips  MRN: [id number]  Today's Date: 1/22/2025  Time Calculation  Start Time: 0934  Stop Time: 1013  Time Calculation (min): 39 min    Visit # 8       General Comment: Visit 9/20 (RF)    Assessment: Tissue mobility increased , Joint mobility/ROM increased, flexibility increased, muscle fatigue increased.    Pt completed session with some difficulty.           Plan: Progress with POC, As tolerated and monitor home program.        Current Problem  1. Shoulder stiffness, right  Follow Up In Occupational Therapy          Precautions N/A       Subjective:   Patient reports It hurts when my finger gets pulled too hard.     Pain   0/10    Performing HEP?: Yes    Objective:   All exercises held for 10 seconds unless noted otherwise   Treatments:   This therapist instructed and demonstrated interventions to patient, patient completed the following under direct supervision of this therapist:  Modalities:        8MINUTES  3.3 MHz .8 W c/m2 for 8 minutes to in preparation for ROM/strength exercises to dorsal index.       Therapeutic Exercise:   min  20 MINUTES  Therapeutic Exercise  Therapeutic Exercise Activity 14: Hook to fist with highlighter x 10 .  Therapeutic Exercise Activity 15: RF MP ext extension with  using highlighter between SF/LF x10 and completing flexion.  Therapeutic Exercise Activity 16: Intrinsic minus tabletop towel stretch.  Therapeutic Exercise Activity 17: Prayer stretch x 10 for digit ext x 10  Therapeutic Exercise Activity 18: Sequential tapping of digits.  Therapeutic Exercise Activity 19: Reverse blocking x 10    Manual Therapy:       11 MINUTES  Manual Therapy  Manual Therapy Activity 1: Distraction of RF at roughly 90 and with MP straight x 10each  Manual Therapy Activity 2: Joint mobs to IP's x 6 each        Education: Pt educated on reverse blocking to PIP of RF and prayer stretch.       Efrain Abarca, OT,  CHT    Active       OT Goals       Patient will improve hand strength in order to perform self-care, household, work and or leisure tasks Right  Hand Strength Goal;     40      #, Key Pinch   15      #, 3 Jaw Jai  15       #         Start:  11/13/24    Expected End:  05/13/25            Patient will improve finger ROM in order to perform self-care, household, work and or leisure tasks, Right Finger ROM Goal (Total Active Motion)  Index 230  Degrees, Ring 230     Degrees, Long  230 Degrees, Small  230  Degrees.        Start:  11/13/24    Expected End:  05/13/25            Pt will demonstrate in improve in function by improving to 20.0 on the quick dash to signify increased independence with ADL'S with decreased pain.         Start:  11/13/24    Expected End:  05/13/25               OT Problem       PATIENT WILL DEMONSTRATE INDEPENDENCE IN HOME PROGRAM FOR SUPPORT OF PROGRESSION       Start:  11/13/24    Expected End:  05/13/25            PATIENT WILL REPORT PAIN OF 0-2/10 DEMONSTRATING A REDUCTION OF OVERALL PAIN       Start:  11/13/24    Expected End:  05/13/25

## 2025-01-22 NOTE — PROGRESS NOTES
Occupational Therapy  Occupational Therapy Treatment Re-check     Patient Name: Brigida Phillips  MRN: [id number]  Today's Date: 1/22/2025  Time Calculation  Start Time: 0845  Stop Time: 0932  Time Calculation (min): 47 min    Visit # 10       General Comment: Visit 10, onset sx, 10/24, 30 visits    Assessment:  increased, flexibility increased, muscle fatigue increased.  Pt completed session with some difficulty.  Pt demonstrates decreased strength for full AROM in flexion and is limited in abd PROM/AROM.          Plan: Progress with POC, As tolerated and monitor home program.        Current Problem  1. Shoulder stiffness, right        2. Chronic right shoulder pain        3. Hand pain, right  Follow Up In Occupational Therapy          Precautions: 12 weeks        Subjective:   Patient reports My shoulder is feeling good.     Pain   0/10    Performing HEP?: Yes    Objective:   All exercises held for 10 seconds unless noted otherwise   Treatments:   This therapist instructed and demonstrated interventions to patient, patient completed the following under direct supervision of this therapist:    Therapeutic Exercise:   min  31 MINUTES  Therapeutic Exercise  Therapeutic Exercise Activity 2: Scapular retraction x 10  Therapeutic Exercise Activity 3: AAER x 10  Therapeutic Exercise Activity 4: AROM ER x 10  Therapeutic Exercise Activity 6: AROM Ext x 10  Therapeutic Exercise Activity 7: Door way step out stretch x 10  Therapeutic Exercise Activity 8: AAIR up back x 10  Therapeutic Exercise Activity 9: AROM IR X 10  Therapeutic Exercise Activity 12: sidelying abd x 10 bent elbow with therapists assit for scapular depression.  Therapeutic Exercise Activity 14: standing flexion x 10 with scapular emphasis x 10 below 90.  Therapeutic Exercise Activity 15: Standing scaption x 10 below 90 with scapular focus.    Manual Therapy:       8 MINUTES  Manual Therapy  Manual Therapy Activity 1: Distraction at 45 degrees x 6  Manual  Therapy Activity 2: PROM abd in supine x 8.    Therapeutic Activity:      8 MINUTES   Re-check      Education:  Pt educated on completing AROM in abd and flexion.      Efrain Abarca, OT, CHT    Active       OT Goals       Right Shoulder ROM goals:   Flexion 125  Degrees , Abduction 125  Degrees  , Extension  55 Degrees  , Internal Rotation  T12  , External Rotation  50 Degrees  (Progressing)       Start:  11/20/24    Expected End:  05/20/25         Goal Note       Flexion  68 Degrees , Abduction 69 Degrees , Extension 56 Degrees , Internal Rotation T12 , External Rotation 57 Degrees                  Patient will improve strengthening in order to perform self-care, household, work and or leisure tasks, Right Shoulder Strength Goal: Flexion /5 , Abduction /5  , Extension /5  ,Internal Rotation  /5 , External Rotation /5   4+/5 globally (Progressing)       Start:  11/20/24    Expected End:  05/20/25            Pt will demonstrate in improve in function by improving to 20.0 on the quick dash to signify increased independence with ADL'S with decreased pain.   (Met)       Start:  11/20/24    Expected End:  05/20/25    Resolved:  01/22/25      Goal Note       9.09                 OT Problem       PATIENT WILL DEMONSTRATE INDEPENDENCE IN HOME PROGRAM FOR SUPPORT OF PROGRESSION (Progressing)       Start:  11/20/24    Expected End:  05/20/25            PATIENT WILL REPORT PAIN OF 0-2/10 DEMONSTRATING A REDUCTION OF OVERALL PAIN (Met)       Start:  11/20/24    Expected End:  05/20/25    Resolved:  01/22/25

## 2025-01-24 ENCOUNTER — TREATMENT (OUTPATIENT)
Dept: OCCUPATIONAL THERAPY | Facility: CLINIC | Age: 65
End: 2025-01-24
Payer: COMMERCIAL

## 2025-01-24 DIAGNOSIS — M25.611 SHOULDER STIFFNESS, RIGHT: Primary | ICD-10-CM

## 2025-01-24 DIAGNOSIS — G89.29 CHRONIC RIGHT SHOULDER PAIN: ICD-10-CM

## 2025-01-24 DIAGNOSIS — M79.641 HAND PAIN, RIGHT: ICD-10-CM

## 2025-01-24 DIAGNOSIS — M25.511 CHRONIC RIGHT SHOULDER PAIN: ICD-10-CM

## 2025-01-24 PROCEDURE — 97530 THERAPEUTIC ACTIVITIES: CPT | Mod: GO

## 2025-01-24 PROCEDURE — 97110 THERAPEUTIC EXERCISES: CPT | Mod: GO

## 2025-01-24 NOTE — PROGRESS NOTES
Occupational Therapy  Occupational Therapy Treatment    Patient Name: Brigida Phillips  MRN: [id number]  Today's Date: 1/24/2025  Time Calculation  Start Time: 0801  Stop Time: 0844  Time Calculation (min): 43 min    Visit # 11       General Comment: Visit 11, onset sx, 10/24, 30 visits    Assessment: Joint mobility/ROM increased, flexibility increased, muscle fatigue increased.    Pt completed session with some difficulty.           Plan: Progress with POC, As tolerated and monitor home program.        Current Problem  1. Shoulder stiffness, right        2. Chronic right shoulder pain        3. Hand pain, right  Follow Up In Occupational Therapy          Precautions 12+ weeks       Subjective:   Patient reports No pain.    Pain   0/10    Performing HEP?: Yes    Objective:   All exercises held for 10 seconds unless noted otherwise   Treatments:   This therapist instructed and demonstrated interventions to patient, patient completed the following under direct supervision of this therapist:      Therapeutic Exercise:   min  27 MINUTES  Therapeutic Exercise  Therapeutic Exercise Activity 3: AAER x 10  Therapeutic Exercise Activity 4: AROM ER x 10  Therapeutic Exercise Activity 5: AAEXT x 5  Therapeutic Exercise Activity 6: AROM Ext x 10  Therapeutic Exercise Activity 7: Supine dowel flexion x 10  Therapeutic Exercise Activity 8: Supine flexion x 10  Therapeutic Exercise Activity 9: AROM IR X 10  Therapeutic Exercise Activity 10: Doorway step out stretch x 10  Therapeutic Exercise Activity 11: AAIR x 10 up back.  Therapeutic Exercise Activity 12: standing scaption x 10 below 90  Therapeutic Exercise Activity 13: Standing wall stretch x 6 at 30 sec.  Therapeutic Exercise Activity 14: Standing 90 degree flexion with scapular retraction.    Manual Therapy:       8 MINUTES  Manual Therapy  Manual Therapy Activity 1: Supine scapulohumeral stretch x 8 in flexion.  Manual Therapy Activity 2: Sidelying scapulohumeral stretch x  10    Therapeutic Activity:      8 MINUTES  Therapeutic Activity  Therapeutic Activity 1: Shoulder arch both directions at eye level.      Education: Pt educated to reach out with sidelying abd for decreased compensation at trap. Pt educated on low row with band.       Efrain Abarca, OT, CHT    Active       OT Goals       Right Shoulder ROM goals:   Flexion 125  Degrees , Abduction 125  Degrees  , Extension  55 Degrees  , Internal Rotation  T12  , External Rotation  50 Degrees  (Progressing)       Start:  11/20/24    Expected End:  05/20/25         Goal Note       Flexion  68 Degrees , Abduction 69 Degrees , Extension 56 Degrees , Internal Rotation T12 , External Rotation 57 Degrees                  Patient will improve strengthening in order to perform self-care, household, work and or leisure tasks, Right Shoulder Strength Goal: Flexion /5 , Abduction /5  , Extension /5  ,Internal Rotation  /5 , External Rotation /5   4+/5 globally (Progressing)       Start:  11/20/24    Expected End:  05/20/25            Pt will demonstrate in improve in function by improving to 20.0 on the quick dash to signify increased independence with ADL'S with decreased pain.   (Met)       Start:  11/20/24    Expected End:  05/20/25    Resolved:  01/22/25      Goal Note       9.09                 OT Problem       PATIENT WILL DEMONSTRATE INDEPENDENCE IN HOME PROGRAM FOR SUPPORT OF PROGRESSION (Progressing)       Start:  11/20/24    Expected End:  05/20/25            PATIENT WILL REPORT PAIN OF 0-2/10 DEMONSTRATING A REDUCTION OF OVERALL PAIN (Met)       Start:  11/20/24    Expected End:  05/20/25    Resolved:  01/22/25

## 2025-01-29 ENCOUNTER — TREATMENT (OUTPATIENT)
Dept: OCCUPATIONAL THERAPY | Facility: CLINIC | Age: 65
End: 2025-01-29
Payer: COMMERCIAL

## 2025-01-29 ENCOUNTER — CLINICAL SUPPORT (OUTPATIENT)
Dept: OCCUPATIONAL THERAPY | Facility: CLINIC | Age: 65
End: 2025-01-29
Payer: COMMERCIAL

## 2025-01-29 DIAGNOSIS — M79.641 HAND PAIN, RIGHT: ICD-10-CM

## 2025-01-29 DIAGNOSIS — M25.611 SHOULDER STIFFNESS, RIGHT: ICD-10-CM

## 2025-01-29 PROCEDURE — 97140 MANUAL THERAPY 1/> REGIONS: CPT | Mod: GO

## 2025-01-29 PROCEDURE — 97110 THERAPEUTIC EXERCISES: CPT | Mod: GO

## 2025-01-29 NOTE — PROGRESS NOTES
Occupational Therapy  Occupational Therapy Treatment    Patient Name: Brigida Phillips  MRN: [id number]  Today's Date: 1/29/2025  Time Calculation  Start Time: 0801  Stop Time: 0844  Time Calculation (min): 43 min    Visit # 9       General Comment: Visit 9, onset 10/24    Assessment: Tissue mobility increased , Joint mobility/ROM increased, flexibility increased, muscle fatigue increased.    Pt completed session with some difficulty.           Plan: Progress with POC, As tolerated and monitor home program.        Current Problem  1. Hand pain, right  Follow Up In Occupational Therapy          Precautions N/A       Subjective:   Patient reports It's just stiff in the morning.     Pain   0/10    Performing HEP?: Yes    Objective:   All exercises held for 10 seconds unless noted otherwise   Treatments:   This therapist instructed and demonstrated interventions to patient, patient completed the following under direct supervision of this therapist:  Modalities:        8MINUTES  3.3 MHz .8 W c/m2 for 8 minutes to in preparation for ROM/strength exercises  4 min volar and 4 min dorsal.     Therapeutic Exercise:   min  25 MINUTES  Therapeutic Exercise  Therapeutic Exercise Activity 17: Hook to fist with highlighter x 10  Therapeutic Exercise Activity 18: Blocking to IP's x 10 each.  Therapeutic Exercise Activity 19: Reverse blocking x 10    Manual Therapy:       10 MINUTES  Manual Therapy  Manual Therapy Activity 1: Joint mobs to RF IP's with MP straight x 10ea then x 10ea with MP bent to 90 degrees.    Therapeutic Activity:        MINUTES  Therapeutic Activity  Therapeutic Activity 1: Pt fir with size AA finger spring ext assistr splint with closed cell foam added to DIP to build it up.      Education: Pt educated on finger extension splint wear.       Efrain Abarca, OT, CHT    Active       OT Goals       Patient will improve hand strength in order to perform self-care, household, work and or leisure tasks Right  Hand  Strength Goal;     40      #, Key Pinch   15      #, 3 Jaw Jai  15       #         Start:  11/13/24    Expected End:  05/13/25            Patient will improve finger ROM in order to perform self-care, household, work and or leisure tasks, Right Finger ROM Goal (Total Active Motion)  Index 230  Degrees, Ring 230     Degrees, Long  230 Degrees, Small  230  Degrees.        Start:  11/13/24    Expected End:  05/13/25            Pt will demonstrate in improve in function by improving to 20.0 on the quick dash to signify increased independence with ADL'S with decreased pain.         Start:  11/13/24    Expected End:  05/13/25               OT Problem       PATIENT WILL DEMONSTRATE INDEPENDENCE IN HOME PROGRAM FOR SUPPORT OF PROGRESSION       Start:  11/13/24    Expected End:  05/13/25            PATIENT WILL REPORT PAIN OF 0-2/10 DEMONSTRATING A REDUCTION OF OVERALL PAIN       Start:  11/13/24    Expected End:  05/13/25

## 2025-01-29 NOTE — PROGRESS NOTES
Occupational Therapy  Occupational Therapy Treatment    Patient Name: Brigida Phillips  MRN: [id number]  Today's Date: 1/29/2025  Time Calculation  Start Time: 0847  Stop Time: 0929  Time Calculation (min): 42 min    Visit # 12       General Comment: Visit 12, onset sx, 10/24, 30 visits    Assessment: Joint mobility/ROM increased, flexibility increased, muscle fatigue increased.  Pt completed session with some difficulty.           Plan: Progress with POC, As tolerated and monitor home program.        Current Problem  1. Shoulder stiffness, right  Follow Up In Occupational Therapy          Precautions N/A past 12 weeks.        Subjective:   Patient reports I have no pain in the shoulder.     Pain   No pain.     Performing HEP?: Yes    Objective:   All exercises held for 10 seconds unless noted otherwise   Treatments:   This therapist instructed and demonstrated interventions to patient, patient completed the following under direct supervision of this therapist:      Therapeutic Exercise:   min  27 MINUTES  Therapeutic Exercise  Therapeutic Exercise Activity 1: Pendulum x 3 7#  Therapeutic Exercise Activity 7: Supine dowel flexion x 10  Therapeutic Exercise Activity 8: Supine flexion x 10  Therapeutic Exercise Activity 9: Sidelying ABD x 10  Therapeutic Exercise Activity 10: Standing flexion x 10  Therapeutic Exercise Activity 13: Standing wall stretch x 6 at 30 sec.  Therapeutic Exercise Activity 14: Standing 90 degree flexion with scapular retraction.  Therapeutic Exercise Activity 15: Self posterior capsule stretch x 10    Manual Therapy:       15 MINUTES  Manual Therapy  Manual Therapy Activity 1: Supine scapulohumeral stretch x 6 with flexion.  Manual Therapy Activity 2: Supine 90 degree abd distraction x 10  Manual Therapy Activity 3: Supine abd with scapular stretch x 6        Education: Pt educated on standing flexion full ROM utilizing mirror to ensure no compensation. Pt also educated on wall lift off. Pt  educated on self posterior capsule stretch.       Efrain Abarca, OT, CHT    Active       OT Goals       Right Shoulder ROM goals:   Flexion 125  Degrees , Abduction 125  Degrees  , Extension  55 Degrees  , Internal Rotation  T12  , External Rotation  50 Degrees  (Progressing)       Start:  11/20/24    Expected End:  05/20/25         Goal Note       Flexion  68 Degrees , Abduction 69 Degrees , Extension 56 Degrees , Internal Rotation T12 , External Rotation 57 Degrees                  Patient will improve strengthening in order to perform self-care, household, work and or leisure tasks, Right Shoulder Strength Goal: Flexion /5 , Abduction /5  , Extension /5  ,Internal Rotation  /5 , External Rotation /5   4+/5 globally (Progressing)       Start:  11/20/24    Expected End:  05/20/25            Pt will demonstrate in improve in function by improving to 20.0 on the quick dash to signify increased independence with ADL'S with decreased pain.   (Met)       Start:  11/20/24    Expected End:  05/20/25    Resolved:  01/22/25      Goal Note       9.09                 OT Problem       PATIENT WILL DEMONSTRATE INDEPENDENCE IN HOME PROGRAM FOR SUPPORT OF PROGRESSION (Progressing)       Start:  11/20/24    Expected End:  05/20/25            PATIENT WILL REPORT PAIN OF 0-2/10 DEMONSTRATING A REDUCTION OF OVERALL PAIN (Met)       Start:  11/20/24    Expected End:  05/20/25    Resolved:  01/22/25            DM (diabetes mellitus)

## 2025-01-31 ENCOUNTER — TREATMENT (OUTPATIENT)
Dept: OCCUPATIONAL THERAPY | Facility: CLINIC | Age: 65
End: 2025-01-31
Payer: COMMERCIAL

## 2025-01-31 DIAGNOSIS — M25.611 SHOULDER STIFFNESS, RIGHT: ICD-10-CM

## 2025-01-31 DIAGNOSIS — M79.641 HAND PAIN, RIGHT: ICD-10-CM

## 2025-01-31 PROCEDURE — 97530 THERAPEUTIC ACTIVITIES: CPT | Mod: GO

## 2025-01-31 PROCEDURE — 97140 MANUAL THERAPY 1/> REGIONS: CPT | Mod: GO,CO

## 2025-01-31 PROCEDURE — 97110 THERAPEUTIC EXERCISES: CPT | Mod: GO

## 2025-01-31 PROCEDURE — 97110 THERAPEUTIC EXERCISES: CPT | Mod: GO,CO

## 2025-01-31 ASSESSMENT — PAIN SCALES - GENERAL: PAINLEVEL_OUTOF10: 0 - NO PAIN

## 2025-01-31 ASSESSMENT — PAIN - FUNCTIONAL ASSESSMENT: PAIN_FUNCTIONAL_ASSESSMENT: 0-10

## 2025-01-31 NOTE — PROGRESS NOTES
Occupational Therapy  Occupational Therapy Treatment    Patient Name: Brigida Phillips  MRN: [id number]  Today's Date: 1/31/2025  Time Calculation  Start Time: 0801  Stop Time: 0845  Time Calculation (min): 44 min    Visit # 13       General Comment: Visit 13, onset sx, 10/24, 30 visits    Assessment:  Joint mobility/ROM increased, flexibility increased, muscle fatigue increased.    Pt completed session with some difficulty.           Plan: Progress with POC, As tolerated and monitor home program.        Current Problem  1. Shoulder stiffness, right  Follow Up In Occupational Therapy          Precautions: N/A       Subjective:   Patient reports I have no pain.     Pain   0/10    Performing HEP?: Yes    Objective:   All exercises held for 10 seconds unless noted otherwise   Treatments:   This therapist instructed and demonstrated interventions to patient, patient completed the following under direct supervision of this therapist:      Therapeutic Exercise:   min  36 MINUTES  Therapeutic Exercise  Therapeutic Exercise Activity 1: Pendulum x 3 7#  Therapeutic Exercise Activity 3: AAER x 10  Therapeutic Exercise Activity 4: AROM ER x 10  Therapeutic Exercise Activity 5: AAEXT x 5  Therapeutic Exercise Activity 6: AROM Ext x 10  Therapeutic Exercise Activity 7: Doorway step out stretch.  Therapeutic Exercise Activity 8: Supine flexion x 10  Therapeutic Exercise Activity 9: Sidelying abd x 10  Therapeutic Exercise Activity 10: AROM IR x 10  Therapeutic Exercise Activity 11: AAIR x 8 up back.  Therapeutic Exercise Activity 12: Standing wall lift off.  Therapeutic Exercise Activity 13: Standing wall stretch x 6 at 30 sec.  Therapeutic Exercise Activity 15: Self posterior capsule stretch x 8    Manual Therapy:         MINUTES  Manual Therapy  Manual Therapy Activity 1: Supine scapulohumeral stretch x 8 with flexion.  Manual Therapy Activity 2: Sidelying scapulohumeral stretch x 8    Therapeutic Activity  Therapeutic Activity 1:  Shoulder arch both directions overhead.  8 min    Education: Cues for scapular retraction with sidelying abd.       Efrain Abarca, OT, CHT    Active       OT Goals       Right Shoulder ROM goals:   Flexion 125  Degrees , Abduction 125  Degrees  , Extension  55 Degrees  , Internal Rotation  T12  , External Rotation  50 Degrees  (Progressing)       Start:  11/20/24    Expected End:  05/20/25         Goal Note       Flexion  68 Degrees , Abduction 69 Degrees , Extension 56 Degrees , Internal Rotation T12 , External Rotation 57 Degrees                  Patient will improve strengthening in order to perform self-care, household, work and or leisure tasks, Right Shoulder Strength Goal: Flexion /5 , Abduction /5  , Extension /5  ,Internal Rotation  /5 , External Rotation /5   4+/5 globally (Progressing)       Start:  11/20/24    Expected End:  05/20/25            Pt will demonstrate in improve in function by improving to 20.0 on the quick dash to signify increased independence with ADL'S with decreased pain.   (Met)       Start:  11/20/24    Expected End:  05/20/25    Resolved:  01/22/25      Goal Note       9.09                 OT Problem       PATIENT WILL DEMONSTRATE INDEPENDENCE IN HOME PROGRAM FOR SUPPORT OF PROGRESSION (Progressing)       Start:  11/20/24    Expected End:  05/20/25            PATIENT WILL REPORT PAIN OF 0-2/10 DEMONSTRATING A REDUCTION OF OVERALL PAIN (Met)       Start:  11/20/24    Expected End:  05/20/25    Resolved:  01/22/25

## 2025-01-31 NOTE — PROGRESS NOTES
"    Occupational Therapy  Occupational Therapy Treatment    Patient Name: Brigida Phillips  MRN: [id number]  Today's Date: 1/31/2025  Time Calculation  Start Time: 0901  Stop Time: 0946  Time Calculation (min): 45 min    Insurance:  Visit number: 7 of 16  Authorization info:   Insurance Type: Accident non-medicare related  OT Last Visit  OT Received On: 01/31/25    General  Reason for visit:   Reason for Referral: R Ring finger Metacarpal and P1 fracture  Referred By: Dr. Waite, Dr aragon.  General Comment: visit #7    Current Problem  1. Hand pain, right  Follow Up In Occupational Therapy          Precautions: None       Medical History Form: medical History Reviewed (scanned into chart)    Subjective:   \"My finger is mostly stiff more than painful, I can do most of the things I need to with this hand now.\"    Performing HEP?: Yes    Pain  Pain Assessment: 0-10  0-10 (Numeric) Pain Score: 0 - No pain (\"just tight feeling\")  Pain Location: Hand  Pain Orientation: Right    Objective:   Physical Observation:   Measurements:    Treatments:   This therapist instructed and demonstrated interventions to patient, patient completed the following under direct supervision of this therapist:  Modalities:        8 Minutes  Modalities Used: Yes  Modality 1: Timed Ultrasound (3.3 MHz .8 W c/m2 for 8 minutes to in preparation for ROM/strength exercises to dorsal RF.)    Therapeutic Exercise:   min  36 MINUTES  Therapeutic Exercise  Therapeutic Exercise Activity 14: Hook to fist with highlighter x 10 .  Therapeutic Exercise Activity 16: Intrinsic minus tabletop towel stretch.  Therapeutic Exercise Activity 17: Prayer stretch x 10 for digit ext x 10  Therapeutic Exercise Activity 18: Spider mirror push-ups 10x each with short holds  Therapeutic Exercise Activity 19: Reverse blocking x 10  Therapeutic Exercise Activity 20: Rolling to fingertips, mass Grasp, pinching 2 x 10 each green putty.    Manual Therapy:       10 MINUTES  Manual " Therapy  Manual Therapy Activity 1: 5x each gentle digital distraction with MP neutral RF & SF with 20 second hold    Assessment:       Plan:  To continue with current plan of care with emphasis on ...  Patient to call if has problems.       Active       OT Goals       Patient will improve hand strength in order to perform self-care, household, work and or leisure tasks Right  Hand Strength Goal;     40      #, Key Pinch   15      #, 3 Jaw Jai  15       #         Start:  11/13/24    Expected End:  05/13/25            Patient will improve finger ROM in order to perform self-care, household, work and or leisure tasks, Right Finger ROM Goal (Total Active Motion)  Index 230  Degrees, Ring 230     Degrees, Long  230 Degrees, Small  230  Degrees.        Start:  11/13/24    Expected End:  05/13/25            Pt will demonstrate in improve in function by improving to 20.0 on the quick dash to signify increased independence with ADL'S with decreased pain.         Start:  11/13/24    Expected End:  05/13/25               OT Problem       PATIENT WILL DEMONSTRATE INDEPENDENCE IN HOME PROGRAM FOR SUPPORT OF PROGRESSION       Start:  11/13/24    Expected End:  05/13/25            PATIENT WILL REPORT PAIN OF 0-2/10 DEMONSTRATING A REDUCTION OF OVERALL PAIN       Start:  11/13/24    Expected End:  05/13/25              KARRI Santos/SHANON

## 2025-02-02 DIAGNOSIS — E11.9 TYPE 2 DIABETES MELLITUS WITHOUT COMPLICATION, WITH LONG-TERM CURRENT USE OF INSULIN (MULTI): ICD-10-CM

## 2025-02-02 DIAGNOSIS — Z79.4 TYPE 2 DIABETES MELLITUS WITHOUT COMPLICATION, WITH LONG-TERM CURRENT USE OF INSULIN (MULTI): ICD-10-CM

## 2025-02-05 ENCOUNTER — TREATMENT (OUTPATIENT)
Dept: OCCUPATIONAL THERAPY | Facility: CLINIC | Age: 65
End: 2025-02-05
Payer: COMMERCIAL

## 2025-02-05 DIAGNOSIS — M25.511 CHRONIC RIGHT SHOULDER PAIN: Primary | ICD-10-CM

## 2025-02-05 DIAGNOSIS — G89.29 CHRONIC RIGHT SHOULDER PAIN: Primary | ICD-10-CM

## 2025-02-05 DIAGNOSIS — M79.641 HAND PAIN, RIGHT: ICD-10-CM

## 2025-02-05 DIAGNOSIS — M25.611 SHOULDER STIFFNESS, RIGHT: ICD-10-CM

## 2025-02-05 PROCEDURE — 97140 MANUAL THERAPY 1/> REGIONS: CPT | Mod: GO

## 2025-02-05 PROCEDURE — 97530 THERAPEUTIC ACTIVITIES: CPT | Mod: GO

## 2025-02-05 PROCEDURE — 97110 THERAPEUTIC EXERCISES: CPT | Mod: GO

## 2025-02-05 NOTE — PROGRESS NOTES
Occupational Therapy  Occupational Therapy Treatment    Patient Name: Brigida Phillips  MRN: [id number]  Treatment Date: 2/5/2025  Visit # 14  Time Calculation  Start Time: 0857  Stop Time: 0943  Time Calculation (min): 46 min     General  Referred By: Dr aragon.  General Comment: Visit 14    TREATING DIAGNOSIS:  1. Chronic right shoulder pain        2. Shoulder stiffness, right  Follow Up In Occupational Therapy          ASSESSMENT     Brigida Phillips is making good  improvements with her AROM.       PLAN:  To continue with current plan of care with emphasis on strengthening and shoulder ROM.  Patient to call if has problems.        Active       OT Goals       Right Shoulder ROM goals:   Flexion 125  Degrees , Abduction 125  Degrees  , Extension  55 Degrees  , Internal Rotation  T12  , External Rotation  50 Degrees  (Progressing)       Start:  11/20/24    Expected End:  05/20/25         Goal Note       Flexion  68 Degrees , Abduction 69 Degrees , Extension 56 Degrees , Internal Rotation T12 , External Rotation 57 Degrees                  Patient will improve strengthening in order to perform self-care, household, work and or leisure tasks, Right Shoulder Strength Goal: Flexion /5 , Abduction /5  , Extension /5  ,Internal Rotation  /5 , External Rotation /5   4+/5 globally (Progressing)       Start:  11/20/24    Expected End:  05/20/25            Pt will demonstrate in improve in function by improving to 20.0 on the quick dash to signify increased independence with ADL'S with decreased pain.   (Met)       Start:  11/20/24    Expected End:  05/20/25    Resolved:  01/22/25      Goal Note       9.09                 OT Problem       PATIENT WILL DEMONSTRATE INDEPENDENCE IN HOME PROGRAM FOR SUPPORT OF PROGRESSION (Progressing)       Start:  11/20/24    Expected End:  05/20/25            PATIENT WILL REPORT PAIN OF 0-2/10 DEMONSTRATING A REDUCTION OF OVERALL PAIN (Met)       Start:  11/20/24    Expected End:  05/20/25     Resolved:  01/22/25                 SUBJECTIVE:     Performing HEP?: Yes       Patient completed treatment with some difficulty and max effort.    OBJECTIVE:    Physical Observation:        Treatments:   This therapist instructed and demonstrated interventions to patient, patient completed the following under direct supervision of this therapist:      46 MINUTES  Therapeutic Exercise  Therapeutic Exercise Activity 1: Pendulum x 10 all 4 directions 7#  Therapeutic Exercise Activity 2: Pendulum w/ 7# weight x10 each direction  Therapeutic Exercise Activity 8: Supine flexion x 10  Therapeutic Exercise Activity 9: Sidelying abd x 10  Therapeutic Exercise Activity 10: AROM IR x 10, standing  Therapeutic Exercise Activity 11: Sidelying IR stretch, x10 at 30 seconbd stretches  Therapeutic Exercise Activity 16: Pulleys flexion AAROM up, and AROM down, x5 in flexion then abduction  Therapeutic Exercise Activity 17: Pulleys active as high as go go, with AAROM at end range as needed, then active down, x7 flexion, and x5 abduction    Manual Therapy  Manual Therapy Activity 1: scapulohumeral stretch x 4, x20 sec      Gayle Mullins, OTR/L #[id number]   2/5/2025

## 2025-02-05 NOTE — PROGRESS NOTES
Occupational Therapy  Occupational Therapy Treatment/re-check    Patient Name: Brigida Phillips  MRN: [id number]  Today's Date: 2/5/2025  Time Calculation  Start Time: 0801    Visit # 11       General Comment: Visit 11 for accident related finger.10-24-24    Assessment: Tissue mobility increased , Joint mobility/ROM increased, flexibility increased, muscle fatigue increased.  Pt completed session with some difficulty.           Plan: Progress with POC, As tolerated and monitor home program.        Current Problem  1. Hand pain, right  Follow Up In Occupational Therapy          Precautions       Subjective:   Patient reports The stiffness will get better right.    Pain   0/10    Performing HEP?: Yes    Objective:   All exercises held for 10 seconds unless noted otherwise   Treatments:   This therapist instructed and demonstrated interventions to patient, patient completed the following under direct supervision of this therapist:  Modalities:         MINUTES  3.3 MHz .8 W c/m2 for 8 minutes to in preparation for ROM/strength exercises       Therapeutic Exercise:   min    MINUTES  Therapeutic Exercise  Therapeutic Exercise Activity 13: Reverve blocking to ring finger.  Therapeutic Exercise Activity 14: MP flexion stretchx 5    Manual Therapy:         MINUTES  Manual Therapy  Manual Therapy Activity 1: IASTM to volar digit followed by STM.  Manual Therapy Activity 2: Digit distraction. with MP straight and bent to 90 x 10  Manual Therapy Activity 3: PIP joint mobs with MP flexed x 10    Therapeutic Activity:        MINUTES  Therapeutic Activity  Therapeutic Activity 1: Re-check  Therapeutic Activity 2: Discussed splint wear and coming in following wear next session for measurements.      Education: Pt educated on MP flexion PROM stretch.       Efrain Abarca, OT, CHT    Active       OT Goals       Patient will improve hand strength in order to perform self-care, household, work and or leisure tasks Right  Hand Strength  Goal;     40      #, Key Pinch   15      #, 3 Jaw Jai  15       #   (Progressing)       Start:  11/13/24    Expected End:  05/13/25         Goal Note        15  3 jaw- 9  Key 9 lbs              Patient will improve finger ROM in order to perform self-care, household, work and or leisure tasks, Right Finger ROM Goal (Total Active Motion)  Index 230  Degrees, Ring 230     Degrees, Long  230 Degrees, Small  230  Degrees.  (Met)       Start:  11/13/24    Expected End:  05/13/25    Resolved:  02/05/25      Goal Note       RF 2356    256 SF                Pt will demonstrate in improve in function by improving to 20.0 on the quick dash to signify increased independence with ADL'S with decreased pain.   (Met)       Start:  11/13/24    Expected End:  05/13/25    Resolved:  02/05/25      Goal Note       13.64                 OT Problem       PATIENT WILL DEMONSTRATE INDEPENDENCE IN HOME PROGRAM FOR SUPPORT OF PROGRESSION (Progressing)       Start:  11/13/24    Expected End:  05/13/25            PATIENT WILL REPORT PAIN OF 0-2/10 DEMONSTRATING A REDUCTION OF OVERALL PAIN (Met)       Start:  11/13/24    Expected End:  05/13/25    Resolved:  02/05/25

## 2025-02-06 RX ORDER — LANCETS 33 GAUGE
EACH MISCELLANEOUS
Qty: 100 EACH | Refills: 3 | Status: SHIPPED | OUTPATIENT
Start: 2025-02-06

## 2025-02-07 ENCOUNTER — TREATMENT (OUTPATIENT)
Dept: OCCUPATIONAL THERAPY | Facility: CLINIC | Age: 65
End: 2025-02-07
Payer: COMMERCIAL

## 2025-02-07 DIAGNOSIS — M79.641 HAND PAIN, RIGHT: ICD-10-CM

## 2025-02-07 DIAGNOSIS — M25.611 SHOULDER STIFFNESS, RIGHT: ICD-10-CM

## 2025-02-07 PROCEDURE — 97140 MANUAL THERAPY 1/> REGIONS: CPT | Mod: GO

## 2025-02-07 PROCEDURE — 97110 THERAPEUTIC EXERCISES: CPT | Mod: GO

## 2025-02-07 PROCEDURE — 97110 THERAPEUTIC EXERCISES: CPT | Mod: GO,CO

## 2025-02-07 PROCEDURE — 97035 APP MDLTY 1+ULTRASOUND EA 15: CPT | Mod: GO

## 2025-02-07 ASSESSMENT — PAIN SCALES - GENERAL: PAINLEVEL_OUTOF10: 0 - NO PAIN

## 2025-02-07 ASSESSMENT — PAIN - FUNCTIONAL ASSESSMENT: PAIN_FUNCTIONAL_ASSESSMENT: 0-10

## 2025-02-07 NOTE — PROGRESS NOTES
"    Occupational Therapy  Occupational Therapy Treatment    Patient Name: Brigida Phillips  MRN: [id number]  Today's Date: 2/7/2025  Time Calculation  Start Time: 0901  Stop Time: 0943  Time Calculation (min): 42 min    Insurance:  Visit number: 15 of 30  Shoulder: darren baum  no auth [id number], 80/20 coverage, 30v pt  OT Last Visit  OT Received On: 02/07/25    General  Reason for visit:   Referred By: Dr Sanchez  General Comment: Visit# 15 (Cesia)    Current Problem  1. Shoulder stiffness, right  Follow Up In Occupational Therapy          Precautions: R Shoulder post-op       Medical History Form: medical History Reviewed (scanned into chart)    Subjective:   \"Felling pretty good, just stiffness mostly - no pain today\"    Performing HEP?: Yes    Pain  Pain Assessment: 0-10  0-10 (Numeric) Pain Score: 0 - No pain  Pain Location: Shoulder  Pain Orientation: Right    Objective:   Physical Observation:   Measurements:    Treatments:   This therapist instructed and demonstrated interventions to patient, patient completed the following under direct supervision of this therapist:    Therapeutic Exercise:   min  42 MINUTES  Therapeutic Exercise  Therapeutic Exercise Activity 1: Seated hang x 10 8# 10 secomnd hold, short break between with wt resting in lap  Therapeutic Exercise Activity 2: Pendulum w/ 8# weight x10 each direction. following hanging weight at start of session and at end of session.  Therapeutic Exercise Activity 7: Standing IR pain free 10x, 5-10  Therapeutic Exercise Activity 8: Supine flexion x 10  Therapeutic Exercise Activity 9: Sidelying abd x 10  Therapeutic Exercise Activity 10: AROM IR x 10, standing  Therapeutic Exercise Activity 11: Sidelying IR stretch, 2 sets x5 at 30 second stretches (1st 5 no active reach behind, 2nd set active reach behind)  Therapeutic Exercise Activity 16: Pulleys flexion AAROM up, and AROM down, x5 in flexion then abduction  Therapeutic Exercise Activity 17: Pulleys active " as high as go go, with AAROM at end range as needed, then active down, x7 flexion, and x5 abduction  Therapeutic Exercise Activity 18: Supine alphabet A-Z x2 sets    ASSESSMENT  Brigida Phillips is making good improvements with her AROM.       PLAN:  To continue with current plan of care with emphasis on strengthening and shoulder ROM.  Patient to call if has problems.    Added alphabet in supine flexion with no weight to HEP every other day. Pt able to demo with handout and education.       R RF fracture Problems       R RF fracture Problems (Active)       OT Goals       Patient will improve hand strength in order to perform self-care, household, work and or leisure tasks Right  Hand Strength Goal;     40      #, Key Pinch   15      #, 3 Jaw Jai  15       #   (Progressing)       Start:  11/13/24    Expected End:  05/13/25         Goal Note        15  3 jaw- 9  Key 9 lbs              Patient will improve finger ROM in order to perform self-care, household, work and or leisure tasks, Right Finger ROM Goal (Total Active Motion)  Index 230  Degrees, Ring 230     Degrees, Long  230 Degrees, Small  230  Degrees.  (Met)       Start:  11/13/24    Expected End:  05/13/25    Resolved:  02/05/25      Goal Note       RF 2356    256 SF                Pt will demonstrate in improve in function by improving to 20.0 on the quick dash to signify increased independence with ADL'S with decreased pain.   (Met)       Start:  11/13/24    Expected End:  05/13/25    Resolved:  02/05/25      Goal Note       13.64                 OT Problem       PATIENT WILL DEMONSTRATE INDEPENDENCE IN HOME PROGRAM FOR SUPPORT OF PROGRESSION (Progressing)       Start:  11/13/24    Expected End:  05/13/25            PATIENT WILL REPORT PAIN OF 0-2/10 DEMONSTRATING A REDUCTION OF OVERALL PAIN (Met)       Start:  11/13/24    Expected End:  05/13/25    Resolved:  02/05/25              R shoulder RCR Problems       R shoulder RCR Problems  (Active)       OT Goals       Right Shoulder ROM goals:   Flexion 125  Degrees , Abduction 125  Degrees  , Extension  55 Degrees  , Internal Rotation  T12  , External Rotation  50 Degrees  (Progressing)       Start:  11/20/24    Expected End:  05/20/25         Goal Note       Flexion  68 Degrees , Abduction 69 Degrees , Extension 56 Degrees , Internal Rotation T12 , External Rotation 57 Degrees                  Patient will improve strengthening in order to perform self-care, household, work and or leisure tasks, Right Shoulder Strength Goal: Flexion /5 , Abduction /5  , Extension /5  ,Internal Rotation  /5 , External Rotation /5   4+/5 globally (Progressing)       Start:  11/20/24    Expected End:  05/20/25            Pt will demonstrate in improve in function by improving to 20.0 on the quick dash to signify increased independence with ADL'S with decreased pain.   (Met)       Start:  11/20/24    Expected End:  05/20/25    Resolved:  01/22/25      Goal Note       9.09                 OT Problem       PATIENT WILL DEMONSTRATE INDEPENDENCE IN HOME PROGRAM FOR SUPPORT OF PROGRESSION (Progressing)       Start:  11/20/24    Expected End:  05/20/25            PATIENT WILL REPORT PAIN OF 0-2/10 DEMONSTRATING A REDUCTION OF OVERALL PAIN (Met)       Start:  11/20/24    Expected End:  05/20/25    Resolved:  01/22/25                  HANDY Santos/SHANON

## 2025-02-07 NOTE — PROGRESS NOTES
Occupational Therapy  Occupational Therapy Treatment    Patient Name: Brigida Phillips  MRN: [id number]  Today's Date: 2/7/2025  Time Calculation  Start Time: 0802  Stop Time: 0846  Time Calculation (min): 44 min    Visit # 12       General Comment: Visit 12 accident related    Assessment: Tissue mobility increased , Joint mobility/ROM increased, flexibility increased, muscle fatigue increased ,   Pt completed session with some difficulty.           Plan: Progress with POC, As tolerated and monitor home program.        Current Problem  1. Hand pain, right  Follow Up In Occupational Therapy                 Subjective:   Patient reports I wore the splint before coming in.    Pain   No number given.     Performing HEP?: Yes    Objective:   All exercises held for 10 seconds unless noted otherwise   Treatments:   This therapist instructed and demonstrated interventions to patient, patient completed the following under direct supervision of this therapist:    Modalities:        8MINUTES  3.3 MHz .8 W c/m2 for 8 minutes to in preparation for ROM/strength exercises   To volar and dorsal RF.    Therapeutic Exercise:   min  20 MINUTES  Therapeutic Exercise  Therapeutic Exercise Activity 12: Hook to fist with blue highlighter 2  Therapeutic Exercise Activity 13: Reverve blocking to ring finger.  Therapeutic Exercise Activity 14: MP flexion stretch x 10, then 10 x composite flexion x 10.  Therapeutic Exercise Activity 15: Mass grasp x 10 grn  Therapeutic Exercise Activity 16: Putty rolling x 10  Therapeutic Exercise Activity 17: Opposition pinch x 10 grn    Manual Therapy:       16 MINUTES  Manual Therapy  Manual Therapy Activity 2: Digit distraction. with MP straight and bent to 90 x 10  Manual Therapy Activity 3: DIP/PIP joint mobs with MP flexed and straight x 10      Efrain Abarca, OT, CHT    Active       OT Goals       Patient will improve hand strength in order to perform self-care, household, work and or leisure tasks  Right  Hand Strength Goal;     40      #, Key Pinch   15      #, 3 Jaw Jai  15       #   (Progressing)       Start:  11/13/24    Expected End:  05/13/25         Goal Note        15  3 jaw- 9  Key 9 lbs              Patient will improve finger ROM in order to perform self-care, household, work and or leisure tasks, Right Finger ROM Goal (Total Active Motion)  Index 230  Degrees, Ring 230     Degrees, Long  230 Degrees, Small  230  Degrees.  (Met)       Start:  11/13/24    Expected End:  05/13/25    Resolved:  02/05/25      Goal Note       RF 2356    256 SF                Pt will demonstrate in improve in function by improving to 20.0 on the quick dash to signify increased independence with ADL'S with decreased pain.   (Met)       Start:  11/13/24    Expected End:  05/13/25    Resolved:  02/05/25      Goal Note       13.64                 OT Problem       PATIENT WILL DEMONSTRATE INDEPENDENCE IN HOME PROGRAM FOR SUPPORT OF PROGRESSION (Progressing)       Start:  11/13/24    Expected End:  05/13/25            PATIENT WILL REPORT PAIN OF 0-2/10 DEMONSTRATING A REDUCTION OF OVERALL PAIN (Met)       Start:  11/13/24    Expected End:  05/13/25    Resolved:  02/05/25

## 2025-02-12 ENCOUNTER — APPOINTMENT (OUTPATIENT)
Dept: OCCUPATIONAL THERAPY | Facility: CLINIC | Age: 65
End: 2025-02-12
Payer: COMMERCIAL

## 2025-02-15 DIAGNOSIS — I10 HYPERTENSION, UNSPECIFIED TYPE: ICD-10-CM

## 2025-02-17 RX ORDER — CARVEDILOL 6.25 MG/1
TABLET ORAL
Qty: 180 TABLET | Refills: 1 | Status: SHIPPED | OUTPATIENT
Start: 2025-02-17

## 2025-02-19 ENCOUNTER — TREATMENT (OUTPATIENT)
Dept: OCCUPATIONAL THERAPY | Facility: CLINIC | Age: 65
End: 2025-02-19
Payer: COMMERCIAL

## 2025-02-19 ENCOUNTER — TREATMENT (OUTPATIENT)
Dept: OCCUPATIONAL THERAPY | Facility: CLINIC | Age: 65
End: 2025-02-19

## 2025-02-19 DIAGNOSIS — M79.641 HAND PAIN, RIGHT: Primary | ICD-10-CM

## 2025-02-19 DIAGNOSIS — M25.611 SHOULDER STIFFNESS, RIGHT: ICD-10-CM

## 2025-02-19 PROCEDURE — 97110 THERAPEUTIC EXERCISES: CPT | Mod: GO

## 2025-02-19 PROCEDURE — 97140 MANUAL THERAPY 1/> REGIONS: CPT | Mod: GO

## 2025-02-19 PROCEDURE — 97035 APP MDLTY 1+ULTRASOUND EA 15: CPT | Mod: GO

## 2025-02-19 NOTE — PROGRESS NOTES
Occupational Therapy  Occupational Therapy Treatment    Patient Name: Brigida Phillips  MRN: [id number]  Today's Date: 2/19/2025  Time Calculation  Start Time: 1218  Stop Time: 1301  Time Calculation (min): 43 min    Visit # 13       General Comment: Visit 13 accident related    Assessment: Tissue mobility increased , Joint mobility/ROM increased, flexibility increased, muscle fatigue increased.    Pt completed session with some difficulty.           Plan: Progress with POC, As tolerated and monitor home program.        Current Problem  1. Hand pain, right  Follow Up In Occupational Therapy          Precautions N/A       Subjective:   Patient reports It's achy    Pain   0/10    Performing HEP?: Yes    Objective:   All exercises held for 10 seconds unless noted otherwise   Treatments:   This therapist instructed and demonstrated interventions to patient, patient completed the following under direct supervision of this therapist:  Modalities:        8MINUTES  3.3 MHz .8 W c/m2 for 8 minutes to in preparation for ROM/strength exercises   To volar and dorsal RF.     Therapeutic Exercise:   min  30 MINUTES  Therapeutic Exercise  Therapeutic Exercise Activity 13: Tendon glides x 10  Therapeutic Exercise Activity 14: Putty rolling x 10 yellow.  Therapeutic Exercise Activity 15: Intrinsic minus yellow putty x 10  Therapeutic Exercise Activity 16: Mass grasp x 10 yellow putty.  Therapeutic Exercise Activity 17: opposition pinching x 10 yellow.  Therapeutic Exercise Activity 18: Pronation x 10 red flex bars  Therapeutic Exercise Activity 19: Supination x 10 red flex bar.  Therapeutic Exercise Activity 20: towel scrunching    Manual Therapy:       12 MINUTES  Manual Therapy  Manual Therapy Activity 3: DIP/PIP joint mobs with MP flexed and straight x 10  Manual Therapy Activity 4: R RF distraction.    Therapeutic Activity:        MINUTES  Therapeutic Activity  Therapeutic Activity 2: -3 degrees Ext at PIP            Efrain LOPEZ  Rima, OT, CHT    Active       OT Goals       Patient will improve hand strength in order to perform self-care, household, work and or leisure tasks Right  Hand Strength Goal;     40      #, Key Pinch   15      #, 3 Jaw Jai  15       #   (Progressing)       Start:  11/13/24    Expected End:  05/13/25         Goal Note        15  3 jaw- 9  Key 9 lbs              Patient will improve finger ROM in order to perform self-care, household, work and or leisure tasks, Right Finger ROM Goal (Total Active Motion)  Index 230  Degrees, Ring 230     Degrees, Long  230 Degrees, Small  230  Degrees.  (Met)       Start:  11/13/24    Expected End:  05/13/25    Resolved:  02/05/25      Goal Note       RF 2356    256 SF                Pt will demonstrate in improve in function by improving to 20.0 on the quick dash to signify increased independence with ADL'S with decreased pain.   (Met)       Start:  11/13/24    Expected End:  05/13/25    Resolved:  02/05/25      Goal Note       13.64                 OT Problem       PATIENT WILL DEMONSTRATE INDEPENDENCE IN HOME PROGRAM FOR SUPPORT OF PROGRESSION (Progressing)       Start:  11/13/24    Expected End:  05/13/25            PATIENT WILL REPORT PAIN OF 0-2/10 DEMONSTRATING A REDUCTION OF OVERALL PAIN (Met)       Start:  11/13/24    Expected End:  05/13/25    Resolved:  02/05/25

## 2025-02-19 NOTE — PROGRESS NOTES
Occupational Therapy  Occupational Therapy Treatment    Patient Name: Brigida Phillips  MRN: [id number]  Today's Date: 2/19/2025  Time Calculation  Start Time: 1135  Stop Time: 1217  Time Calculation (min): 42 min    Visit # 16       General Comment: Visit# 16 (Shoulder)    Assessment:  Joint mobility/ROM increased, flexibility increased, muscle fatigue increased.  Pt completed session with some difficulty.           Plan: Progress with POC, As tolerated and monitor home program. Continue with scapulohumeral stretch.        Current Problem  1. Shoulder stiffness, right  Follow Up In Occupational Therapy          Precautions: RCR       Subjective:   Patient reports My shoulder feels good.     Pain   0/10  Performing HEP?: Yes    Objective:   All exercises held for 10 seconds unless noted otherwise   Treatments:   This therapist instructed and demonstrated interventions to patient, patient completed the following under direct supervision of this therapist:    Therapeutic Exercise:   min  30 MINUTES  Therapeutic Exercise  Therapeutic Exercise Activity 3: AAER x 5  Therapeutic Exercise Activity 4: AROM ER x 10  Therapeutic Exercise Activity 7: Sidelying abd x 10  Therapeutic Exercise Activity 8: Supine flexion x 10  Therapeutic Exercise Activity 9: Supine flexion with wedge 2#  x 10 then 1# x 10 2 sets.  Therapeutic Exercise Activity 12: Standing wall lift off x 10  Therapeutic Exercise Activity 16: Functional ER behind head x 10  Therapeutic Exercise Activity 17: Supine ABC's x 1 without weight.    Manual Therapy:       12 MINUTES  Manual Therapy  Manual Therapy Activity 1: scapulohumeral stretch x 8, x 10 sec  Manual Therapy Activity 2: Sidelying scapulohumeral stretch x 10        Education: Pt educated on supine wedge flexion with weight.       Efrain Abarca, OT, CHT    Active       OT Goals       Right Shoulder ROM goals:   Flexion 125  Degrees , Abduction 125  Degrees  , Extension  55 Degrees  , Internal Rotation   T12  , External Rotation  50 Degrees  (Progressing)       Start:  11/20/24    Expected End:  05/20/25         Goal Note       Flexion  68 Degrees , Abduction 69 Degrees , Extension 56 Degrees , Internal Rotation T12 , External Rotation 57 Degrees                  Patient will improve strengthening in order to perform self-care, household, work and or leisure tasks, Right Shoulder Strength Goal: Flexion /5 , Abduction /5  , Extension /5  ,Internal Rotation  /5 , External Rotation /5   4+/5 globally (Progressing)       Start:  11/20/24    Expected End:  05/20/25            Pt will demonstrate in improve in function by improving to 20.0 on the quick dash to signify increased independence with ADL'S with decreased pain.   (Met)       Start:  11/20/24    Expected End:  05/20/25    Resolved:  01/22/25      Goal Note       9.09                 OT Problem       PATIENT WILL DEMONSTRATE INDEPENDENCE IN HOME PROGRAM FOR SUPPORT OF PROGRESSION (Progressing)       Start:  11/20/24    Expected End:  05/20/25            PATIENT WILL REPORT PAIN OF 0-2/10 DEMONSTRATING A REDUCTION OF OVERALL PAIN (Met)       Start:  11/20/24    Expected End:  05/20/25    Resolved:  01/22/25

## 2025-02-21 ENCOUNTER — TREATMENT (OUTPATIENT)
Dept: OCCUPATIONAL THERAPY | Facility: CLINIC | Age: 65
End: 2025-02-21
Payer: COMMERCIAL

## 2025-02-21 DIAGNOSIS — M79.641 HAND PAIN, RIGHT: Primary | ICD-10-CM

## 2025-02-21 DIAGNOSIS — M25.611 SHOULDER STIFFNESS, RIGHT: ICD-10-CM

## 2025-02-21 PROCEDURE — 97140 MANUAL THERAPY 1/> REGIONS: CPT | Mod: GO

## 2025-02-21 PROCEDURE — 97110 THERAPEUTIC EXERCISES: CPT | Mod: GO

## 2025-02-21 PROCEDURE — 97530 THERAPEUTIC ACTIVITIES: CPT | Mod: GO

## 2025-02-21 NOTE — PROGRESS NOTES
Occupational Therapy  Occupational Therapy Treatment    Patient Name: Brigida Phillips  MRN: [id number]  Today's Date: 2/21/2025  Time Calculation  Start Time: 0800  Stop Time: 0841  Time Calculation (min): 41 min    Visit # 14       General Comment: Visit 14 accident related    Assessment: Tissue mobility increased , Joint mobility/ROM increased, flexibility increased, muscle fatigue increased.    Pt completed session with some difficulty.           Plan: Progress with POC, As tolerated and monitor home program.        Current Problem  1. Hand pain, right            Precautions N/A       Subjective:   Patient reports My finger doesn't hurt as much when you pull on it.     Pain   0/10 just stiffness      Performing HEP?: Yes    Objective:   All exercises held for 10 seconds unless noted otherwise   Treatments:   This therapist instructed and demonstrated interventions to patient, patient completed the following under direct supervision of this therapist:  Modalities:        8MINUTES  3.3 MHz .8 W c/m2 for 8 minutes to in preparation for ROM/strength exercises   To volar and dorsal RF.    Therapeutic Exercise:   min  23 MINUTES  Therapeutic Exercise  Therapeutic Exercise Activity 13: Digit flex 7.0 10 x 4 position.  Therapeutic Exercise Activity 14: Digit flex 5.0 10 x 4 position.  Therapeutic Exercise Activity 15: Intrinsic minus stretch x 10  Therapeutic Exercise Activity 16: Hook to fist x 10  Therapeutic Exercise Activity 17: Blocking to RF DIP/PIP x 10 each.  Therapeutic Exercise Activity 18: Intrinsic minus yellow putty x 10  Therapeutic Exercise Activity 19: Supination flexbar x 10 red  Therapeutic Exercise Activity 20: Pronation flexbar x 10 red    Manual Therapy:       10 MINUTES  Manual Therapy  Manual Therapy Activity 3: DIP/PIP joint mobs with MP flexed and straight x 10  Manual Therapy Activity 4: R RF distraction.        Education: Pt educated on where to obtain digit-flex.       Efrain Abarca, OT,  CHT    Active       OT Goals       Patient will improve hand strength in order to perform self-care, household, work and or leisure tasks Right  Hand Strength Goal;     40      #, Key Pinch   15      #, 3 Jaw Jai  15       #   (Progressing)       Start:  11/13/24    Expected End:  05/13/25         Goal Note        15  3 jaw- 9  Key 9 lbs              Patient will improve finger ROM in order to perform self-care, household, work and or leisure tasks, Right Finger ROM Goal (Total Active Motion)  Index 230  Degrees, Ring 230     Degrees, Long  230 Degrees, Small  230  Degrees.  (Met)       Start:  11/13/24    Expected End:  05/13/25    Resolved:  02/05/25      Goal Note       RF 2356    256 SF                Pt will demonstrate in improve in function by improving to 20.0 on the quick dash to signify increased independence with ADL'S with decreased pain.   (Met)       Start:  11/13/24    Expected End:  05/13/25    Resolved:  02/05/25      Goal Note       13.64                 OT Problem       PATIENT WILL DEMONSTRATE INDEPENDENCE IN HOME PROGRAM FOR SUPPORT OF PROGRESSION (Progressing)       Start:  11/13/24    Expected End:  05/13/25            PATIENT WILL REPORT PAIN OF 0-2/10 DEMONSTRATING A REDUCTION OF OVERALL PAIN (Met)       Start:  11/13/24    Expected End:  05/13/25    Resolved:  02/05/25

## 2025-02-21 NOTE — PROGRESS NOTES
Occupational Therapy  Occupational Therapy Treatment    Patient Name: Brigida Phillips  MRN: [id number]  Today's Date: 2/21/2025  Time Calculation  Start Time: 0842  Stop Time: 0928  Time Calculation (min): 46 min    Visit # 17      General Comment: Visit 17 no auth, 30 visit    Assessment: Tissue mobility increased , Joint mobility/ROM increased, flexibility increased, muscle fatigue increased.    Pt completed session with some difficulty.           Plan: Progress with POC, As tolerated and monitor home program.        Current Problem  1. Shoulder stiffness, right  Follow Up In Occupational Therapy          Precautions 12 + weeks post op.        Subjective:   Patient reports: My arm isn't bothering me.     Pain   0-10    Performing HEP?: Yes    Objective:   All exercises held for 10 seconds unless noted otherwise   Treatments:   This therapist instructed and demonstrated interventions to patient, patient completed the following under direct supervision of this therapist:      Therapeutic Exercise:   min  28 MINUTES  Therapeutic Exercise  Therapeutic Exercise Activity 2: Pendulum w/ 6weight x10 each direction. following hanging weight at start of session and at end of session.  Therapeutic Exercise Activity 3: AAER x 5  Therapeutic Exercise Activity 4: AROM ER x 10  Therapeutic Exercise Activity 7: Sidelying abd x 10  Therapeutic Exercise Activity 8: Supine flexion x 10  Therapeutic Exercise Activity 9: Supine flexion with wedge 2#  x 10 then 1# x 10 2 sets.  Therapeutic Exercise Activity 10: standing flexion to just above 90 degrees with emphasis on scapular retraction.    Manual Therapy:       10 MINUTES  Manual Therapy  Manual Therapy Activity 1: scapulohumeral stretch x 8, x 10 sec  Manual Therapy Activity 2: Sidelying scapulohumeral stretch x 10    Therapeutic Activity:      8 MINUTES  Therapeutic Activity  Therapeutic Activity 1: Shoulder arch both directions overhead seated at table.      Education: Cues for  scapular depression with standing and sidelying flexion.       Efrain Abarca, OT, CHT    Active       OT Goals       Right Shoulder ROM goals:   Flexion 125  Degrees , Abduction 125  Degrees  , Extension  55 Degrees  , Internal Rotation  T12  , External Rotation  50 Degrees  (Progressing)       Start:  11/20/24    Expected End:  05/20/25         Goal Note       Flexion  68 Degrees , Abduction 69 Degrees , Extension 56 Degrees , Internal Rotation T12 , External Rotation 57 Degrees                  Patient will improve strengthening in order to perform self-care, household, work and or leisure tasks, Right Shoulder Strength Goal: Flexion /5 , Abduction /5  , Extension /5  ,Internal Rotation  /5 , External Rotation /5   4+/5 globally (Progressing)       Start:  11/20/24    Expected End:  05/20/25            Pt will demonstrate in improve in function by improving to 20.0 on the quick dash to signify increased independence with ADL'S with decreased pain.   (Met)       Start:  11/20/24    Expected End:  05/20/25    Resolved:  01/22/25      Goal Note       9.09                 OT Problem       PATIENT WILL DEMONSTRATE INDEPENDENCE IN HOME PROGRAM FOR SUPPORT OF PROGRESSION (Progressing)       Start:  11/20/24    Expected End:  05/20/25            PATIENT WILL REPORT PAIN OF 0-2/10 DEMONSTRATING A REDUCTION OF OVERALL PAIN (Met)       Start:  11/20/24    Expected End:  05/20/25    Resolved:  01/22/25

## 2025-02-22 DIAGNOSIS — Z79.4 TYPE 2 DIABETES MELLITUS WITHOUT COMPLICATION, WITH LONG-TERM CURRENT USE OF INSULIN (MULTI): ICD-10-CM

## 2025-02-22 DIAGNOSIS — E11.9 TYPE 2 DIABETES MELLITUS WITHOUT COMPLICATION, WITH LONG-TERM CURRENT USE OF INSULIN (MULTI): ICD-10-CM

## 2025-02-26 ENCOUNTER — TREATMENT (OUTPATIENT)
Dept: OCCUPATIONAL THERAPY | Facility: CLINIC | Age: 65
End: 2025-02-26
Payer: COMMERCIAL

## 2025-02-26 DIAGNOSIS — G89.29 CHRONIC RIGHT SHOULDER PAIN: ICD-10-CM

## 2025-02-26 DIAGNOSIS — M79.641 HAND PAIN, RIGHT: ICD-10-CM

## 2025-02-26 DIAGNOSIS — M25.511 CHRONIC RIGHT SHOULDER PAIN: ICD-10-CM

## 2025-02-26 DIAGNOSIS — M25.611 SHOULDER STIFFNESS, RIGHT: Primary | ICD-10-CM

## 2025-02-26 PROCEDURE — 97530 THERAPEUTIC ACTIVITIES: CPT | Mod: GO

## 2025-02-26 PROCEDURE — 97110 THERAPEUTIC EXERCISES: CPT | Mod: GO

## 2025-02-26 PROCEDURE — 97140 MANUAL THERAPY 1/> REGIONS: CPT | Mod: GO

## 2025-02-26 NOTE — PROGRESS NOTES
Occupational Therapy  Occupational Therapy Treatment    Patient Name: Brigida Phillips  MRN: [id number]  Today's Date: 2/26/2025  Time Calculation  Start Time: 0846  Stop Time: 0928  Time Calculation (min): 42 min    Visit # 18     General Comment: Visit 18 no auth, 30 visit    Assessment: Joint mobility/ROM increased, flexibility increased, muscle fatigue increased.    Pt completed session with some difficulty.           Plan: Progress with POC, As tolerated and monitor home program.        Current Problem  1. Shoulder stiffness, right  Follow Up In Occupational Therapy      2. Chronic right shoulder pain            Precautions N/A       Subjective:   Patient reports my shoulder is getting stronger.    Pain   0/10    Performing HEP?: Yes    Objective:   All exercises held for 10 seconds unless noted otherwise   Treatments:   This therapist instructed and demonstrated interventions to patient, patient completed the following under direct supervision of this therapist:      Therapeutic Exercise:   min  30 MINUTES  Therapeutic Exercise  Therapeutic Exercise Activity 2: Pendulum w/ 6weight x10 each direction. following hanging weight at start of session and at end of session.  Therapeutic Exercise Activity 3: AAER x 5  Therapeutic Exercise Activity 4: AROM ER x 10  Therapeutic Exercise Activity 6: AROM Ext x 10  Therapeutic Exercise Activity 7: Sidelying abd x 10 (ther assist at trap for tactile cue.)  Therapeutic Exercise Activity 8: Supine flexion x 10  Therapeutic Exercise Activity 9: Supine double wedge flexion x 15 with 2# with break. (Therapist cueing at upper trapfor first 7.)  Therapeutic Exercise Activity 14: Digit flex 10 x 2 positions (flexion/scaption)    Manual Therapy:         MINUTES  Manual Therapy  Manual Therapy Activity 1: scapulohumeral stretch x 8, x 10 sec    Therapeutic Activity:      12 MINUTES  Therapeutic Activity  Therapeutic Activity 1: Shoulder arch both directions overhead seated at  table.  Therapeutic Activity 2: Supine ABC's x 1#            Efrain LOPEZ Rima, OT, CHT    Active       OT Goals       Right Shoulder ROM goals:   Flexion 125  Degrees , Abduction 125  Degrees  , Extension  55 Degrees  , Internal Rotation  T12  , External Rotation  50 Degrees  (Progressing)       Start:  11/20/24    Expected End:  05/20/25         Goal Note       Flexion  68 Degrees , Abduction 69 Degrees , Extension 56 Degrees , Internal Rotation T12 , External Rotation 57 Degrees                  Patient will improve strengthening in order to perform self-care, household, work and or leisure tasks, Right Shoulder Strength Goal: Flexion /5 , Abduction /5  , Extension /5  ,Internal Rotation  /5 , External Rotation /5   4+/5 globally (Progressing)       Start:  11/20/24    Expected End:  05/20/25            Pt will demonstrate in improve in function by improving to 20.0 on the quick dash to signify increased independence with ADL'S with decreased pain.   (Met)       Start:  11/20/24    Expected End:  05/20/25    Resolved:  01/22/25      Goal Note       9.09                 OT Problem       PATIENT WILL DEMONSTRATE INDEPENDENCE IN HOME PROGRAM FOR SUPPORT OF PROGRESSION (Progressing)       Start:  11/20/24    Expected End:  05/20/25            PATIENT WILL REPORT PAIN OF 0-2/10 DEMONSTRATING A REDUCTION OF OVERALL PAIN (Met)       Start:  11/20/24    Expected End:  05/20/25    Resolved:  01/22/25
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
## 2025-02-28 ENCOUNTER — TREATMENT (OUTPATIENT)
Dept: OCCUPATIONAL THERAPY | Facility: CLINIC | Age: 65
End: 2025-02-28
Payer: COMMERCIAL

## 2025-02-28 DIAGNOSIS — M79.641 HAND PAIN, RIGHT: ICD-10-CM

## 2025-02-28 DIAGNOSIS — M25.611 SHOULDER STIFFNESS, RIGHT: ICD-10-CM

## 2025-02-28 PROCEDURE — 97110 THERAPEUTIC EXERCISES: CPT | Mod: GO,CO

## 2025-02-28 PROCEDURE — 97140 MANUAL THERAPY 1/> REGIONS: CPT | Mod: GO,CO

## 2025-02-28 ASSESSMENT — PAIN - FUNCTIONAL ASSESSMENT
PAIN_FUNCTIONAL_ASSESSMENT: 0-10
PAIN_FUNCTIONAL_ASSESSMENT: 0-10

## 2025-02-28 ASSESSMENT — PAIN SCALES - GENERAL
PAINLEVEL_OUTOF10: 0 - NO PAIN
PAINLEVEL_OUTOF10: 2
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
## 2025-03-04 ENCOUNTER — TREATMENT (OUTPATIENT)
Dept: OCCUPATIONAL THERAPY | Facility: CLINIC | Age: 65
End: 2025-03-04
Payer: COMMERCIAL

## 2025-03-04 DIAGNOSIS — M25.611 SHOULDER STIFFNESS, RIGHT: ICD-10-CM

## 2025-03-04 DIAGNOSIS — M79.641 HAND PAIN, RIGHT: Primary | ICD-10-CM

## 2025-03-04 PROCEDURE — 97110 THERAPEUTIC EXERCISES: CPT | Mod: GO

## 2025-03-04 PROCEDURE — 97140 MANUAL THERAPY 1/> REGIONS: CPT | Mod: GO
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
## 2025-03-07 ENCOUNTER — TREATMENT (OUTPATIENT)
Dept: OCCUPATIONAL THERAPY | Facility: CLINIC | Age: 65
End: 2025-03-07
Payer: COMMERCIAL

## 2025-03-07 DIAGNOSIS — M79.641 HAND PAIN, RIGHT: ICD-10-CM

## 2025-03-07 DIAGNOSIS — M25.611 SHOULDER STIFFNESS, RIGHT: ICD-10-CM

## 2025-03-07 PROCEDURE — 97140 MANUAL THERAPY 1/> REGIONS: CPT | Mod: GO

## 2025-03-07 PROCEDURE — 97530 THERAPEUTIC ACTIVITIES: CPT | Mod: GO

## 2025-03-07 PROCEDURE — 97110 THERAPEUTIC EXERCISES: CPT | Mod: GO

## 2025-03-07 RX ORDER — LANCETS 33 GAUGE
EACH MISCELLANEOUS
Qty: 400 EACH | Refills: 3 | Status: SHIPPED | OUTPATIENT
Start: 2025-03-07
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
## 2025-03-07 NOTE — PROGRESS NOTES
Occupational Therapy  Occupational Therapy Treatment/Re-check    Patient Name: Brigida Phillips  MRN: [id number]  Today's Date: 3/7/2025  Time Calculation  Start Time: 0930  Stop Time: 1011  Time Calculation (min): 41 min    Visit # 21       General Comment: Visit 20 of 30 Post-op shoulder    Assessment:  Joint mobility/ROM increased, flexibility increased, muscle fatigue increased.    Pt completed session with some difficulty.           Plan: Progress with POC, As tolerated and monitor home program.        Current Problem  1. Shoulder stiffness, right  Follow Up In Occupational Therapy          Precautions Daniel RCR.        Subjective:   Patient reports It's harder to lift overhead in standing.     Pain   0/10    Performing HEP?: Yes    Objective:   All exercises held for 10 seconds unless noted otherwise   Treatments:   This therapist instructed and demonstrated interventions to patient, patient completed the following under direct supervision of this therapist:    Therapeutic Exercise:   min  25 MINUTES  Therapeutic Exercise  Therapeutic Exercise Performed: Yes  Therapeutic Exercise Activity 2: Pendulum w/ 6# weight x10 each direction. following hanging weight at start of session and at end of session.  Therapeutic Exercise Activity 3: Supine flexion with dowel x 10  Therapeutic Exercise Activity 4: Serratus punch in supine wedge 2# x 10  Therapeutic Exercise Activity 5: Supine wedge horizontal abd/add x 10 1#.  Therapeutic Exercise Activity 6: AROM Ext x 10  Therapeutic Exercise Activity 7: Sidelying Abd x10 (with ther assist at trap for tactile cues)  Therapeutic Exercise Activity 8: Supine flexion AROM x 10  Therapeutic Exercise Activity 9: Supine double wedge flexion x 10 2#  Therapeutic Exercise Activity 11: Seated ER x10 with 10 sec holds  Therapeutic Exercise Activity 14: Digit flex 10 x 4 positions 7.0  Therapeutic Exercise Activity 15: Standing wall stretch x 10 at 20 sec.  Therapeutic Exercise Activity 17:  Standing wall stretch with active lowering x 10  Therapeutic Exercise Activity 18: IR x 5 arom    Manual Therapy:       8 MINUTES  Manual Therapy  Manual Therapy Activity 1: scapulohumeral stretch x6  w 10 sec  Manual Therapy Activity 2: Sidelying scapulohumeral stretch x 8    Therapeutic Activity:      8 MINUTES  Therapeutic Activity  Therapeutic Activity 1: Re-check  Therapeutic Activity 2: Shoulder arch both directions overhead in standingRe-check      Education: Pt educated on supine horizontal abd/add and supine serratus punch. Cues to stand with back against wall with overhead flexion to avoid compensation.       Efrain Abarca, OT, CHT    Active       OT Goals       Right Shoulder ROM goals:   Flexion 125  Degrees , Abduction 125  Degrees  , Extension  55 Degrees  , Internal Rotation  T12  , External Rotation  50 Degrees  (Progressing)       Start:  11/20/24    Expected End:  05/20/25         Goal Note       Flexion 108 Degrees , Abduction 77 Degrees , Extension 56 Degrees , Internal Rotation T12 , External Rotation 50               Patient will improve strengthening in order to perform self-care, household, work and or leisure tasks, Right Shoulder Strength Goal: Flexion /5 , Abduction /5  , Extension /5  ,Internal Rotation  /5 , External Rotation /5   4+/5 globally (Progressing)       Start:  11/20/24    Expected End:  05/20/25         Goal Note       Flex 4/5  Abd 4/5  ER 4+/5  IR4+/5 with pain  Ext 5/5              Pt will demonstrate in improve in function by improving to 20.0 on the quick dash to signify increased independence with ADL'S with decreased pain.   (Met)       Start:  11/20/24    Expected End:  05/20/25    Resolved:  01/22/25      Goal Note       9.09                 OT Problem       PATIENT WILL DEMONSTRATE INDEPENDENCE IN HOME PROGRAM FOR SUPPORT OF PROGRESSION (Progressing)       Start:  11/20/24    Expected End:  05/20/25            PATIENT WILL REPORT PAIN OF 0-2/10 DEMONSTRATING A  REDUCTION OF OVERALL PAIN (Met)       Start:  11/20/24    Expected End:  05/20/25    Resolved:  01/22/25

## 2025-03-11 ENCOUNTER — TREATMENT (OUTPATIENT)
Dept: OCCUPATIONAL THERAPY | Facility: CLINIC | Age: 65
End: 2025-03-11

## 2025-03-11 ENCOUNTER — TREATMENT (OUTPATIENT)
Dept: OCCUPATIONAL THERAPY | Facility: CLINIC | Age: 65
End: 2025-03-11
Payer: COMMERCIAL

## 2025-03-11 DIAGNOSIS — G89.29 CHRONIC RIGHT SHOULDER PAIN: ICD-10-CM

## 2025-03-11 DIAGNOSIS — M79.641 HAND PAIN, RIGHT: ICD-10-CM

## 2025-03-11 DIAGNOSIS — M25.511 CHRONIC RIGHT SHOULDER PAIN: ICD-10-CM

## 2025-03-11 DIAGNOSIS — M25.611 SHOULDER STIFFNESS, RIGHT: Primary | ICD-10-CM

## 2025-03-11 PROCEDURE — 97140 MANUAL THERAPY 1/> REGIONS: CPT | Mod: GO

## 2025-03-11 PROCEDURE — 97110 THERAPEUTIC EXERCISES: CPT | Mod: GO

## 2025-03-11 PROCEDURE — 97035 APP MDLTY 1+ULTRASOUND EA 15: CPT | Mod: GO

## 2025-03-11 NOTE — PROGRESS NOTES
Occupational Therapy  Occupational Therapy Treatment    Patient Name: Brigida Phillips  MRN: [id number]  Today's Date: 3/11/2025  Time Calculation  Start Time: 0930  Stop Time: 1012  Time Calculation (min): 42 min    Visit # 22       General Comment: Visit 21 of 30 Post-op shoulder    Assessment: Joint mobility/ROM increased, flexibility increased, muscle fatigue increased.    Pt completed session with some difficulty.           Plan: Progress with POC, As tolerated and monitor home program.        Current Problem  1. Shoulder stiffness, right  Follow Up In Occupational Therapy      2. Chronic right shoulder pain            Precautions N/A       Subjective:   Patient reports It feels good. I see chastity soon.     Pain   0/10    Performing HEP?: Yes    Objective:   All exercises held for 10 seconds unless noted otherwise   Treatments:   This therapist instructed and demonstrated interventions to patient, patient completed the following under direct supervision of this therapist:    Therapeutic Exercise:   min  34 MINUTES  Therapeutic Exercise  Therapeutic Exercise Performed: Yes  Therapeutic Exercise Activity 2: Pendulum w/ 7# weight x10 each direction. following hanging weight at start of session and at end of session.  Therapeutic Exercise Activity 4: Serratus punch in supine wedge 2# x 10  Therapeutic Exercise Activity 5: Supine wedge horizontal abd/add x 10 1#.  Therapeutic Exercise Activity 7: Sidelying Abd x10 (with ther assist at trap for tactile cues)  Therapeutic Exercise Activity 8: Supine flexion AROM x 10  Therapeutic Exercise Activity 9: Supine double wedge flexion x 20 2#  Therapeutic Exercise Activity 11: Seated ER x10 with 10 sec holds  Therapeutic Exercise Activity 13: Sidelying ER x 10 with 1#, then 10x with 2#  Therapeutic Exercise Activity 14: Digit flex 10 x 4 positions 7.0  Therapeutic Exercise Activity 15: Standing wall stretch x 7 at 20 sec.  Therapeutic Exercise Activity 16: Standing wall lift off  x 10  Therapeutic Exercise Activity 17: Standing wall stretch with active lowering x 10  Therapeutic Exercise Activity 19: standing flexion 90 deg 1# x 10  Therapeutic Exercise Activity 20: standing flexion full motion    Manual Therapy:       8 MINUTES  Manual Therapy  Manual Therapy Activity 1: scapulohumeral stretch x6  w 10 sec  Manual Therapy Activity 2: Sidelying scapulohumeral stretch x 5    Education: Cues for scapular depression with standing flexion against gravity.        Efrain Abarca, OT, CHT    Active       OT Goals       Right Shoulder ROM goals:   Flexion 125  Degrees , Abduction 125  Degrees  , Extension  55 Degrees  , Internal Rotation  T12  , External Rotation  50 Degrees  (Progressing)       Start:  11/20/24    Expected End:  05/20/25         Goal Note       Flexion 108 Degrees , Abduction 77 Degrees , Extension 56 Degrees , Internal Rotation T12 , External Rotation 50               Patient will improve strengthening in order to perform self-care, household, work and or leisure tasks, Right Shoulder Strength Goal: Flexion /5 , Abduction /5  , Extension /5  ,Internal Rotation  /5 , External Rotation /5   4+/5 globally (Progressing)       Start:  11/20/24    Expected End:  05/20/25         Goal Note       Flex 4/5  Abd 4/5  ER 4+/5  IR4+/5 with pain  Ext 5/5              Pt will demonstrate in improve in function by improving to 20.0 on the quick dash to signify increased independence with ADL'S with decreased pain.   (Met)       Start:  11/20/24    Expected End:  05/20/25    Resolved:  01/22/25      Goal Note       9.09                 OT Problem       PATIENT WILL DEMONSTRATE INDEPENDENCE IN HOME PROGRAM FOR SUPPORT OF PROGRESSION (Progressing)       Start:  11/20/24    Expected End:  05/20/25            PATIENT WILL REPORT PAIN OF 0-2/10 DEMONSTRATING A REDUCTION OF OVERALL PAIN (Met)       Start:  11/20/24    Expected End:  05/20/25    Resolved:  01/22/25
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
## 2025-03-12 ENCOUNTER — APPOINTMENT (OUTPATIENT)
Dept: ORTHOPEDIC SURGERY | Facility: CLINIC | Age: 65
End: 2025-03-12
Payer: COMMERCIAL

## 2025-03-12 VITALS — BODY MASS INDEX: 33.95 KG/M2 | HEIGHT: 68 IN | WEIGHT: 224 LBS

## 2025-03-12 DIAGNOSIS — Z98.890 S/P RIGHT ROTATOR CUFF REPAIR: Primary | ICD-10-CM

## 2025-03-12 PROCEDURE — 3008F BODY MASS INDEX DOCD: CPT

## 2025-03-12 PROCEDURE — 99212 OFFICE O/P EST SF 10 MIN: CPT

## 2025-03-12 ASSESSMENT — PAIN - FUNCTIONAL ASSESSMENT: PAIN_FUNCTIONAL_ASSESSMENT: NO/DENIES PAIN

## 2025-03-12 NOTE — PATIENT INSTRUCTIONS
BMI was above normal measurement. Current weight: 102 kg (224 lb)  Weight change since last visit (-) denotes wt loss -14 lbs   Weight loss needed to achieve BMI 25: 59.9 Lbs  Weight loss needed to achieve BMI 30: 27.1 Lbs  Advised to Increase physical activity.

## 2025-03-12 NOTE — PROGRESS NOTES
History of Present Illness      Chief Complaint   Patient presents with    Right Shoulder - Post-op         64 y.o. female is 4 1/2 months status post right shoulder scope with rotator cuff repair and biceps tenodesis.  Patient states that she continues to see improvements in physical therapy.  She continues to work on range of motion and strengthening. She denies any pain today. Not taking anything for pain. She is back to her normal activities without any issues. She denies any numbness tingling or concerns with her incision.  She is extremely happy with how things are going at this time.     Review of Systems   GENERAL: Negative for malaise, significant weight loss, fever, chills  MUSCULOSKELETAL: see HPI  NEURO:  Negative     Exam  right shoulder incisions are clean dry intact well-healed. No evidence of infection today.  There is no tenderness palpation on exam today.  Range of motion of the right shoulder is 125 degrees of forward flexion abduction, 75 degrees of external rotation and internally rotates to L3  No tenderness palpation along the bicipital groove.  Negative neers and zelaya. No pain with jobes testing and reasonable strength today. SILT. UE is NVI.     Assessment  Patient is 4 1/2 months status post right shoulder scope with rotator cuff repair and biceps tenodesis.       Plan  Discussed further management with patient today. Patient should continue to work on range of motion and gradual strenthening in PT.  Patient plans to finish out her scheduled PT and then progress on her own. Patient was given exercises to do in addition to PT. Can take over the counter tylenol or nsaids for any discomfort. Continue to increase activities as tolerable. Patient can follow up as needed regarding this issue. If she has any concerns or questions she can call the office for further evaluation.

## 2025-03-14 ENCOUNTER — TREATMENT (OUTPATIENT)
Dept: OCCUPATIONAL THERAPY | Facility: CLINIC | Age: 65
End: 2025-03-14
Payer: COMMERCIAL

## 2025-03-14 DIAGNOSIS — M25.611 SHOULDER STIFFNESS, RIGHT: ICD-10-CM

## 2025-03-14 DIAGNOSIS — M79.641 HAND PAIN, RIGHT: ICD-10-CM

## 2025-03-14 PROCEDURE — 97110 THERAPEUTIC EXERCISES: CPT | Mod: GO

## 2025-03-14 PROCEDURE — 97140 MANUAL THERAPY 1/> REGIONS: CPT | Mod: GO

## 2025-03-14 PROCEDURE — 97110 THERAPEUTIC EXERCISES: CPT | Mod: GO,CO

## 2025-03-14 PROCEDURE — 97140 MANUAL THERAPY 1/> REGIONS: CPT | Mod: GO,CO

## 2025-03-14 ASSESSMENT — PAIN SCALES - GENERAL: PAINLEVEL_OUTOF10: 0 - NO PAIN

## 2025-03-14 ASSESSMENT — PAIN - FUNCTIONAL ASSESSMENT: PAIN_FUNCTIONAL_ASSESSMENT: 0-10
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
## 2025-03-14 NOTE — PROGRESS NOTES
"    Occupational Therapy  Occupational Therapy Treatment    Patient Name: Brigida Phillips  MRN: [id number]  Today's Date: 3/14/2025  Time Calculation  Start Time: 0725  Stop Time: 0820  Time Calculation (min): 55 min    Insurance:  Visit number: 22 of 30  Authorization info: post-op  Insurance Type: [place]  OT Last Visit  OT Received On: 03/14/25    General  Reason for visit:  Reason for Referral: R shoulder arthroscopy, rotator cuff repair, biceps tenodesis, extensive debridement, subacromial decompression with partial acromioplasty; R Ring finger Metacarpal and P1 fracture  Referred By: Dr Sanchez  Past Medical History Relevant to Rehab: 6 1/2 weeks post op  General Comment: Visit 22 of 30 Post-op    Current Problem  1. Shoulder stiffness, right  Follow Up In Occupational Therapy          Precautions: None 6/12 weeks post-op       Medical History Form: medical History Reviewed (scanned into chart)    Subjective:    \"No real pain anymore, just achy and discomfort. I can do so much more than I did now at home without pain.\"      Performing HEP?: Yes    Pain  Pain Assessment: 0-10  0-10 (Numeric) Pain Score: 0 - No pain    Objective:   Physical Observation:   Measurements:    Treatments:   This therapist instructed and demonstrated interventions to patient, patient completed the following under direct supervision of this therapist:    Therapeutic Exercise:   min  35 MINUTES  Therapeutic Exercise  Therapeutic Exercise Performed: Yes  Therapeutic Exercise Activity 1: Seated hang x 10 8# 10 second hold, short break between each with wt resting in lap  Therapeutic Exercise Activity 2: Pendulum w/ 7# weight x10 each direction. following hanging weight at start of session and at end of session.  Therapeutic Exercise Activity 4: Serratus punch in supine wedge 2# x 10  Therapeutic Exercise Activity 7: Sidelying Abd x10 (with ther assist at trap for tactile cues)  Therapeutic Exercise Activity 8: Supine flexion AROM x " 10  Therapeutic Exercise Activity 9: Supine double wedge flexion x 20 2#  Therapeutic Exercise Activity 11: Sidelying ER against gravity x10 with 10 sec holds  Therapeutic Exercise Activity 13: Sidelying ER x 10 with 1#, then 15x with 2#    Manual Therapy:       20 MINUTES  Manual Therapy  Manual Therapy Performed: Yes  Manual Therapy Activity 1: scapulohumeral stretch x6 w 20 sec  Manual Therapy Activity 2: Sidelying scapulohumeral stretch x 5, 20 seconds  Manual Therapy Activity 3: Sidelying muscle energy press and hold 5 seconds adduction at  90 degrees with active abduction overhead x10    Assessment:   Joint mobility/ROM increased, flexibility increased, muscle fatigue increased. Reduction in soreness post exercises and improving functional use in ADL/IADL per pt report.     Pt completed session with some difficulty.          Plan: Progress with POC, As tolerated and monitor home program.        Active       OT Goals       Right Shoulder ROM goals:   Flexion 125  Degrees , Abduction 125  Degrees  , Extension  55 Degrees  , Internal Rotation  T12  , External Rotation  50 Degrees  (Progressing)       Start:  11/20/24    Expected End:  05/20/25         Goal Note       Flexion 108 Degrees , Abduction 77 Degrees , Extension 56 Degrees , Internal Rotation T12 , External Rotation 50               Patient will improve strengthening in order to perform self-care, household, work and or leisure tasks, Right Shoulder Strength Goal: Flexion /5 , Abduction /5  , Extension /5  ,Internal Rotation  /5 , External Rotation /5   4+/5 globally (Progressing)       Start:  11/20/24    Expected End:  05/20/25         Goal Note       Flex 4/5  Abd 4/5  ER 4+/5  IR4+/5 with pain  Ext 5/5              Pt will demonstrate in improve in function by improving to 20.0 on the quick dash to signify increased independence with ADL'S with decreased pain.   (Met)       Start:  11/20/24    Expected End:  05/20/25    Resolved:  01/22/25      Goal  Note       9.09                 OT Problem       PATIENT WILL DEMONSTRATE INDEPENDENCE IN HOME PROGRAM FOR SUPPORT OF PROGRESSION (Progressing)       Start:  11/20/24    Expected End:  05/20/25            PATIENT WILL REPORT PAIN OF 0-2/10 DEMONSTRATING A REDUCTION OF OVERALL PAIN (Met)       Start:  11/20/24    Expected End:  05/20/25    Resolved:  01/22/25           KARRI Santos/SHANON

## 2025-03-18 ENCOUNTER — TREATMENT (OUTPATIENT)
Dept: OCCUPATIONAL THERAPY | Facility: CLINIC | Age: 65
End: 2025-03-18
Payer: COMMERCIAL

## 2025-03-18 DIAGNOSIS — M79.641 HAND PAIN, RIGHT: ICD-10-CM

## 2025-03-18 PROCEDURE — 97035 APP MDLTY 1+ULTRASOUND EA 15: CPT | Mod: GO

## 2025-03-18 PROCEDURE — 97110 THERAPEUTIC EXERCISES: CPT | Mod: GO

## 2025-03-18 ASSESSMENT — PAIN SCALES - GENERAL: PAINLEVEL_OUTOF10: 0 - NO PAIN

## 2025-03-18 ASSESSMENT — PAIN - FUNCTIONAL ASSESSMENT: PAIN_FUNCTIONAL_ASSESSMENT: 0-10
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
## 2025-03-21 ENCOUNTER — TREATMENT (OUTPATIENT)
Dept: OCCUPATIONAL THERAPY | Facility: CLINIC | Age: 65
End: 2025-03-21
Payer: COMMERCIAL

## 2025-03-21 DIAGNOSIS — M25.611 SHOULDER STIFFNESS, RIGHT: ICD-10-CM

## 2025-03-21 ASSESSMENT — PAIN SCALES - GENERAL: PAINLEVEL_OUTOF10: 0 - NO PAIN

## 2025-03-21 ASSESSMENT — PAIN - FUNCTIONAL ASSESSMENT: PAIN_FUNCTIONAL_ASSESSMENT: 0-10

## 2025-03-21 NOTE — PROGRESS NOTES
"    Occupational Therapy  Occupational Therapy Treatment    Patient Name: Brigida Phillips  MRN: [id number]  Today's Date: 3/21/2025  Time Calculation  Start Time: 0730  Stop Time: 0813  Time Calculation (min): 43 min    Insurance:  Visit number: 23 of 30  Authorization info: None  Insurance Type: [place] [facility]  OT Last Visit  OT Received On: 03/21/25    General  Reason for visit:  Reason for Referral: R shoulder arthroscopy, rotator cuff repair, biceps tenodesis, extensive debridement, subacromial decompression with partial acromioplasty  Referred By: Dr Sanchez  Past Medical History Relevant to Rehab: 71/2 Weeks post-op Surg. 10-  General Comment: Visit 23 of 30 Post-op    Current Problem  1. Shoulder stiffness, right  Follow Up In Occupational Therapy          Precautions:   Precautions Comment: 71/2 weeks post-op    Medical History Form: medical History Reviewed (scanned into chart)    Subjective:   \"Still weak, but feeling better\"    Performing HEP?: Yes    Pain  Pain Assessment: 0-10  0-10 (Numeric) Pain Score: 0 - No pain  Pain Location: Shoulder  Pain Orientation: Right    Objective:   Physical Observation:   Measurements:    Treatments:   This therapist instructed and demonstrated interventions to patient, patient completed the following under direct supervision of this therapist:    Therapeutic Exercise:   min  43 MINUTES  Therapeutic Exercise  Therapeutic Exercise Performed: Yes  Therapeutic Exercise Activity 2: Pendulum w/ 8# weight x10 each direction. following hanging weight at start of session and at end of session.  Therapeutic Exercise Activity 4: Serratus punch in supine double wedge 2# 2 sets x 10  Therapeutic Exercise Activity 5: Supine wedge horizontal abd/add 2 sets x 10 1#.  Therapeutic Exercise Activity 7: Sidelying Abd x10 then 1# x10 (with ther assist at trap for tactile cues)  Therapeutic Exercise Activity 9: Supine double wedge flexion x 20 2#  Therapeutic Exercise Activity 13: Sidelying " ER x 10 with 2#, then 20x with 1#  Therapeutic Exercise Activity 14: Digit flex 10 x 4 positions 7.0  Therapeutic Exercise Activity 15: Standing wall stretch x 7 at 20 sec.  Therapeutic Exercise Activity 16: Standing wall lift off x 10  Therapeutic Exercise Activity 17: Standing wall stretch with active lowering x 10  Therapeutic Exercise Activity 19: standing flexion 90 deg 1# x 10  Therapeutic Exercise Activity 20: standing flexion full motion x10    Manual Therapy:         MINUTES Billed with there-ex  Manual Therapy  Manual Therapy Activity 1: scapulohumeral stretch x5 w 10 sec hold  Manual Therapy Activity 2: Sidelying scapulohumeral stretch x 5    Assessment:   Joint mobility/ROM increased, flexibility increased, muscle fatigue increased. Reduction in soreness post exercises and improving functional use in ADL/IADL per pt report.     Pt completed session with some difficulty, mostly fatigue with some compensatory shoulder elevation corrected in exercises.        Plan: Progress with POC, As tolerated and monitor home program.        Active       OT Goals       Right Shoulder ROM goals:   Flexion 125  Degrees , Abduction 125  Degrees  , Extension  55 Degrees  , Internal Rotation  T12  , External Rotation  50 Degrees  (Progressing)       Start:  11/20/24    Expected End:  05/20/25         Goal Note       Flexion 108 Degrees , Abduction 77 Degrees , Extension 56 Degrees , Internal Rotation T12 , External Rotation 50               Patient will improve strengthening in order to perform self-care, household, work and or leisure tasks, Right Shoulder Strength Goal: Flexion /5 , Abduction /5  , Extension /5  ,Internal Rotation  /5 , External Rotation /5   4+/5 globally (Progressing)       Start:  11/20/24    Expected End:  05/20/25         Goal Note       Flex 4/5  Abd 4/5  ER 4+/5  IR4+/5 with pain  Ext 5/5              Pt will demonstrate in improve in function by improving to 20.0 on the quick dash to signify  increased independence with ADL'S with decreased pain.   (Met)       Start:  11/20/24    Expected End:  05/20/25    Resolved:  01/22/25      Goal Note       9.09                 OT Problem       PATIENT WILL DEMONSTRATE INDEPENDENCE IN HOME PROGRAM FOR SUPPORT OF PROGRESSION (Progressing)       Start:  11/20/24    Expected End:  05/20/25            PATIENT WILL REPORT PAIN OF 0-2/10 DEMONSTRATING A REDUCTION OF OVERALL PAIN (Met)       Start:  11/20/24    Expected End:  05/20/25    Resolved:  01/22/25           KARRI Sanots/SHANON

## 2025-03-28 ENCOUNTER — APPOINTMENT (OUTPATIENT)
Dept: OCCUPATIONAL THERAPY | Facility: CLINIC | Age: 65
End: 2025-03-28
Payer: COMMERCIAL

## 2025-04-01 ENCOUNTER — TREATMENT (OUTPATIENT)
Dept: OCCUPATIONAL THERAPY | Facility: CLINIC | Age: 65
End: 2025-04-01
Payer: COMMERCIAL

## 2025-04-01 DIAGNOSIS — M79.641 HAND PAIN, RIGHT: ICD-10-CM

## 2025-04-01 PROCEDURE — 97035 APP MDLTY 1+ULTRASOUND EA 15: CPT | Mod: GO

## 2025-04-01 PROCEDURE — 97110 THERAPEUTIC EXERCISES: CPT | Mod: GO
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
## 2025-04-02 ENCOUNTER — TREATMENT (OUTPATIENT)
Dept: OCCUPATIONAL THERAPY | Facility: CLINIC | Age: 65
End: 2025-04-02
Payer: COMMERCIAL

## 2025-04-02 DIAGNOSIS — M25.611 SHOULDER STIFFNESS, RIGHT: ICD-10-CM

## 2025-04-02 PROCEDURE — 97530 THERAPEUTIC ACTIVITIES: CPT | Mod: GO

## 2025-04-02 PROCEDURE — 97110 THERAPEUTIC EXERCISES: CPT | Mod: GO

## 2025-04-02 NOTE — PROGRESS NOTES
Occupational Therapy  Occupational Therapy Treatment/Discharge    Patient Name: Brigida Phillips  MRN: [id number]  Today's Date: 4/2/2025  Time Calculation  Start Time: 0846  Stop Time: 0927  Time Calculation (min): 41 min    Visit # 25       General Comment: Visit 25 of 30 Post-op    Assessment:  Joint mobility/ROM increased, flexibility increased, muscle fatigue increased. Pt has met or come close to meeting all goals.     Pt completed session with some difficulty.           Plan: Discharge from OT with continued HEP.        Current Problem  1. Shoulder stiffness, right  Follow Up In Occupational Therapy          Precautions N/A       Subjective:   Patient reports When are we going to be done with the shoulder.     Pain   0/10    Performing HEP?: Yes    Objective:   All exercises held for 10 seconds unless noted otherwise   Treatments:   This therapist instructed and demonstrated interventions to patient, patient completed the following under direct supervision of this therapist:  Therapeutic Exercise:   min  21 MINUTES  Therapeutic Exercise  Therapeutic Exercise Performed: Yes  Therapeutic Exercise Activity 2: Pendulum w/ 8# weight x10 each direction. following hanging weight at start of session and at end of session.  Therapeutic Exercise Activity 5: AROM Ext x 10  Therapeutic Exercise Activity 6: AROM ER x 10  Therapeutic Exercise Activity 7: Sidelying Abd x10  Therapeutic Exercise Activity 8: Standing Abd x 10  Therapeutic Exercise Activity 9: Supine double wedge flexion x 20 2#  Therapeutic Exercise Activity 10: AROM IR x 5  Therapeutic Exercise Activity 14: Digit flex 10 x 4 positions 7.0  Therapeutic Exercise Activity 15: Standing wall stretch x 7 at 20 sec.  Therapeutic Exercise Activity 16: Standing wall lift off x 10  Therapeutic Exercise Activity 17: Standing wall stretch with active lowering x 10  Therapeutic Exercise Activity 20: standing flexion full motion x10    Therapeutic Activity:      20  MINUTES  Therapeutic Activity  Therapeutic Activity 1: Re-check  Therapeutic Activity 2: Shoulder arch both directions overhead in standing with 1/2# weight 2 sets      Education: Pt educated on how to continue with HEP.       Efrain Abarca, OT, CHT    Resolved       OT Goals       Right Shoulder ROM goals:   Flexion 125  Degrees , Abduction 125  Degrees  , Extension  55 Degrees  , Internal Rotation  T12  , External Rotation  50 Degrees  (Adequate for Discharge)       Start:  11/20/24    Expected End:  05/20/25         Goal Note       Flexion 135  Abd 123  Ext 63  ER 70  IR T10              Patient will improve strengthening in order to perform self-care, household, work and or leisure tasks, Right Shoulder Strength Goal: Flexion /5 , Abduction /5  , Extension /5  ,Internal Rotation  /5 , External Rotation /5   4+/5 globally (Met)       Start:  11/20/24    Expected End:  05/20/25    Resolved:  04/02/25      Goal Note       Flex 5/5  Ext 5/5  Abd 4+/5  ER 4+/5  IR 5/5              Pt will demonstrate in improve in function by improving to 20.0 on the quick dash to signify increased independence with ADL'S with decreased pain.   (Met)       Start:  11/20/24    Expected End:  05/20/25    Resolved:  01/22/25      Goal Note       9.09                 OT Problem       PATIENT WILL DEMONSTRATE INDEPENDENCE IN HOME PROGRAM FOR SUPPORT OF PROGRESSION (Met)       Start:  11/20/24    Expected End:  05/20/25    Resolved:  04/02/25         PATIENT WILL REPORT PAIN OF 0-2/10 DEMONSTRATING A REDUCTION OF OVERALL PAIN (Met)       Start:  11/20/24    Expected End:  05/20/25    Resolved:  01/22/25

## 2025-04-04 ENCOUNTER — TREATMENT (OUTPATIENT)
Dept: OCCUPATIONAL THERAPY | Facility: CLINIC | Age: 65
End: 2025-04-04
Payer: COMMERCIAL

## 2025-04-04 ENCOUNTER — APPOINTMENT (OUTPATIENT)
Dept: OCCUPATIONAL THERAPY | Facility: CLINIC | Age: 65
End: 2025-04-04
Payer: COMMERCIAL

## 2025-04-04 DIAGNOSIS — M79.641 HAND PAIN, RIGHT: ICD-10-CM

## 2025-04-04 PROCEDURE — 97140 MANUAL THERAPY 1/> REGIONS: CPT | Mod: GO

## 2025-04-04 PROCEDURE — 97110 THERAPEUTIC EXERCISES: CPT | Mod: GO
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
## 2025-04-07 ENCOUNTER — APPOINTMENT (OUTPATIENT)
Dept: ORTHOPEDIC SURGERY | Facility: CLINIC | Age: 65
End: 2025-04-07
Payer: COMMERCIAL

## 2025-04-07 ENCOUNTER — HOSPITAL ENCOUNTER (OUTPATIENT)
Dept: RADIOLOGY | Facility: HOSPITAL | Age: 65
Discharge: HOME | End: 2025-04-07
Payer: COMMERCIAL

## 2025-04-07 DIAGNOSIS — S62.344A CLOSED NONDISPLACED FRACTURE OF BASE OF FOURTH METACARPAL BONE OF RIGHT HAND, INITIAL ENCOUNTER: ICD-10-CM

## 2025-04-07 PROCEDURE — 73130 X-RAY EXAM OF HAND: CPT | Mod: RT

## 2025-04-07 PROCEDURE — 99213 OFFICE O/P EST LOW 20 MIN: CPT | Performed by: ORTHOPAEDIC SURGERY

## 2025-04-07 PROCEDURE — 73130 X-RAY EXAM OF HAND: CPT | Mod: RIGHT SIDE | Performed by: RADIOLOGY

## 2025-04-07 ASSESSMENT — PAIN - FUNCTIONAL ASSESSMENT: PAIN_FUNCTIONAL_ASSESSMENT: NO/DENIES PAIN

## 2025-04-08 NOTE — PROGRESS NOTES
History of Present Illness  Chief Complaint   Patient presents with    Right Hand - Post-op     10/16/24 R 4 th MC shaft FX and R R finger prox phalanx fracture      Patient reports that she is overall doing well.  She does note some stiffness into her ring finger, but no pain at this time.  She has continued working with therapy.      Examination  Constitutional: Appears well-developed and well-nourished.  Head: Normocephalic and atraumatic.  Eyes: EOMI grossly  Cardiovascular: Intact distal pulses.   Respiratory: Effort normal. No respiratory distress.  Neurologic: Alert and oriented to person, place, and time.  Skin: Skin is warm and dry.  Hematologic / Lymphatic: No lymphedema, lymphangitis.  Psychiatric: normal mood and affect. Behavior is normal.   Musculoskeletal:  Right hand:  Incisions are well healed. Sensation grossly intact distally.  Capillary refill less than 2 seconds.  Good rotational alignment of the ring finger.  No crepitus with MCP motion.  0.5 cm DPC with 0-65 degrees MCP flexion and 5-105 degrees PIP flexion  No tenderness about metacarpal or MCP joint/proximal phalanx    Right hand radiographs ordered and available for my review demonstrate maintained alignment of fourth metacarpal shaft and ring finger proximal phalanx fracture.  Hardware intact.     Assessment:  Patient status post right fourth metacarpal and right ring finger proximal phalanx operative fixation     Plan  Radiographs were reviewed with patient.  We discussed that some stiffness following this type of injury can be very normal and may be long-term.  It is encouraging that she is not having any pain/discomfort.  She will continue with progression of activities as tolerated and she will follow-up in the future as needed.  We once again reviewed potential risk of posttraumatic arthritis in the future.

## 2025-04-10 ENCOUNTER — APPOINTMENT (OUTPATIENT)
Dept: OCCUPATIONAL THERAPY | Facility: CLINIC | Age: 65
End: 2025-04-10
Payer: COMMERCIAL

## 2025-04-15 ENCOUNTER — TREATMENT (OUTPATIENT)
Dept: OCCUPATIONAL THERAPY | Facility: CLINIC | Age: 65
End: 2025-04-15
Payer: COMMERCIAL

## 2025-04-15 DIAGNOSIS — S62.614B OPEN DISPLACED FRACTURE OF PROXIMAL PHALANX OF RIGHT RING FINGER, INITIAL ENCOUNTER: ICD-10-CM

## 2025-04-15 DIAGNOSIS — M79.641 HAND PAIN, RIGHT: ICD-10-CM

## 2025-04-15 DIAGNOSIS — M79.641 RIGHT HAND PAIN: ICD-10-CM

## 2025-04-15 DIAGNOSIS — M25.511 RIGHT SHOULDER PAIN, UNSPECIFIED CHRONICITY: ICD-10-CM

## 2025-04-15 PROCEDURE — 97035 APP MDLTY 1+ULTRASOUND EA 15: CPT | Mod: GO

## 2025-04-15 PROCEDURE — 97110 THERAPEUTIC EXERCISES: CPT | Mod: GO
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
## 2025-04-17 ENCOUNTER — TREATMENT (OUTPATIENT)
Dept: OCCUPATIONAL THERAPY | Facility: CLINIC | Age: 65
End: 2025-04-17
Payer: COMMERCIAL

## 2025-04-17 DIAGNOSIS — M79.641 RIGHT HAND PAIN: ICD-10-CM

## 2025-04-17 DIAGNOSIS — M79.641 HAND PAIN, RIGHT: ICD-10-CM

## 2025-04-17 DIAGNOSIS — M25.511 RIGHT SHOULDER PAIN, UNSPECIFIED CHRONICITY: ICD-10-CM

## 2025-04-17 DIAGNOSIS — S62.614B OPEN DISPLACED FRACTURE OF PROXIMAL PHALANX OF RIGHT RING FINGER, INITIAL ENCOUNTER: ICD-10-CM

## 2025-04-17 PROCEDURE — 97530 THERAPEUTIC ACTIVITIES: CPT | Mod: GO

## 2025-04-17 PROCEDURE — 97110 THERAPEUTIC EXERCISES: CPT | Mod: GO

## 2025-04-17 PROCEDURE — 97140 MANUAL THERAPY 1/> REGIONS: CPT | Mod: GO
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
## 2025-04-22 ENCOUNTER — TREATMENT (OUTPATIENT)
Dept: OCCUPATIONAL THERAPY | Facility: CLINIC | Age: 65
End: 2025-04-22
Payer: COMMERCIAL

## 2025-04-22 DIAGNOSIS — M79.641 RIGHT HAND PAIN: ICD-10-CM

## 2025-04-22 DIAGNOSIS — M25.511 RIGHT SHOULDER PAIN, UNSPECIFIED CHRONICITY: ICD-10-CM

## 2025-04-22 DIAGNOSIS — S62.614B OPEN DISPLACED FRACTURE OF PROXIMAL PHALANX OF RIGHT RING FINGER, INITIAL ENCOUNTER: ICD-10-CM

## 2025-04-22 PROCEDURE — 97140 MANUAL THERAPY 1/> REGIONS: CPT | Mod: GO

## 2025-04-22 PROCEDURE — 97110 THERAPEUTIC EXERCISES: CPT | Mod: GO

## 2025-04-22 NOTE — PROGRESS NOTES
Occupational Therapy  Occupational Therapy Treatment    Patient Name: Brigida Phillips  MRN: [id number]  Treatment Date: 4/22/2025  Visit # 27  Time Calculation  Start Time: 0715  Stop Time: 0758  Time Calculation (min): 43 min          TREATING DIAGNOSIS:  1. Right shoulder pain, unspecified chronicity  Follow Up In Occupational Therapy      2. Open displaced fracture of proximal phalanx of right ring finger, initial encounter  Follow Up In Occupational Therapy      3. Right hand pain  Follow Up In Occupational Therapy          ASSESSMENT     Brigida Phillips is making good  improvements with her functional use, but still feels very stiff       PLAN:  To continue with current plan of care with emphasis on mp extnesion.  Patient to call if has problems.        Active       OT Goals       Patient will improve hand strength in order to perform self-care, household, work and or leisure tasks Right  Hand Strength Goal;     40      #, Key Pinch   15      #, 3 Jaw Jai  15       #   (Progressing)       Start:  11/13/24    Expected End:  05/13/25            Patient will improve finger ROM in order to perform self-care, household, work and or leisure tasks, Right Finger ROM Goal (Total Active Motion)  Index 230  Degrees, Ring 230     Degrees, Long  230 Degrees, Small  230  Degrees.  (Met)       Start:  11/13/24    Expected End:  05/13/25    Resolved:  02/05/25      Goal Note       RF 2356    256 SF                Pt will demonstrate in improve in function by improving to 20.0 on the quick dash to signify increased independence with ADL'S with decreased pain.   (Met)       Start:  11/13/24    Expected End:  05/13/25    Resolved:  02/05/25      Goal Note       13.64                 OT Problem       PATIENT WILL DEMONSTRATE INDEPENDENCE IN HOME PROGRAM FOR SUPPORT OF PROGRESSION (Progressing)       Start:  11/13/24    Expected End:  05/13/25            PATIENT WILL REPORT PAIN OF 0-2/10 DEMONSTRATING A  REDUCTION OF OVERALL PAIN (Met)       Start:  11/13/24    Expected End:  05/13/25    Resolved:  02/05/25                 SUBJECTIVE:     Performing HEP?: Yes       Patient completed treatment with some difficulty and max effort.    OBJECTIVE:    Physical Observation:     Precautions: none       Treatments:   This therapist instructed and demonstrated interventions to patient, patient completed the following under direct supervision of this therapist:      28 MINUTES  Therapeutic Exercise  Therapeutic Exercise Activity 2: Tendon glides.x5  Therapeutic Exercise Activity 4: ORL stretch MP and PIP ext with PROM DIP for x 5 at 30 seconds  Therapeutic Exercise Activity 5: MP flexion stretch against table with IP's straight.. 5 x 30  Therapeutic Exercise Activity 8: full fist w/ wrist flexion, x30 sec x5 reps  Therapeutic Exercise Activity 9: mp hyper extension, with ips flexed, x5 at 30 seconds  Therapeutic Exercise Activity 10: mp extnesion stretch using other hand, 5 at 30 sec.  15 MINUTES  Manual Therapy  Manual Therapy Activity 1: Manual therapy for edema and pain using hivamat on soft tissue edema, x15 minutes      Gayle Mullins, OTR/L #[id number]   4/22/2025

## 2025-04-23 ENCOUNTER — APPOINTMENT (OUTPATIENT)
Dept: ENDOCRINOLOGY | Facility: CLINIC | Age: 65
End: 2025-04-23
Payer: COMMERCIAL

## 2025-04-23 VITALS
DIASTOLIC BLOOD PRESSURE: 69 MMHG | SYSTOLIC BLOOD PRESSURE: 110 MMHG | HEART RATE: 72 BPM | WEIGHT: 227 LBS | HEIGHT: 68 IN | BODY MASS INDEX: 34.4 KG/M2

## 2025-04-23 DIAGNOSIS — E11.9 TYPE 2 DIABETES MELLITUS WITHOUT COMPLICATION, WITH LONG-TERM CURRENT USE OF INSULIN: Primary | ICD-10-CM

## 2025-04-23 DIAGNOSIS — Z79.4 TYPE 2 DIABETES MELLITUS WITHOUT COMPLICATION, WITH LONG-TERM CURRENT USE OF INSULIN: Primary | ICD-10-CM

## 2025-04-23 LAB — POC HEMOGLOBIN A1C: 4.9 % (ref 4.2–6.5)

## 2025-04-23 PROCEDURE — 3078F DIAST BP <80 MM HG: CPT | Performed by: INTERNAL MEDICINE

## 2025-04-23 PROCEDURE — 83036 HEMOGLOBIN GLYCOSYLATED A1C: CPT | Performed by: INTERNAL MEDICINE

## 2025-04-23 PROCEDURE — 3008F BODY MASS INDEX DOCD: CPT | Performed by: INTERNAL MEDICINE

## 2025-04-23 PROCEDURE — 99214 OFFICE O/P EST MOD 30 MIN: CPT | Performed by: INTERNAL MEDICINE

## 2025-04-23 PROCEDURE — 3074F SYST BP LT 130 MM HG: CPT | Performed by: INTERNAL MEDICINE

## 2025-04-23 PROCEDURE — 3044F HG A1C LEVEL LT 7.0%: CPT | Performed by: INTERNAL MEDICINE

## 2025-04-23 RX ORDER — TIRZEPATIDE 12.5 MG/.5ML
12.5 INJECTION, SOLUTION SUBCUTANEOUS
Qty: 2 ML | Refills: 11 | Status: SHIPPED | OUTPATIENT
Start: 2025-04-23

## 2025-04-23 ASSESSMENT — ENCOUNTER SYMPTOMS
NAUSEA: 0
SHORTNESS OF BREATH: 0
COUGH: 0
POLYDIPSIA: 0
APPETITE CHANGE: 0
VOMITING: 0
DIARRHEA: 0
ARTHRALGIAS: 0
FREQUENCY: 0
NERVOUS/ANXIOUS: 0
HEADACHES: 0
CONSTIPATION: 0
SORE THROAT: 0
FEVER: 0
ABDOMINAL PAIN: 0

## 2025-04-23 NOTE — PATIENT INSTRUCTIONS
RECOMMENDATIONS  Stop LANTUS  Increase Mounjaro to 12.5 mg/week    Follow up 3-4 months  Bring written glucose record (2 weeks' worth) to all appointments.
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
## 2025-04-24 ENCOUNTER — APPOINTMENT (OUTPATIENT)
Dept: OCCUPATIONAL THERAPY | Facility: CLINIC | Age: 65
End: 2025-04-24
Payer: COMMERCIAL

## 2025-04-29 ENCOUNTER — TREATMENT (OUTPATIENT)
Dept: OCCUPATIONAL THERAPY | Facility: CLINIC | Age: 65
End: 2025-04-29
Payer: COMMERCIAL

## 2025-04-29 DIAGNOSIS — M25.511 RIGHT SHOULDER PAIN, UNSPECIFIED CHRONICITY: ICD-10-CM

## 2025-04-29 DIAGNOSIS — M79.641 RIGHT HAND PAIN: ICD-10-CM

## 2025-04-29 DIAGNOSIS — S62.614B OPEN DISPLACED FRACTURE OF PROXIMAL PHALANX OF RIGHT RING FINGER, INITIAL ENCOUNTER: ICD-10-CM

## 2025-04-29 PROCEDURE — 97110 THERAPEUTIC EXERCISES: CPT | Mod: GO

## 2025-04-29 PROCEDURE — 97140 MANUAL THERAPY 1/> REGIONS: CPT | Mod: GO
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
## 2025-05-01 ENCOUNTER — CLINICAL SUPPORT (OUTPATIENT)
Dept: OCCUPATIONAL THERAPY | Facility: CLINIC | Age: 65
End: 2025-05-01
Payer: COMMERCIAL

## 2025-05-01 DIAGNOSIS — M79.641 RIGHT HAND PAIN: ICD-10-CM

## 2025-05-01 DIAGNOSIS — M25.511 RIGHT SHOULDER PAIN, UNSPECIFIED CHRONICITY: ICD-10-CM

## 2025-05-01 DIAGNOSIS — S62.614B OPEN DISPLACED FRACTURE OF PROXIMAL PHALANX OF RIGHT RING FINGER, INITIAL ENCOUNTER: ICD-10-CM

## 2025-05-01 PROCEDURE — 97530 THERAPEUTIC ACTIVITIES: CPT | Mod: GO

## 2025-05-01 PROCEDURE — 97140 MANUAL THERAPY 1/> REGIONS: CPT | Mod: GO
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
## 2025-05-09 ENCOUNTER — APPOINTMENT (OUTPATIENT)
Dept: OCCUPATIONAL THERAPY | Facility: CLINIC | Age: 65
End: 2025-05-09
Payer: COMMERCIAL

## 2025-05-15 ENCOUNTER — APPOINTMENT (OUTPATIENT)
Dept: OCCUPATIONAL THERAPY | Facility: CLINIC | Age: 65
End: 2025-05-15
Payer: COMMERCIAL

## 2025-05-21 ENCOUNTER — APPOINTMENT (OUTPATIENT)
Dept: OCCUPATIONAL THERAPY | Facility: CLINIC | Age: 65
End: 2025-05-21
Payer: COMMERCIAL

## 2025-05-27 ENCOUNTER — APPOINTMENT (OUTPATIENT)
Dept: OCCUPATIONAL THERAPY | Facility: CLINIC | Age: 65
End: 2025-05-27
Payer: COMMERCIAL

## 2025-05-29 DIAGNOSIS — E11.65 TYPE 2 DIABETES MELLITUS WITH HYPERGLYCEMIA, WITHOUT LONG-TERM CURRENT USE OF INSULIN: ICD-10-CM

## 2025-05-29 NOTE — TELEPHONE ENCOUNTER
Prescription Request        Has The Patient Been Identified By Name And Date Of Birth: Yes    RX Requestor: Patient    Date of Last Refill: 04/2024    Date Of Last Office Visit:  04/23/2025    Date Of Future Office Visit: 08/12/2025

## 2025-05-30 RX ORDER — BLOOD-GLUCOSE METER
EACH MISCELLANEOUS
Qty: 300 STRIP | Refills: 3 | Status: SHIPPED | OUTPATIENT
Start: 2025-05-30

## 2025-06-02 ENCOUNTER — TELEPHONE (OUTPATIENT)
Facility: CLINIC | Age: 65
End: 2025-06-02
Payer: COMMERCIAL

## 2025-06-02 NOTE — TELEPHONE ENCOUNTER
Patient called has the stomach flu.  Is having to use the bathroom every 2 hours.  Immodium tried.  Not helping anything else you could suggest.

## 2025-06-10 ENCOUNTER — APPOINTMENT (OUTPATIENT)
Facility: CLINIC | Age: 65
End: 2025-06-10
Payer: COMMERCIAL

## 2025-06-18 DIAGNOSIS — E78.5 HYPERLIPIDEMIA, UNSPECIFIED HYPERLIPIDEMIA TYPE: ICD-10-CM

## 2025-06-18 RX ORDER — ROSUVASTATIN CALCIUM 20 MG/1
20 TABLET, COATED ORAL DAILY
Qty: 90 TABLET | Refills: 3 | Status: SHIPPED | OUTPATIENT
Start: 2025-06-18

## 2025-06-26 ENCOUNTER — APPOINTMENT (OUTPATIENT)
Dept: PULMONOLOGY | Facility: CLINIC | Age: 65
End: 2025-06-26
Payer: COMMERCIAL

## 2025-07-01 ENCOUNTER — APPOINTMENT (OUTPATIENT)
Facility: CLINIC | Age: 65
End: 2025-07-01
Payer: COMMERCIAL

## 2025-07-01 VITALS
DIASTOLIC BLOOD PRESSURE: 80 MMHG | SYSTOLIC BLOOD PRESSURE: 124 MMHG | OXYGEN SATURATION: 96 % | BODY MASS INDEX: 33.04 KG/M2 | WEIGHT: 218 LBS | HEIGHT: 68 IN | HEART RATE: 69 BPM

## 2025-07-01 DIAGNOSIS — L30.4 INTERTRIGO: ICD-10-CM

## 2025-07-01 DIAGNOSIS — F41.9 ANXIETY: Primary | ICD-10-CM

## 2025-07-01 PROCEDURE — 1159F MED LIST DOCD IN RCRD: CPT

## 2025-07-01 PROCEDURE — 3074F SYST BP LT 130 MM HG: CPT

## 2025-07-01 PROCEDURE — 3008F BODY MASS INDEX DOCD: CPT

## 2025-07-01 PROCEDURE — 99213 OFFICE O/P EST LOW 20 MIN: CPT

## 2025-07-01 PROCEDURE — 3079F DIAST BP 80-89 MM HG: CPT

## 2025-07-01 PROCEDURE — 3044F HG A1C LEVEL LT 7.0%: CPT

## 2025-07-01 RX ORDER — FLUOXETINE 10 MG/1
10 CAPSULE ORAL DAILY
Qty: 30 CAPSULE | Refills: 0 | Status: SHIPPED | OUTPATIENT
Start: 2025-07-01 | End: 2025-07-31

## 2025-07-01 RX ORDER — CLOTRIMAZOLE AND BETAMETHASONE DIPROPIONATE 10; .64 MG/G; MG/G
1 CREAM TOPICAL 2 TIMES DAILY
Qty: 15 G | Refills: 1 | Status: SHIPPED | OUTPATIENT
Start: 2025-07-01

## 2025-07-01 ASSESSMENT — PATIENT HEALTH QUESTIONNAIRE - PHQ9
SUM OF ALL RESPONSES TO PHQ9 QUESTIONS 1 AND 2: 0
2. FEELING DOWN, DEPRESSED OR HOPELESS: NOT AT ALL
1. LITTLE INTEREST OR PLEASURE IN DOING THINGS: NOT AT ALL

## 2025-07-01 NOTE — PROGRESS NOTES
I reviewed and examined the patient. I was present for the key exam elements, and I fully participated in the patient's care. I discussed the management of the care with the resident. I have personally reviewed the pertinent labs and imaging, as well as recent notes, with the patient. I have reviewed the note above and agree with the resident's medical decision making as documented in the resident's note, in addition to the following comments / findings:     Agree with the rest of the plan outlined below by resident physician. No red flags.      The patient understands and agrees to the assessment and plan of care. Patient has also agreed to follow up and comply with the treatment and evaluation as recommended today. Patient was instructed to call the office at [phone] should questions arise regarding their treatment or care.     Jean Paul Hightower DO, FAOASM  Family Medicine   [address], [address]  [place] [address]     Jean Paul Hightower DO

## 2025-07-01 NOTE — ASSESSMENT & PLAN NOTE
Given symptoms not improved on lexapro, will titrate down to 10 mg daily and then discontinue the medication. Start Prozac 10 mg in one week and follow-up with me in 2-4 weeks to increase dose as needed.
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
## 2025-07-01 NOTE — PATIENT INSTRUCTIONS
Please cut your lexapro dose in half to 10 mg for one week and then discontinue the medication. In one week, start Prozac 10 mg daily. Please follow-up with me in 2-4 weeks so that we can titrate up the dose as needed.
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
## 2025-07-22 ENCOUNTER — APPOINTMENT (OUTPATIENT)
Facility: CLINIC | Age: 65
End: 2025-07-22
Payer: COMMERCIAL

## 2025-07-22 DIAGNOSIS — F41.9 ANXIETY: Primary | ICD-10-CM

## 2025-07-22 PROCEDURE — 1159F MED LIST DOCD IN RCRD: CPT

## 2025-07-22 PROCEDURE — 3044F HG A1C LEVEL LT 7.0%: CPT

## 2025-07-22 PROCEDURE — 99213 OFFICE O/P EST LOW 20 MIN: CPT

## 2025-07-22 RX ORDER — FLUOXETINE 20 MG/1
20 CAPSULE ORAL DAILY
Qty: 30 CAPSULE | Refills: 1 | Status: SHIPPED | OUTPATIENT
Start: 2025-07-22 | End: 2025-09-20

## 2025-07-22 NOTE — ASSESSMENT & PLAN NOTE
We increased your Prozac dose to 20 mg daily. Please reach out in a two months if you have any issues. Annual physical is due this upcoming November

## 2025-07-23 NOTE — PROGRESS NOTES
I reviewed and examined the patient. I was present for the key exam elements, and I fully participated in the patient's care. I discussed the management of the care with the resident. I have personally reviewed the pertinent labs and imaging, as well as recent notes, with the patient. I have reviewed the note above and agree with the resident's medical decision making as documented in the resident's note, in addition to the following comments / findings:     Agree with the rest of the plan outlined below by resident physician. No red flags.      The patient understands and agrees to the assessment and plan of care. Patient has also agreed to follow up and comply with the treatment and evaluation as recommended today. Patient was instructed to call the office at [phone] should questions arise regarding their treatment or care.     Jean Paul Hightower DO, FAOASM  Family Medicine   [address], [address]  [place] [address]     Jean Paul Hightower DO

## 2025-07-29 ENCOUNTER — APPOINTMENT (OUTPATIENT)
Facility: CLINIC | Age: 65
End: 2025-07-29
Payer: COMMERCIAL

## 2025-08-12 ENCOUNTER — APPOINTMENT (OUTPATIENT)
Dept: ENDOCRINOLOGY | Facility: CLINIC | Age: 65
End: 2025-08-12
Payer: COMMERCIAL

## 2025-08-12 VITALS
HEART RATE: 68 BPM | WEIGHT: 212 LBS | DIASTOLIC BLOOD PRESSURE: 75 MMHG | BODY MASS INDEX: 32.23 KG/M2 | SYSTOLIC BLOOD PRESSURE: 114 MMHG

## 2025-08-12 DIAGNOSIS — E11.9 TYPE 2 DIABETES MELLITUS WITHOUT COMPLICATION, WITH LONG-TERM CURRENT USE OF INSULIN: ICD-10-CM

## 2025-08-12 DIAGNOSIS — Z79.4 TYPE 2 DIABETES MELLITUS WITHOUT COMPLICATION, WITH LONG-TERM CURRENT USE OF INSULIN: ICD-10-CM

## 2025-08-12 LAB — POC HEMOGLOBIN A1C: 5 % (ref 4.2–6.5)

## 2025-08-12 PROCEDURE — 3078F DIAST BP <80 MM HG: CPT | Performed by: INTERNAL MEDICINE

## 2025-08-12 PROCEDURE — 99214 OFFICE O/P EST MOD 30 MIN: CPT | Performed by: INTERNAL MEDICINE

## 2025-08-12 PROCEDURE — 83036 HEMOGLOBIN GLYCOSYLATED A1C: CPT | Performed by: INTERNAL MEDICINE

## 2025-08-12 PROCEDURE — 3044F HG A1C LEVEL LT 7.0%: CPT | Performed by: INTERNAL MEDICINE

## 2025-08-12 PROCEDURE — 3074F SYST BP LT 130 MM HG: CPT | Performed by: INTERNAL MEDICINE

## 2025-08-12 PROCEDURE — 1160F RVW MEDS BY RX/DR IN RCRD: CPT | Performed by: INTERNAL MEDICINE

## 2025-08-12 PROCEDURE — 1159F MED LIST DOCD IN RCRD: CPT | Performed by: INTERNAL MEDICINE

## 2025-08-12 ASSESSMENT — ENCOUNTER SYMPTOMS
ARTHRALGIAS: 0
FREQUENCY: 0
APPETITE CHANGE: 0
VOMITING: 0
DIARRHEA: 0
CONSTIPATION: 0
SHORTNESS OF BREATH: 0
NERVOUS/ANXIOUS: 0
HEADACHES: 0
POLYDIPSIA: 0
ABDOMINAL PAIN: 0
FEVER: 0
COUGH: 0
SORE THROAT: 0
NAUSEA: 0

## 2025-08-18 DIAGNOSIS — F41.9 ANXIETY: ICD-10-CM

## 2025-08-18 RX ORDER — FLUOXETINE 20 MG/1
20 CAPSULE ORAL DAILY
Qty: 90 CAPSULE | Refills: 1 | Status: SHIPPED | OUTPATIENT
Start: 2025-08-18

## 2025-08-19 DIAGNOSIS — I10 HYPERTENSION, UNSPECIFIED TYPE: ICD-10-CM

## 2025-08-19 RX ORDER — CARVEDILOL 6.25 MG/1
6.25 TABLET ORAL 2 TIMES DAILY
Qty: 180 TABLET | Refills: 1 | Status: SHIPPED | OUTPATIENT
Start: 2025-08-19 | End: 2026-02-15

## 2025-08-27 ENCOUNTER — PATIENT MESSAGE (OUTPATIENT)
Facility: CLINIC | Age: 65
End: 2025-08-27
Payer: COMMERCIAL

## 2025-08-27 DIAGNOSIS — F41.9 ANXIETY: Primary | ICD-10-CM

## 2025-08-28 RX ORDER — FLUOXETINE HYDROCHLORIDE 40 MG/1
40 CAPSULE ORAL DAILY
Qty: 90 CAPSULE | Refills: 1 | Status: SHIPPED | OUTPATIENT
Start: 2025-08-28

## 2025-11-18 ENCOUNTER — APPOINTMENT (OUTPATIENT)
Facility: CLINIC | Age: 65
End: 2025-11-18
Payer: COMMERCIAL

## 2026-02-11 ENCOUNTER — APPOINTMENT (OUTPATIENT)
Dept: ENDOCRINOLOGY | Facility: CLINIC | Age: 66
End: 2026-02-11
Payer: COMMERCIAL

## (undated) DEVICE — Device

## (undated) DEVICE — PREP TRAY, SKIN, DRY, W/GLOVES

## (undated) DEVICE — PROBE, ARTHROSCOPIC ENERGY 90 DEG WITH SUCTION

## (undated) DEVICE — BUR, OVAL, STERLING, HIGH SPEED, 6 MM

## (undated) DEVICE — DRAPE, SHEET, U, STERI DRAPE, 47 X 51 IN, DISPOSABLE, STERILE

## (undated) DEVICE — PADDING, WEBRIL, UNDERCAST, STERILE, 3 IN

## (undated) DEVICE — REAMER, ACORN, 8MM

## (undated) DEVICE — MAT, FLOOR, SUCTION, LOW PROFILE, 50 X 34

## (undated) DEVICE — APPLICATOR, CHLORAPREP, W/ORANGE TINT, 26ML

## (undated) DEVICE — CUFF, TOURNIQUET, 18 X 4, SNGL PORT/SNGL BLADDER, DISP, LF

## (undated) DEVICE — NEEDLE, SPINAL, QUINCKE, 18 G X 3.5 IN, PINK HUB

## (undated) DEVICE — SOLUTION, IRRI GATION, LACTATED RINGERS, 3000 ML, BAG

## (undated) DEVICE — SUTURE SYSTEM, EXPRESSEW III NEEDLES

## (undated) DEVICE — TUBING, SUCTION, CONNECTING, NON-CONDUCTIVE, SURE GRIP CONNECTORS, 3/16 IN X 10 FT

## (undated) DEVICE — ADHESIVE, SKIN, DERMABOND ADVANCED, 15CM, PEN-STYLE

## (undated) DEVICE — STRIP, SKIN CLOSURE, STERI STRIP, REINFORCED, 0.5 X 4 IN

## (undated) DEVICE — DRESSING, GAUZE, PETROLATUM, PATCH, XEROFORM, 1 X 8 IN, STERILE

## (undated) DEVICE — SUTURE, MONOCRYL, 4-0, 18 IN, PS2, UNDYED

## (undated) DEVICE — MASK, FACE, TENET, FOAM POSITIONING, DISPOSABLE

## (undated) DEVICE — COVER, MINI DRAPE SET, C-ARM, FOR OEC/GE

## (undated) DEVICE — SYRINGE, 60 CC, LUER LOCK, MONOJECT, W/O CAP, LF

## (undated) DEVICE — CANNULA, CLEAR, THREADED, 7.0 X 75MM

## (undated) DEVICE — DRAPE, SHEET, U, W/ADHESIVE STRIP, IMPERVIOUS, 60 X 70 IN, DISPOSABLE, LF, STERILE

## (undated) DEVICE — KIT, BEACH CHAIR TRIMANO

## (undated) DEVICE — GOWN, SURGICAL, UROLOGY, IMPERVIOUS, XLONG, XLARGE, DISPOSABLE

## (undated) DEVICE — HOLSTER, BOVIE, ES PENCIL, DISP

## (undated) DEVICE — TUBING, ARTHROSCOPIC INFLOW, 10K

## (undated) DEVICE — SUTURE, V-LOC, 4-0, 18 IN, P-12, 90 ABS

## (undated) DEVICE — STOCKINETTE, TUBE, BLN, ST, 1 PLY, 4 X 48 IN, LF

## (undated) DEVICE — CANNULA, CLEAR, THREADED, 8.5 X 55MM

## (undated) DEVICE — BUR, GREAT WHITE, 4.2MM

## (undated) DEVICE — CLOSURE SYSTEM, DERMABOND, PRINEO, 22CM, STERILE

## (undated) DEVICE — BANDAGE, ELASTIC, MATRIX, SELF-CLOSURE, 3 IN X 5 YD, LF

## (undated) DEVICE — SUTURE, ETHILON, 3-0, 18 IN, PS1, BLACK